# Patient Record
Sex: FEMALE | Race: WHITE | NOT HISPANIC OR LATINO | Employment: OTHER | URBAN - NONMETROPOLITAN AREA
[De-identification: names, ages, dates, MRNs, and addresses within clinical notes are randomized per-mention and may not be internally consistent; named-entity substitution may affect disease eponyms.]

---

## 2017-05-03 RX ORDER — ESTRADIOL AND NORETHINDRONE ACETATE .5; .1 MG/1; MG/1
TABLET ORAL
Qty: 84 TABLET | Refills: 1 | Status: SHIPPED | OUTPATIENT
Start: 2017-05-03 | End: 2017-10-27 | Stop reason: SDUPTHER

## 2017-10-16 DIAGNOSIS — Z12.31 SCREENING MAMMOGRAM, ENCOUNTER FOR: Primary | ICD-10-CM

## 2017-10-23 ENCOUNTER — TRANSCRIBE ORDERS (OUTPATIENT)
Dept: ADMINISTRATIVE | Facility: HOSPITAL | Age: 52
End: 2017-10-23

## 2017-10-23 DIAGNOSIS — R10.9 ABDOMINAL PAIN, UNSPECIFIED ABDOMINAL LOCATION: Primary | ICD-10-CM

## 2017-10-26 ENCOUNTER — APPOINTMENT (OUTPATIENT)
Dept: CT IMAGING | Facility: HOSPITAL | Age: 52
End: 2017-10-26

## 2017-10-26 ENCOUNTER — TRANSCRIBE ORDERS (OUTPATIENT)
Dept: ADMINISTRATIVE | Facility: HOSPITAL | Age: 52
End: 2017-10-26

## 2017-10-26 ENCOUNTER — HOSPITAL ENCOUNTER (EMERGENCY)
Facility: HOSPITAL | Age: 52
Discharge: HOME | End: 2017-10-26
Attending: EMERGENCY MEDICINE | Admitting: EMERGENCY MEDICINE

## 2017-10-26 VITALS
DIASTOLIC BLOOD PRESSURE: 72 MMHG | WEIGHT: 170 LBS | BODY MASS INDEX: 25.76 KG/M2 | SYSTOLIC BLOOD PRESSURE: 127 MMHG | TEMPERATURE: 97.1 F | HEART RATE: 72 BPM | OXYGEN SATURATION: 100 % | RESPIRATION RATE: 15 BRPM | HEIGHT: 68 IN

## 2017-10-26 DIAGNOSIS — R10.2 PELVIC PAIN: Primary | ICD-10-CM

## 2017-10-26 LAB
ALBUMIN SERPL-MCNC: 4.7 G/DL (ref 3.5–5)
ALBUMIN/GLOB SERPL: 1.5 G/DL (ref 1.1–2.5)
ALP SERPL-CCNC: 69 U/L (ref 24–120)
ALT SERPL W P-5'-P-CCNC: 26 U/L (ref 0–54)
AMYLASE SERPL-CCNC: 52 U/L (ref 30–110)
ANION GAP SERPL CALCULATED.3IONS-SCNC: 11 MMOL/L (ref 4–13)
AST SERPL-CCNC: 28 U/L (ref 7–45)
BASOPHILS # BLD AUTO: 0.02 10*3/MM3 (ref 0–0.2)
BASOPHILS NFR BLD AUTO: 0.2 % (ref 0–2)
BILIRUB SERPL-MCNC: 0.5 MG/DL (ref 0.1–1)
BILIRUB UR QL STRIP: NEGATIVE
BUN BLD-MCNC: 13 MG/DL (ref 5–21)
BUN/CREAT SERPL: 18.8 (ref 7–25)
CALCIUM SPEC-SCNC: 9.7 MG/DL (ref 8.4–10.4)
CHLORIDE SERPL-SCNC: 104 MMOL/L (ref 98–110)
CLARITY UR: CLEAR
CO2 SERPL-SCNC: 27 MMOL/L (ref 24–31)
COLOR UR: YELLOW
CREAT BLD-MCNC: 0.69 MG/DL (ref 0.5–1.4)
D-LACTATE SERPL-SCNC: 0.9 MMOL/L (ref 0.5–2)
DEPRECATED RDW RBC AUTO: 40.4 FL (ref 40–54)
EOSINOPHIL # BLD AUTO: 0.01 10*3/MM3 (ref 0–0.7)
EOSINOPHIL NFR BLD AUTO: 0.1 % (ref 0–4)
ERYTHROCYTE [DISTWIDTH] IN BLOOD BY AUTOMATED COUNT: 11.9 % (ref 12–15)
GFR SERPL CREATININE-BSD FRML MDRD: 90 ML/MIN/1.73
GLOBULIN UR ELPH-MCNC: 3.2 GM/DL
GLUCOSE BLD-MCNC: 98 MG/DL (ref 70–100)
GLUCOSE UR STRIP-MCNC: NEGATIVE MG/DL
HCT VFR BLD AUTO: 41.4 % (ref 37–47)
HGB BLD-MCNC: 14.1 G/DL (ref 12–16)
HGB UR QL STRIP.AUTO: NEGATIVE
IMM GRANULOCYTES # BLD: 0.03 10*3/MM3 (ref 0–0.03)
IMM GRANULOCYTES NFR BLD: 0.3 % (ref 0–5)
KETONES UR QL STRIP: NEGATIVE
LEUKOCYTE ESTERASE UR QL STRIP.AUTO: NEGATIVE
LIPASE SERPL-CCNC: 65 U/L (ref 23–203)
LYMPHOCYTES # BLD AUTO: 1.24 10*3/MM3 (ref 0.72–4.86)
LYMPHOCYTES NFR BLD AUTO: 12.4 % (ref 15–45)
MCH RBC QN AUTO: 31.9 PG (ref 28–32)
MCHC RBC AUTO-ENTMCNC: 34.1 G/DL (ref 33–36)
MCV RBC AUTO: 93.7 FL (ref 82–98)
MONOCYTES # BLD AUTO: 0.46 10*3/MM3 (ref 0.19–1.3)
MONOCYTES NFR BLD AUTO: 4.6 % (ref 4–12)
NEUTROPHILS # BLD AUTO: 8.28 10*3/MM3 (ref 1.87–8.4)
NEUTROPHILS NFR BLD AUTO: 82.4 % (ref 39–78)
NITRITE UR QL STRIP: NEGATIVE
PH UR STRIP.AUTO: 7.5 [PH] (ref 5–8)
PLATELET # BLD AUTO: 256 10*3/MM3 (ref 130–400)
PMV BLD AUTO: 10.8 FL (ref 6–12)
POTASSIUM BLD-SCNC: 4.4 MMOL/L (ref 3.5–5.3)
PROT SERPL-MCNC: 7.9 G/DL (ref 6.3–8.7)
PROT UR QL STRIP: NEGATIVE
RBC # BLD AUTO: 4.42 10*6/MM3 (ref 4.2–5.4)
SODIUM BLD-SCNC: 142 MMOL/L (ref 135–145)
SP GR UR STRIP: >=1.03 (ref 1–1.03)
UROBILINOGEN UR QL STRIP: NORMAL
WBC NRBC COR # BLD: 10.04 10*3/MM3 (ref 4.8–10.8)

## 2017-10-26 PROCEDURE — 99283 EMERGENCY DEPT VISIT LOW MDM: CPT

## 2017-10-26 PROCEDURE — 81003 URINALYSIS AUTO W/O SCOPE: CPT | Performed by: EMERGENCY MEDICINE

## 2017-10-26 PROCEDURE — 83605 ASSAY OF LACTIC ACID: CPT | Performed by: EMERGENCY MEDICINE

## 2017-10-26 PROCEDURE — 85025 COMPLETE CBC W/AUTO DIFF WBC: CPT | Performed by: EMERGENCY MEDICINE

## 2017-10-26 PROCEDURE — 0 IOPAMIDOL PER 1 ML: Performed by: EMERGENCY MEDICINE

## 2017-10-26 PROCEDURE — 82150 ASSAY OF AMYLASE: CPT | Performed by: EMERGENCY MEDICINE

## 2017-10-26 PROCEDURE — 83690 ASSAY OF LIPASE: CPT | Performed by: EMERGENCY MEDICINE

## 2017-10-26 PROCEDURE — 96361 HYDRATE IV INFUSION ADD-ON: CPT

## 2017-10-26 PROCEDURE — 96360 HYDRATION IV INFUSION INIT: CPT

## 2017-10-26 PROCEDURE — 74177 CT ABD & PELVIS W/CONTRAST: CPT

## 2017-10-26 PROCEDURE — 80053 COMPREHEN METABOLIC PANEL: CPT | Performed by: EMERGENCY MEDICINE

## 2017-10-26 RX ORDER — SODIUM CHLORIDE 9 MG/ML
125 INJECTION, SOLUTION INTRAVENOUS CONTINUOUS
Status: DISCONTINUED | OUTPATIENT
Start: 2017-10-26 | End: 2017-10-26 | Stop reason: HOSPADM

## 2017-10-26 RX ORDER — HYDROCODONE BITARTRATE AND ACETAMINOPHEN 7.5; 325 MG/1; MG/1
1 TABLET ORAL EVERY 4 HOURS PRN
Qty: 20 TABLET | Refills: 0 | Status: SHIPPED | OUTPATIENT
Start: 2017-10-26 | End: 2017-12-14

## 2017-10-26 RX ORDER — SODIUM CHLORIDE 0.9 % (FLUSH) 0.9 %
10 SYRINGE (ML) INJECTION AS NEEDED
Status: DISCONTINUED | OUTPATIENT
Start: 2017-10-26 | End: 2017-10-26 | Stop reason: HOSPADM

## 2017-10-26 RX ADMIN — IOPAMIDOL 100 ML: 755 INJECTION, SOLUTION INTRAVENOUS at 12:15

## 2017-10-26 RX ADMIN — SODIUM CHLORIDE 125 ML/HR: 9 INJECTION, SOLUTION INTRAVENOUS at 11:42

## 2017-10-26 NOTE — ED PROVIDER NOTES
Subjective   HPI Comments: Patient complains of abdominal pain this been going on for 3-3-1/2 weeks.  It is mostly lower in her abdomen.  She says the caliber of her stool has changed and is much smaller now when she tries to have a bowel movement.  It is also little less formed.  She has seen her family physician and had an ultrasound on the upper abdomen which showed a polyp in her gallbladder but everything else was okay.  She had a previous appendectomy.  She went through menopause many years ago and does not have menstrual cycles now.  She does not have any dysuria or frequency of urination.  The pain is increased where she does not she can tolerate it anymore.  Her family physician apparently was trying to arrange a CT scan but that would be some time at least a week away and she felt like the pain was bad enough she could not wait now.  She has had some anorexia but no actual vomiting.  Should not had any blood in her stool.    Patient is a 52 y.o. female presenting with abdominal pain.   History provided by:  Patient and spouse   used: No    Abdominal Pain   Pain location:  LLQ and RLQ  Pain quality: aching    Pain radiates to:  Does not radiate  Pain severity:  Moderate  Onset quality:  Gradual  Timing:  Constant  Progression:  Worsening  Chronicity:  New  Context: not alcohol use, not awakening from sleep, not diet changes, not eating, not laxative use, not medication withdrawal, not previous surgeries, not recent illness, not recent sexual activity, not recent travel, not retching, not sick contacts, not suspicious food intake and not trauma    Relieved by:  Nothing  Worsened by:  Nothing  Ineffective treatments:  None tried  Associated symptoms: anorexia    Associated symptoms: no belching, no chest pain, no chills, no constipation, no cough, no diarrhea, no dysuria, no fatigue, no fever, no flatus, no hematemesis, no hematochezia, no hematuria, no melena, no nausea, no shortness of  breath, no sore throat, no vaginal bleeding, no vaginal discharge and no vomiting    Risk factors: no alcohol abuse, no aspirin use, not elderly, has not had multiple surgeries, no NSAID use, not obese, not pregnant and no recent hospitalization        Review of Systems   Constitutional: Negative.  Negative for chills, fatigue and fever.   HENT: Negative.  Negative for sore throat.    Respiratory: Negative.  Negative for cough and shortness of breath.    Cardiovascular: Negative.  Negative for chest pain.   Gastrointestinal: Positive for abdominal pain and anorexia. Negative for constipation, diarrhea, flatus, hematemesis, hematochezia, melena, nausea and vomiting.   Genitourinary: Negative.  Negative for dysuria, hematuria, vaginal bleeding and vaginal discharge.   Musculoskeletal: Negative.    Neurological: Negative.    Hematological: Negative.    Psychiatric/Behavioral: Negative.    All other systems reviewed and are negative.      No past medical history on file.    No Known Allergies    No past surgical history on file.    No family history on file.    Social History     Social History   • Marital status:      Spouse name: N/A   • Number of children: N/A   • Years of education: N/A     Social History Main Topics   • Smoking status: Not on file   • Smokeless tobacco: Not on file   • Alcohol use Not on file   • Drug use: Not on file   • Sexual activity: Not on file     Other Topics Concern   • Not on file     Social History Narrative       Prior to Admission medications    Medication Sig Start Date End Date Taking? Authorizing Provider   Estradiol-Norethindrone Acet 0.5-0.1 MG per tablet TAKE 1 TABLET DAILY 5/3/17   [facility]   PREMARIN 0.625 MG/GM vaginal cream USE 1/2 GRAM 2 TIMES A WEEK 7/5/16   Historical Provider, MD       Medications   sodium chloride 0.9 % flush 10 mL (not administered)   sodium chloride 0.9 % infusion (0 mL/hr Intravenous Stopped 10/26/17 9150)   iopamidol  (ISOVUE-370) 76 % injection 100 mL (100 mL Intravenous Given 10/26/17 1215)       Vitals:    10/26/17 1459   BP: 127/72   Pulse: 72   Resp: 15   Temp: 97.1 °F (36.2 °C)   SpO2: 100%         Objective   Physical Exam   Constitutional: She is oriented to person, place, and time. She appears well-developed and well-nourished.   HENT:   Head: Normocephalic and atraumatic.   Eyes: EOM are normal. Pupils are equal, round, and reactive to light.   Neck: Normal range of motion. Neck supple.   Cardiovascular: Normal rate and regular rhythm.    Pulmonary/Chest: Effort normal and breath sounds normal.   Abdominal: Soft. Bowel sounds are normal. There is tenderness.   Patient is tender across the lower abdomen but no rebound or percussion tenderness is noted.  The pain may be a little more so in the midline.   Musculoskeletal: Normal range of motion.   Neurological: She is alert and oriented to person, place, and time.   Skin: Skin is warm and dry.   Psychiatric: She has a normal mood and affect. Her behavior is normal.   Nursing note and vitals reviewed.      Procedures         Lab Results (last 24 hours)     Procedure Component Value Units Date/Time    CBC & Differential [[id number]] Collected:  10/26/17 1144    Specimen:  Blood Updated:  10/26/17 1158    Narrative:       The following orders were created for panel order CBC & Differential.  Procedure                               Abnormality         Status                     ---------                               -----------         ------                     CBC Auto Differential[[id number]]         Abnormal            Final result                 Please view results for these tests on the individual orders.    Comprehensive Metabolic Panel [[id number]] Collected:  10/26/17 1144    Specimen:  Blood Updated:  10/26/17 1208     Glucose 98 mg/dL      BUN 13 mg/dL      Creatinine 0.69 mg/dL      Sodium 142 mmol/L      Potassium 4.4 mmol/L      Chloride 104 mmol/L      CO2 27.0  mmol/L      Calcium 9.7 mg/dL      Total Protein 7.9 g/dL      Albumin 4.70 g/dL      ALT (SGPT) 26 U/L      AST (SGOT) 28 U/L      Alkaline Phosphatase 69 U/L      Total Bilirubin 0.5 mg/dL      eGFR Non African Amer 90 mL/min/1.73      Globulin 3.2 gm/dL      A/G Ratio 1.5 g/dL      BUN/Creatinine Ratio 18.8     Anion Gap 11.0 mmol/L     Lipase [[id number]]  (Normal) Collected:  10/26/17 1144    Specimen:  Blood Updated:  10/26/17 1208     Lipase 65 U/L     Amylase [[id number]]  (Normal) Collected:  10/26/17 1144    Specimen:  Blood Updated:  10/26/17 1208     Amylase 52 U/L     Lactic Acid, Plasma [[id number]]  (Normal) Collected:  10/26/17 1144    Specimen:  Blood Updated:  10/26/17 1208     Lactate 0.9 mmol/L     CBC Auto Differential [[id number]]  (Abnormal) Collected:  10/26/17 1144    Specimen:  Blood Updated:  10/26/17 1158     WBC 10.04 10*3/mm3      RBC 4.42 10*6/mm3      Hemoglobin 14.1 g/dL      Hematocrit 41.4 %      MCV 93.7 fL      MCH 31.9 pg      MCHC 34.1 g/dL      RDW 11.9 (L) %      RDW-SD 40.4 fl      MPV 10.8 fL      Platelets 256 10*3/mm3      Neutrophil % 82.4 (H) %      Lymphocyte % 12.4 (L) %      Monocyte % 4.6 %      Eosinophil % 0.1 %      Basophil % 0.2 %      Immature Grans % 0.3 %      Neutrophils, Absolute 8.28 10*3/mm3      Lymphocytes, Absolute 1.24 10*3/mm3      Monocytes, Absolute 0.46 10*3/mm3      Eosinophils, Absolute 0.01 10*3/mm3      Basophils, Absolute 0.02 10*3/mm3      Immature Grans, Absolute 0.03 10*3/mm3     Urinalysis With / Culture If Indicated - Urine, Clean Catch [[id number]]  (Normal) Collected:  10/26/17 1240    Specimen:  Urine from Urine, Clean Catch Updated:  10/26/17 1318     Color, UA Yellow     Appearance, UA Clear     pH, UA 7.5     Specific Gravity, UA >=1.030     Glucose, UA Negative     Ketones, UA Negative     Bilirubin, UA Negative     Blood, UA Negative     Protein, UA Negative     Leuk Esterase, UA Negative     Nitrite, UA Negative     Urobilinogen, UA 0.2  E.U./dL    Narrative:       Urine microscopic not indicated.          CT Abdomen Pelvis With Contrast   Final Result   1. Cholelithiasis.           2. Horseshoe kidney   3. Diverticulosis..        4 uterine fibroid           This report was finalized on 10/26/2017 13:11 by Dr. Ferdinand Bolanos MD.          ED Course  ED Course   Comment By Time   I told the patient that her CT scan did reveal uterine fibroids is the only reasonable explanation for her pain since it is midline in her pelvis.  Though I cannot be sure that this is the cause of the pain.  Diverticulosis should not hurt and cholelithiasis hurting right upper quadrant.  Right now been treated for that and she says she always an appointment with Dr. August her OB/GYN next week and I told her to keep that. Dante Lee Jr., MD 10/26 1544          MDM  Number of Diagnoses or Management Options  Pelvic pain: new and requires workup     Amount and/or Complexity of Data Reviewed  Clinical lab tests: ordered and reviewed  Tests in the radiology section of CPT®: ordered and reviewed    Risk of Complications, Morbidity, and/or Mortality  Presenting problems: moderate  Diagnostic procedures: moderate  Management options: moderate    Patient Progress  Patient progress: stable      Final diagnoses:   Pelvic pain          Dante Lee Jr., MD  10/26/17 154

## 2017-10-27 ENCOUNTER — OFFICE VISIT (OUTPATIENT)
Dept: OBSTETRICS AND GYNECOLOGY | Facility: CLINIC | Age: 52
End: 2017-10-27

## 2017-10-27 VITALS
WEIGHT: 172 LBS | SYSTOLIC BLOOD PRESSURE: 130 MMHG | HEIGHT: 68 IN | BODY MASS INDEX: 26.07 KG/M2 | DIASTOLIC BLOOD PRESSURE: 72 MMHG

## 2017-10-27 DIAGNOSIS — K57.92 DIVERTICULITIS OF INTESTINE WITHOUT PERFORATION OR ABSCESS WITHOUT BLEEDING, UNSPECIFIED PART OF INTESTINAL TRACT: ICD-10-CM

## 2017-10-27 DIAGNOSIS — Z00.00 ANNUAL PHYSICAL EXAM: Primary | ICD-10-CM

## 2017-10-27 DIAGNOSIS — Z78.0 MENOPAUSE: ICD-10-CM

## 2017-10-27 DIAGNOSIS — Z78.9 NONSMOKER: ICD-10-CM

## 2017-10-27 PROCEDURE — 87624 HPV HI-RISK TYP POOLED RSLT: CPT | Performed by: OBSTETRICS & GYNECOLOGY

## 2017-10-27 PROCEDURE — 99396 PREV VISIT EST AGE 40-64: CPT | Performed by: OBSTETRICS & GYNECOLOGY

## 2017-10-27 PROCEDURE — 99213 OFFICE O/P EST LOW 20 MIN: CPT | Performed by: OBSTETRICS & GYNECOLOGY

## 2017-10-27 PROCEDURE — G0123 SCREEN CERV/VAG THIN LAYER: HCPCS | Performed by: OBSTETRICS & GYNECOLOGY

## 2017-10-27 RX ORDER — DICLOFENAC SODIUM 75 MG/1
TABLET, DELAYED RELEASE ORAL
Refills: 0 | COMMUNITY
Start: 2017-08-07 | End: 2017-10-27

## 2017-10-27 RX ORDER — ESTRADIOL AND NORETHINDRONE ACETATE .5; .1 MG/1; MG/1
1 TABLET ORAL DAILY
Qty: 90 TABLET | Refills: 3 | Status: SHIPPED | OUTPATIENT
Start: 2017-10-27 | End: 2018-11-09 | Stop reason: SDUPTHER

## 2017-10-27 RX ORDER — METRONIDAZOLE 500 MG/1
500 TABLET ORAL 3 TIMES DAILY
Qty: 30 TABLET | Refills: 0 | Status: SHIPPED | OUTPATIENT
Start: 2017-10-27 | End: 2017-11-06

## 2017-10-27 RX ORDER — METRONIDAZOLE 500 MG/1
500 TABLET ORAL 3 TIMES DAILY
Qty: 30 TABLET | Refills: 0 | Status: SHIPPED | OUTPATIENT
Start: 2017-10-27 | End: 2017-10-27 | Stop reason: SDUPTHER

## 2017-10-27 RX ORDER — CIPROFLOXACIN 250 MG/1
250 TABLET, FILM COATED ORAL 2 TIMES DAILY
Qty: 20 TABLET | Refills: 0 | Status: SHIPPED | OUTPATIENT
Start: 2017-10-27 | End: 2017-11-06

## 2017-10-27 RX ORDER — CIPROFLOXACIN 250 MG/1
250 TABLET, FILM COATED ORAL 2 TIMES DAILY
Qty: 20 TABLET | Refills: 0 | Status: SHIPPED | OUTPATIENT
Start: 2017-10-27 | End: 2017-10-27 | Stop reason: SDUPTHER

## 2017-10-27 NOTE — PROGRESS NOTES
Subjective   Chief Complaint   Patient presents with   • Gynecologic Exam     pt here today for annual exam. pt says that she has been having some lower pelvic pain. pt says that she is very sensitive to touch abdominally. pt voices no other concerns.      Patricia Palma is a 52 y.o. year old  menopausal female presenting to be seen for her annual exam.  The patient denies any vaginal bleeding in the past 12 months. Hot flashes and night sweats are not a significant problem.  Patient reports abdominal pain for exactly last four weeks, that she recalls started abruptly on a Saturday night.  She has been taking tylenol, and that takes the edge off, but she reports pain to be consistent every day.  Pain is often worse in the morning, but can also be bad at night.  Some night pain keeps her from sleeping.  No constipation or diarrhea.  + loss of appetite, but no nausea or vomiting.  She does report feeling bloated after meals.    SEXUAL Hx:  She is sexually active.  In the past year there has not been new sexual partners.      HEALTH Hx:  She exercises regularly: no. Currently she cannot tolerate any jarring or exercise because it exacerbates abd pain.    The patient reports regular self breast exams: yes  She has noticed changes in height: no.    No Additional Complaints Reported    The following portions of the patient's history were reviewed and updated as appropriate:problem list, current medications, allergies, past family history, past medical history, past social history and past surgical history.    Smoking status: Not on file                        Smokeless status: Not on file                       Review of Systems   Constitutional: Positive for activity change and appetite change.   Eyes: Positive for visual disturbance (wears glasses).   Respiratory: Negative for shortness of breath.    Cardiovascular: Negative for chest pain.   Gastrointestinal: Positive for abdominal distention (sometimes) and  "abdominal pain (across entire lower abdomen). Negative for constipation, diarrhea, nausea and vomiting.   Genitourinary: Positive for difficulty urinating (but reports that it has always been that way, has a very \"shy\" bladder) and dyspareunia. Negative for dysuria, frequency, urgency, vaginal bleeding and vaginal discharge.   Musculoskeletal: Negative for arthralgias and back pain.   Skin: Negative for rash.   Neurological: Negative for dizziness and headaches.         Objective   /72  Ht 68\" (172.7 cm)  Wt 172 lb (78 kg)  BMI 26.15 kg/m2  Physical Exam   Constitutional: She is oriented to person, place, and time. She appears well-developed and well-nourished. No distress.   HENT:   Head: Normocephalic and atraumatic.   Eyes: EOM are normal.   Neck: Normal range of motion. Neck supple. No thyromegaly present.   Cardiovascular: Normal rate and regular rhythm.    No murmur heard.  Pulmonary/Chest: Effort normal and breath sounds normal. Right breast exhibits no inverted nipple and no mass. Left breast exhibits no inverted nipple and no mass.   Abdominal: Soft. She exhibits no distension. There is tenderness (generalized across entire lower abdomen). There is no guarding (mild).   Genitourinary: Vagina normal and uterus normal. No breast tenderness or discharge. Pelvic exam was performed with patient supine. There is no tenderness or lesion on the right labia. There is no tenderness or lesion on the left labia. Cervix exhibits no motion tenderness, no discharge and no friability. Right adnexum displays no tenderness and no fullness. Left adnexum displays no tenderness and no fullness. No bleeding in the vagina. No vaginal discharge found.   Genitourinary Comments: Pt again with cervical stenosis that will not allow introduction of PAP brush into the endocervical canal (unchanged from last year).  Cx mildly tender to palpation, but less impressive than the generalized abdominal tenderness   Musculoskeletal: " Normal range of motion. She exhibits no edema.   Neurological: She is alert and oriented to person, place, and time.   Skin: Skin is warm and dry.   Psychiatric: She has a normal mood and affect. Her behavior is normal. Judgment normal.   Nursing note and vitals reviewed.       Assessment & Plan    Patricia was seen today for gynecologic exam.    Diagnoses and all orders for this visit:    Annual physical exam: Exam remarkable for moderate abdominal tenderness across the entire lower abdomen.  Pelvic exam unremarkable, except for cervical stenosis noted on previous exams.  The patient is spectral to have diverticulitis as the source of her lower abdominal pain for the past month, and is being treated appropriately.  A Pap smear was collected, the patient declined addition of STD testing.  Screening labs are being done by the patient's primary care physician.  Her mammogram is rescheduled for next week.  RTO in 4 weeks to follow-up on abd pain.  -     Liquid-based Pap Smear, Screening - ThinPrep Vial, Cervix    Diverticulitis of intestine without perforation or abscess without bleeding, unspecified part of intestinal tract  -     ciprofloxacin (CIPRO) 250 MG tablet; Take 1 tablet by mouth 2 (Two) Times a Day for 10 days.  -     metroNIDAZOLE (FLAGYL) 500 MG tablet; Take 1 tablet by mouth 3 (Three) Times a Day for 10 days.    Nonsmoker    BMI 26.0-26.9,adult    Menopause  -     Estradiol-Norethindrone Acet 0.5-0.1 MG per tablet; Take 1 tablet by mouth Daily.      This note was electronically signed.    Kaley August MD  10/27/2017  1:38 PM

## 2017-11-02 ENCOUNTER — APPOINTMENT (OUTPATIENT)
Dept: MAMMOGRAPHY | Facility: HOSPITAL | Age: 52
End: 2017-11-02
Attending: OBSTETRICS & GYNECOLOGY

## 2017-11-02 ENCOUNTER — APPOINTMENT (OUTPATIENT)
Dept: CT IMAGING | Facility: HOSPITAL | Age: 52
End: 2017-11-02

## 2017-11-03 LAB
GEN CATEG CVX/VAG CYTO-IMP: NORMAL
LAB AP CASE REPORT: NORMAL
LAB AP GYN ADDITIONAL INFORMATION: NORMAL
LAB AP GYN OTHER FINDINGS: NORMAL
Lab: NORMAL
PATH INTERP SPEC-IMP: NORMAL
STAT OF ADQ CVX/VAG CYTO-IMP: NORMAL

## 2017-11-28 ENCOUNTER — OFFICE VISIT (OUTPATIENT)
Dept: OBSTETRICS AND GYNECOLOGY | Facility: CLINIC | Age: 52
End: 2017-11-28

## 2017-11-28 ENCOUNTER — PROCEDURE VISIT (OUTPATIENT)
Dept: OBSTETRICS AND GYNECOLOGY | Facility: CLINIC | Age: 52
End: 2017-11-28

## 2017-11-28 VITALS
DIASTOLIC BLOOD PRESSURE: 82 MMHG | SYSTOLIC BLOOD PRESSURE: 114 MMHG | HEIGHT: 68 IN | BODY MASS INDEX: 25.61 KG/M2 | WEIGHT: 169 LBS

## 2017-11-28 DIAGNOSIS — R10.2 PELVIC PAIN: Primary | ICD-10-CM

## 2017-11-28 DIAGNOSIS — D25.9 UTERINE LEIOMYOMA, UNSPECIFIED LOCATION: ICD-10-CM

## 2017-11-28 DIAGNOSIS — R10.30 LOWER ABDOMINAL PAIN: ICD-10-CM

## 2017-11-28 DIAGNOSIS — K57.92 DIVERTICULITIS OF INTESTINE WITHOUT PERFORATION OR ABSCESS WITHOUT BLEEDING, UNSPECIFIED PART OF INTESTINAL TRACT: Primary | ICD-10-CM

## 2017-11-28 DIAGNOSIS — Z78.9 NONSMOKER: ICD-10-CM

## 2017-11-28 DIAGNOSIS — Z78.0 MENOPAUSE: ICD-10-CM

## 2017-11-28 PROCEDURE — 99213 OFFICE O/P EST LOW 20 MIN: CPT | Performed by: OBSTETRICS & GYNECOLOGY

## 2017-11-28 PROCEDURE — 76830 TRANSVAGINAL US NON-OB: CPT | Performed by: OBSTETRICS & GYNECOLOGY

## 2017-11-28 RX ORDER — DICYCLOMINE HYDROCHLORIDE 10 MG/1
10 CAPSULE ORAL AS NEEDED
COMMUNITY
Start: 2017-10-16 | End: 2019-07-03 | Stop reason: HOSPADM

## 2017-11-28 RX ORDER — DICLOFENAC SODIUM 75 MG/1
TABLET, DELAYED RELEASE ORAL
COMMUNITY
Start: 2017-11-22 | End: 2017-12-14

## 2017-11-28 NOTE — PROGRESS NOTES
Dorys Palma  : 1965  MRN: [id number]  [facility]: [id number]    History and Physical    Subjective   Patricia Palma is a 52 y.o. year old  who presents for consultation about abd pain.  The patient was seen about a month ago and treated with abx for suspected diverticulitis.  She reports that for the first couple of weeks she was really getting better and convinced that the diagnosis of diverticulitis had been correct.  Pain initially started about 9 weeks ago, and began acutely - with an associated change in the caliber of her stools (smaller).    Pain is low, all the way across her lower abdomen, and is sometimes in her inguinal fold - almost shooting toward her inner thigh.  She recalls that it took about a week for abx to really help and her bowel movements to become regular again.  After she finished the abx, pain seemed to remain better for a couple more weeks, but then returned 5-6 days ago.  She recalls that the pain returned shortly after taking several doses of diclofenac (for shoulder pain), but is unsure whether that is a coincidence or a causative event.    Pain comes in waves, and will actually cause patient to double over and stop all of her regular activities.  Exacerbations of pain seem to be overall worse in the morning, but can be at any time.  The pain is severe enough to wake her up from a deep sleep at times.  She does not feel there is any temporal association to eating.    History reviewed. No pertinent past medical history.  Past Surgical History:   Procedure Laterality Date   • APPENDECTOMY     •  SECTION      x2   • COLONOSCOPY       Smoking status: Never Smoker                                                              Smokeless status: Never Used                          Current Outpatient Prescriptions:   •  diclofenac (VOLTAREN) 75 MG EC tablet, , Disp: , Rfl:   •  dicyclomine (BENTYL) 10 MG capsule, , Disp: , Rfl:   •  Estradiol-Norethindrone Acet  "0.5-0.1 MG per tablet, Take 1 tablet by mouth Daily., Disp: 90 tablet, Rfl: 3  •  HYDROcodone-acetaminophen (NORCO) 7.5-325 MG per tablet, Take 1 tablet by mouth Every 4 (Four) Hours As Needed for Moderate Pain ., Disp: 20 tablet, Rfl: 0    No Known Allergies    Family History   Problem Relation Age of Onset   • No Known Problems Mother    • No Known Problems Sister    • No Known Problems Brother      Review of Systems   Respiratory: Negative for shortness of breath.    Cardiovascular: Negative for chest pain.   Gastrointestinal: Positive for abdominal pain. Negative for blood in stool, constipation and diarrhea.        Patient did note narrower caliber of stool prior to onset of pain, but thinks that her stool normalized some after antibiotics   Genitourinary: Negative for difficulty urinating and dysuria.         Objective   /82 (BP Location: Left arm, Patient Position: Sitting)  Ht 68\" (172.7 cm)  Wt 169 lb (76.7 kg)  BMI 25.7 kg/m2    Physical Exam   Physical Exam   Constitutional: She is oriented to person, place, and time. She appears well-developed and well-nourished. No distress.   HENT:   Head: Normocephalic and atraumatic.   Eyes: EOM are normal.   Neck: Normal range of motion. No thyromegaly present.   Cardiovascular: Normal rate and regular rhythm.    No murmur heard.  Pulmonary/Chest: Effort normal and breath sounds normal. She has no wheezes.   Abdominal: Soft. She exhibits no distension. There is tenderness (generalized tenderness in lower abdomen).   Genitourinary:   Genitourinary Comments: U/S today: uterus 7.84 x 4.71 x 3.8, endo lining 4.39.  Two small fibroids, largest 21 mm.  Ovaries unremarkable.   Musculoskeletal: Normal range of motion.   Neurological: She is alert and oriented to person, place, and time.   Skin: Skin is warm and dry.   Psychiatric: She has a normal mood and affect. Her behavior is normal. Judgment normal.   Nursing note and vitals reviewed.      Labs  Lab Results "   Component Value Date     10/26/2017    HGB 14.1 10/26/2017    HCT 41.4 10/26/2017    WBC 10.04 10/26/2017     10/26/2017    K 4.4 10/26/2017     10/26/2017    CO2 27.0 10/26/2017    BUN 13 10/26/2017    CREATININE 0.69 10/26/2017    GLUCOSE 98 10/26/2017    ALBUMIN 4.70 10/26/2017    CALCIUM 9.7 10/26/2017    AST 28 10/26/2017    ALT 26 10/26/2017    BILITOT 0.5 10/26/2017        Assessment & Plan    Patricia was seen today for follow-up.    Diagnoses and all orders for this visit:    Diverticulitis of intestine without perforation or abscess without bleeding, unspecified part of intestinal tract:  Patient feels like pain was significantly improved for a couple of weeks after abx, but returned last week.  She feels exacerbation of the pain with any bowel peristalsis.  -     Ambulatory Referral to Gastroenterology    Nonsmoker    BMI 26.0-26.9,adult    Menopause    Uterine leiomyoma, unspecified location: largest of two fibroids is 21 mm.  Patient advised that these are not contributing to pain and are not growing at her age.  Remainder of pelvic u/s today is normal    Lower abdominal pain  -     Ambulatory Referral to Gastroenterology        Kaley August MD  11/28/2017  1:30 PM

## 2017-11-29 ENCOUNTER — TRANSCRIBE ORDERS (OUTPATIENT)
Dept: PHYSICAL THERAPY | Facility: HOSPITAL | Age: 52
End: 2017-11-29

## 2017-11-29 DIAGNOSIS — M25.512 LEFT SHOULDER PAIN, UNSPECIFIED CHRONICITY: Primary | ICD-10-CM

## 2017-12-11 ENCOUNTER — HOSPITAL ENCOUNTER (OUTPATIENT)
Dept: PHYSICAL THERAPY | Facility: HOSPITAL | Age: 52
Setting detail: THERAPIES SERIES
Discharge: HOME | End: 2017-12-11

## 2017-12-11 DIAGNOSIS — M25.512 LEFT SHOULDER PAIN, UNSPECIFIED CHRONICITY: Primary | ICD-10-CM

## 2017-12-11 PROCEDURE — 97110 THERAPEUTIC EXERCISES: CPT | Performed by: PHYSICAL THERAPIST

## 2017-12-11 PROCEDURE — 97161 PT EVAL LOW COMPLEX 20 MIN: CPT | Performed by: PHYSICAL THERAPIST

## 2017-12-12 NOTE — THERAPY EVALUATION
Outpatient Physical Therapy Ortho Initial Evaluation  [facility]     Patient Name: Patricia Palma  : 1965  MRN: [id number]  Today's Date: 2017      Visit Date: 2017     Subjective Improvement:  N/A     Attendance:   Approved:  Requires authorization         MD follow up:  TBD         RC date:  18         There is no problem list on file for this patient.       History reviewed. No pertinent past medical history.     Past Surgical History:   Procedure Laterality Date   • APPENDECTOMY     •  SECTION      x2   • COLONOSCOPY       No Known Allergies      Current Outpatient Prescriptions:   •  diclofenac (VOLTAREN) 75 MG EC tablet, , Disp: , Rfl:   •  dicyclomine (BENTYL) 10 MG capsule, , Disp: , Rfl:   •  Estradiol-Norethindrone Acet 0.5-0.1 MG per tablet, Take 1 tablet by mouth Daily., Disp: 90 tablet, Rfl: 3  •  HYDROcodone-acetaminophen (NORCO) 7.5-325 MG per tablet, Take 1 tablet by mouth Every 4 (Four) Hours As Needed for Moderate Pain ., Disp: 20 tablet, Rfl: 0      Visit Dx:     ICD-10-CM ICD-9-CM   1. Left shoulder pain, unspecified chronicity M25.512 719.41             Patient History       17 1400          History    Chief Complaint Pain  -KG      Type of Pain Shoulder pain  -KG      Date Current Problem(s) Began --   ~ 3 months ago  -KG      Brief Description of Current Complaint Pt presents with c/o of L shoulder pain that has worsened over the past month. Began ~3 months ago, doesn't remember a specific injury. Pain mostly located at anteriolateral shoulder. No numbness or tingling reported in shoulder but will wake up during the night with numbness in last 2 fingers but doesn't happen any other time.  -KG      Onset Date- PT 17  -KG      Patient/Caregiver Goals Relieve pain;Improve strength;Improve mobility;Return to prior level of function  -KG      Hand Dominance right-handed   Reaches/lifts with L arm vs. R arm  -KG       Occupation/sports/leisure activities Pt is self-employed; farm work/horses  -KG      Results of Clinical Tests No recent clinical tests  -KG      Pain     Pain Location Shoulder  -KG      Pain at Present 2  -KG      Pain at Best 2  -KG      Pain at Worst 8  -KG      Pain Frequency Constant/continuous  -KG      Pain Description Aching;Sharp  -KG      What Performance Factors Make the Current Problem(s) WORSE? Reaching out to the side, overhead activities; working/grooming horses, reaching behind back  -KG      What Performance Factors Make the Current Problem(s) BETTER? Ice  -KG      Tolerance Time- Lying Increased pain at night  -KG      Is your sleep disturbed? 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 Independent with increased pain  -KG        User Key  (r) = Recorded By, (t) = Taken By, (c) = Cosigned By    Initials Name Provider Type    KG Crista Rivas, PT Physical Therapist                PT Ortho       12/11/17 1400    Subjective Comments    Subjective Comments Refer to therapy pt history for details.  -KG    Precautions and Contraindications    Precautions/Limitations no known precautions/limitations  -KG    Subjective Pain    Able to rate subjective pain? yes  -KG    Pre-Treatment Pain Level 2  -KG    Posture/Observations    Posture/Observations Comments No signs of acute distress. Minimal LUE guarding noted. Rounded shoulders posture. L shoulder slightly forward compared to R.  -KG    Special Tests/Palpation    Special Tests/Palpation Shoulder  -KG    Shoulder Impingement/Rotator Cuff Special Tests    Nix-Eyad Test (RC Lesion vs. Bursitis) Unable to test   Lack of ROM/increased pain  -KG    Full Can Test (RC Lesion) Right:;Positive  -KG    Empty Can Test (RC Lesion) Right:;Positive  -KG    ROM (Range of Motion)    General ROM upper extremity range of motion deficits identified  -KG    Left  Shoulder    Flexion AROM Deficit 95 deg; pain lat shoulder  -KG    Flexion PROM Deficit 120 deg  -KG    ABduction AROM Deficit 65 deg; pain anterolateral shoulder & down to elbow  -KG    ABduction PROM Deficit 90 deg  -KG    External Rotation AROM Deficit 25 deg; pain posterlateral shoulder  -KG    External Rotation PROM Deficit 3 deg  -KG    Internal Rotation AROM Deficit Functional reach to L4  -KG    Internal Rotation PROM Deficit 50 deg  -KG    ROM Impairment Detail Pain at end ranges of all P/AROM.  -KG    General UE Assessment    ROM shoulder, left: UE ROM deficit  -KG    MMT (Manual Muscle Testing)    General MMT Assessment upper extremity strength deficits identified  -KG    General MMT Assessment Detail R shoulder MMT grossly 5/5 in all planes; R elbow flex/ext MMT 5/5  -KG    Left Shoulder    Flexion Gross Movement (4-/5) good minus  -KG    ABduction Gross Movement (3-/5) fair minus   Grossly; not formally tested due to lack of ROM  -KG    Int Rotation Gross Movement (3+/5) fair plus  -KG    Ext Rotation Gross Movement (4/5) good  -KG    Manual Muscle Testing Detail Increased pain resisted IR>ER, abd>flex  -KG    Upper Extremity    Upper Ext Manual Muscle Testing left shoulder strength deficit  -KG    Upper Ext Manual Muscle Testing Detail L elbow flex/ext 4-/5  -KG      User Key  (r) = Recorded By, (t) = Taken By, (c) = Cosigned By    Initials Name Provider Type    KG Crista Rivas PT Physical Therapist                            Therapy Education       12/11/17 1400          Therapy Education    Education Details POC education. Anatomy related to condition. Ice education. HEP: table slides flex/scap, scap squeezes  -KG      Given HEP;Symptoms/condition management;Pain management;Posture/body mechanics  -KG      Program New  -KG      How Provided Verbal;Demonstration;Written  -KG      Provided to Patient  -KG      Level of Understanding Teach back education performed;Verbalized;Demonstrated  -KG         User Key  (r) = Recorded By, (t) = Taken By, (c) = Cosigned By    Initials Name Provider Type    KG Crista Rivas, PT Physical Therapist                PT OP Goals       12/11/17 1400       PT Short Term Goals    STG Date to Achieve 12/25/17  -KG     STG 1 Independent/complaint with HEP  -KG     STG 1 Progress New  -KG     STG 2 Tolerate 45 min treatment session without increased pain  -KG     STG 2 Progress New  -KG     STG 3 Demonstrate L shoulder flex PROM to 140 deg or greater  -KG     STG 3 Progress New  -KG     STG 4 Demonstrate L shoulder abd PROM to 120 deg or greater  -KG     STG 4 Progress New  -KG     STG 5 Demonstrate L shoulder ER PROM to 20 deg  -KG     STG 5 Progress New  -KG     Long Term Goals    LTG Date to Achieve 01/08/18  -KG     LTG 1 Subjectively report 60% improvement or greater  -KG     LTG 1 Progress New  -KG     LTG 2 QuickDASH score to 30% or less  -KG     LTG 2 Progress New  -KG     LTG 3 Demonstrate L shoulder flex AROM to 140 deg or greater  -KG     LTG 3 Progress New  -KG     LTG 4 Demonstrate L shoulder abd AROM to 130 deg or greater  -KG     LTG 4 Progress New  -KG     LTG 5 Demonstrate L shoulder flex/abd MMT to 4/5  -KG     LTG 5 Progress New  -KG     LTG 6 Demonstrate L shoulder ER/IR to 4/5  -KG     LTG 6 Progress New  -KG     Time Calculation    PT Goal Re-Cert Due Date 01/01/18  -KG       User Key  (r) = Recorded By, (t) = Taken By, (c) = Cosigned By    Initials Name Provider Type    KG Crista Rivas, PT Physical Therapist                PT Assessment/Plan       12/11/17 1400       PT Assessment    Functional Limitations Limitation in home management;Limitations in community activities;Performance in leisure activities;Performance in self-care ADL;Performance in work activities  -KG     Impairments Impaired flexibility;Muscle strength;Pain;Poor body mechanics;Posture;Range of motion  -KG     Assessment Comments Pt presents with L shoulder pain. Signs & symptoms  consistent with RTC involvement. Pt demonstrates decreased P/AROM of L shoulder with increased pain at end ranges. Pt more limited in abd/ER at this time with decreased shoulder strength noted as well. Pt will benefit from skilled PT services to address these limitations for improvements in overall functional strength/mobility to faciliate return to PLOF.  -KG     Rehab Potential Good  -KG     Patient/caregiver participated in establishment of treatment plan and goals Yes  -KG     Patient would benefit from skilled therapy intervention Yes  -KG     PT Plan    PT Frequency 2x/week  -KG     Predicted Duration of Therapy Intervention (days/wks) 4 weeks  -KG     Planned CPT's? PT EVAL LOW COMPLEXITY: 56018;PT THER ACT EA 15 MIN: 75140;PT RE-EVAL: 47229;PT THER PROC EA 15 MIN: 03394;PT MANUAL THERAPY EA 15 MIN: 36277;PT NEUROMUSC RE-EDUCATION EA 15 MIN: 90886;PT SELF CARE/HOME MGMT/TRAIN EA 15: 10979;PT ELECTRICAL STIM UNATTEND: ;PT THER SUPP EA 15 MIN  -KG     Physical Therapy Interventions (Optional Details) home exercise program;joint mobilization;manual therapy techniques;modalities;neuromuscular re-education;patient/family education;postural re-education;ROM (Range of Motion);strengthening;stretching  -KG     PT Plan Comments HEP, shoulder P/AA --> AROM, gentle strengthening, scap stability/posture, manual therapy/GH joint mobilizations, IFC/ice prn for pain  -KG       User Key  (r) = Recorded By, (t) = Taken By, (c) = Cosigned By    Initials Name Provider Type    KG Crista Rivas, PT Physical Therapist                  Exercises       12/11/17 1400          Subjective Comments    Subjective Comments Refer to therapy pt history for details.  -KG      Subjective Pain    Able to rate subjective pain? yes  -KG      Pre-Treatment Pain Level 2  -KG      Post-Treatment Pain Level 3  -KG      Aquatics    Aquatics performed? No  -KG      Exercise 1    Exercise Name 1 Scap squeezes  -KG      Additional Comments  Review/demo for HEP  -KG      Exercise 2    Exercise Name 2 Table slides flex  -KG      Additional Comments Review/demo for HEP  -KG      Exercise 3    Exercise Name 3 Table slides scap  -KG      Additional Comments Review/demo for HEP  -KG        User Key  (r) = Recorded By, (t) = Taken By, (c) = Cosigned By    Initials Name Provider Type    ASHLYN Rivas, PT Physical Therapist           Manual Rx (last 36 hours)      Manual Treatments       12/11/17 1400          Manual Rx 1    Manual Rx 1 Location L Shoulder  -KG      Manual Rx 1 Type PROM flex, abd, ER/IR at 45 deg abd  -KG      Manual Rx 1 Grade Gentle  -KG      Manual Rx 1 Duration 5 min  -KG        User Key  (r) = Recorded By, (t) = Taken By, (c) = Cosigned By    Initials Name Provider Type    ASHLYN Rivas, PT Physical Therapist                            Outcome Measures       12/11/17 1400          Quick DASH    Open a tight or new jar. 3  -KG      Do heavy household chores (e.g., wash walls, wash floors) 3  -KG      Carry a shopping bag or briefcase 1  -KG      Wash your back 4  -KG      Use a knife to cut food 1  -KG      Recreational activities in which you take some force or impact through your arm, should or hand (e.g. golf, hammering, tennis, etc.) 4  -KG      During the past week, to what extent has your arm, shoulder, or hand problem interfered with your normal social activites with family, friends, neighbors or groups? 4  -KG      During the past week, were you limited in your work or other regular daily activities as a result of your arm, shoulder or hand problem? 3  -KG      Arm, Shoulder, or hand pain 4  -KG      Tingling (pins and needles) in your arm, shoulder, or hand 1  -KG      During the past week, how much difficulty have you had sleeping because of the pain in your arm, shoulder or hand? 4  -KG      Number of Questions Answered 11  -KG      Quick DASH Score 47.73  -KG      Functional Assessment    Outcome Measure Options  Quick DASH  -ASHLYN        User Key  (r) = Recorded By, (t) = Taken By, (c) = Cosigned By    Initials Name Provider Type    KG Crista Rivas, PT Physical Therapist            Time Calculation:   Start Time: 1437  Stop Time: 1520  Time Calculation (min): 43 min  Total Timed Code Minutes- PT: 0 minute(s)     Therapy Charges for Today     Code Description Service Date Service Provider Modifiers Qty    91726727903 HC PT EVAL LOW COMPLEXITY 3 12/11/2017 Crista Rivas, PT GP 1          PT G-Codes  Outcome Measure Options: Andrey Rivas, PT  12/11/2017

## 2017-12-14 ENCOUNTER — OFFICE VISIT (OUTPATIENT)
Dept: GASTROENTEROLOGY | Facility: CLINIC | Age: 52
End: 2017-12-14

## 2017-12-14 VITALS
DIASTOLIC BLOOD PRESSURE: 68 MMHG | OXYGEN SATURATION: 99 % | TEMPERATURE: 96.7 F | WEIGHT: 175 LBS | HEIGHT: 68 IN | HEART RATE: 70 BPM | BODY MASS INDEX: 26.52 KG/M2 | SYSTOLIC BLOOD PRESSURE: 112 MMHG

## 2017-12-14 DIAGNOSIS — K57.30 DIVERTICULOSIS OF LARGE INTESTINE WITHOUT HEMORRHAGE: ICD-10-CM

## 2017-12-14 DIAGNOSIS — R19.5 CHANGE IN STOOL CALIBER: ICD-10-CM

## 2017-12-14 DIAGNOSIS — R10.30 LOWER ABDOMINAL PAIN: Primary | ICD-10-CM

## 2017-12-14 PROBLEM — K57.90 DIVERTICULOSIS: Status: ACTIVE | Noted: 2017-12-14

## 2017-12-14 PROCEDURE — 99204 OFFICE O/P NEW MOD 45 MIN: CPT | Performed by: NURSE PRACTITIONER

## 2017-12-14 RX ORDER — SODIUM, POTASSIUM,MAG SULFATES 17.5-3.13G
2 SOLUTION, RECONSTITUTED, ORAL ORAL ONCE
Qty: 2 BOTTLE | Refills: 0 | Status: SHIPPED | OUTPATIENT
Start: 2017-12-14 | End: 2017-12-14

## 2017-12-14 NOTE — PROGRESS NOTES
"Chief Complaint:   Chief Complaint   Patient presents with   • Abdominal Pain     Patient is here today as a new patient with abdominal pain for the last 3 mo that has been dignosed with diverticulosis.         Patient ID: Patricia Palma is a 52 y.o. female     History of Present Illness:This is a very pleasant 52-year-old female who was referred to our office for complaints of lower abdominal pain.  The patient states that in September lower generalized abdominal pain. Began to worsen to \"doubling over\". She states that it was worse with movement. This went on into October and went to ER 10/26/17 CT scan revealed cholelithiasis, diverticulosis, and uterine fibroid and horseshoe kidney. She was then referred to her OBGYN  who felt that this was not associated with her abdominal complaints but treated her with Flagly and Cipro. Treated prophylactically for possible diverticulitis.  The patient states that she began to feel much better for about 4 weeks and then about 1 week after that she began to have pain again. She states the only other complaint she states that she began to see a small caliber in stool. She was also seen by PCP who treated her with Augmentin which made her feel much better.    At this time the patient denies any nausea, vomiting, epigastric pain, pyrosis, dysphagia or hematemesis.  She denies any fever or chills.  She denies any melena or hematochezia.  She denies any constipation or diarrhea.  She denies any unintentional weight loss or loss of appetite.    There is no family history of colon polyps or colon cancer. The patient states that she had a colonoscopy about 10 years ago per  but she cannot remember if any polyps were removed.      History reviewed. No pertinent past medical history.    Past Surgical History:   Procedure Laterality Date   • APPENDECTOMY     •  SECTION      x2   • COLONOSCOPY      10 years         Current Outpatient Prescriptions:   •  " "dicyclomine (BENTYL) 10 MG capsule, , Disp: , Rfl:   •  Estradiol-Norethindrone Acet 0.5-0.1 MG per tablet, Take 1 tablet by mouth Daily., Disp: 90 tablet, Rfl: 3  •  sodium-potassium-magnesium sulfates (SUPREP BOWEL PREP KIT) 17.5-3.13-1.6 GM/180ML solution oral solution, Take 2 bottles by mouth 1 (One) Time for 1 dose. Split dose prep as directed by office instructions provided.  2 bottles = one kit., Disp: 2 bottle, Rfl: 0    No Known Allergies    Social History     Social History   • Marital status:      Spouse name: N/A   • Number of children: N/A   • Years of education: N/A     Occupational History   • Not on file.     Social History Main Topics   • Smoking status: Never Smoker   • Smokeless tobacco: Never Used   • Alcohol use No   • Drug use: No   • Sexual activity: Not on file     Other Topics Concern   • Not on file     Social History Narrative       Family History   Problem Relation Age of Onset   • No Known Problems Mother    • No Known Problems Sister    • No Known Problems Brother    • Colon cancer Neg Hx    • Colon polyps Neg Hx        Vitals:    12/14/17 1322   BP: 112/68   Pulse: 70   Temp: 96.7 °F (35.9 °C)   SpO2: 99%   Weight: 79.4 kg (175 lb)   Height: 172.7 cm (68\")       Review of Systems:    General:    Present -feeling well   Skin:    Not Present-Rash   HEENT:     Not Present-Acute visual changes or Acute hearing changes   Neck :    Not Present- swollen glands   Genitourinary:      Not Present- burning, frequency, urgency hematuria, dysuria,   Cardiovascular:   Not Present-chest pain, palpitations, or pressure   Respiratory:   Not Present- shortness of breath or cough   Gastrointestinal:  Musculoskeletal:  Neurological:  Psychiatric:   Present as mentioned in the HP    Not Present. Recent gait disturbances.    Not Present-Seizures and weakness in extremities.    Not Present- Anxiety or Depression.       Physical Exam:    General Appearance:    Alert, cooperative, in no acute distress "   Psych:    Mood appropriate    Eyes:          conjunctivae and sclerae normal, no   icterus, no pallor   ENMT:    Ears appear intact with no abnormalities noted oral mucosa moist   Neck:   No adenopathy, supple, trachea midline, no thyromegaly, no   carotid bruit, no JVD    Cardiovascular:    Regular rhythm and normal rate, normal S1 and S2, no            murmur, no gallop, no rub, no click   Gastrointestinal:     Inspection normal.  Normal bowel sounds, no masses, no organomegaly, soft round non-tender, non-distended, no guarding, no rebound or tenderness. No hepatosplenomegaly.   Skin:   No bleeding, bruising or rash   Neurologic:   nonfocal       Lab Results   Component Value Date    WBC 10.04 10/26/2017    HGB 14.1 10/26/2017    HCT 41.4 10/26/2017     10/26/2017        Lab Results   Component Value Date     10/26/2017    K 4.4 10/26/2017     10/26/2017    CO2 27.0 10/26/2017    BUN 13 10/26/2017    CREATININE 0.69 10/26/2017    BILITOT 0.5 10/26/2017    ALKPHOS 69 10/26/2017    ALT 26 10/26/2017    AST 28 10/26/2017    GLUCOSE 98 10/26/2017       No results found for: INR    Assessment and Plan:    Patricia was seen today for abdominal pain.    Diagnoses and all orders for this visit:    Lower abdominal pain  -     Case Request; Standing  -     Case Request Colonoscopy to be scheduled in about 6 weeks    Change in stool caliber  -     Case Request; Standing  -     Case Request    Diverticulosis of large intestine without hemorrhage  -     Case Request; Standing  -     Case Request    Other orders  -     Implement Anesthesia Orders Day of Procedure; Standing  -     Obtain Informed Consent; Standing  -     Verify bowel prep was successful; Standing  -     sodium-potassium-magnesium sulfates (SUPREP BOWEL PREP KIT) 17.5-3.13-1.6 GM/180ML solution oral solution; Take 2 bottles by mouth 1 (One) Time for 1 dose. Split dose prep as directed by office instructions provided.  2 bottles = one  kit.          Next follow-up appointment      The risks, benefits, and alternatives of colonoscopy were reviewed with the patient today.  Risks including perforation of the colon possibly requiring surgery or colostomy.  Additional risks include risk of bleeding from biopsies or removal of colon tissue.  There is also the risk of a drug reaction or problems with anesthesia.  This will be discussed with the further by the anesthesia team on the day of the procedure.  Lastly there is a possibility of missing a colon polyp or cancer.  The benefits include the diagnosis and management of disease of the colon and rectum.  Alternatives to colonoscopy include barium enema, laboratory testing, radiographic evaluation, or no intervention.  The patient verbalizes understanding and agrees.      EMR Dragon/Transcription disclaimer:  Much of this encounter note is an electronic transcription/translation of spoken language to printed text. The electronic translation of spoken language may permit erroneous, or at times, nonsensical words or phrases to be inadvertently transcribed; although I have reviewed the note for such errors, some may still exist.

## 2017-12-21 ENCOUNTER — HOSPITAL ENCOUNTER (OUTPATIENT)
Dept: PHYSICAL THERAPY | Facility: HOSPITAL | Age: 52
Setting detail: THERAPIES SERIES
Discharge: HOME | End: 2017-12-21

## 2017-12-21 DIAGNOSIS — M25.512 LEFT SHOULDER PAIN, UNSPECIFIED CHRONICITY: Primary | ICD-10-CM

## 2017-12-21 PROCEDURE — 97110 THERAPEUTIC EXERCISES: CPT | Performed by: PHYSICAL THERAPIST

## 2017-12-21 PROCEDURE — G0283 ELEC STIM OTHER THAN WOUND: HCPCS | Performed by: PHYSICAL THERAPIST

## 2017-12-21 NOTE — THERAPY TREATMENT NOTE
Outpatient Physical Therapy Ortho Treatment Note  [facility]     Patient Name: Patricia Palma  : 1965  MRN: [id number]  Today's Date: 2017      Visit Date: 2017     Subjective Improvement: 0%      Attendance: 2  Approved:   6 visits + eval until 18        MD follow up:    TBD        date:    18       Visit Dx:    ICD-10-CM ICD-9-CM   1. Left shoulder pain, unspecified chronicity M25.512 719.41       Patient Active Problem List   Diagnosis   • Lower abdominal pain   • Change in stool caliber   • Diverticulosis   • Diverticulosis of large intestine without hemorrhage        No past medical history on file.     Past Surgical History:   Procedure Laterality Date   • APPENDECTOMY     •  SECTION      x2   • COLONOSCOPY      10 years             PT Ortho       17 1600    Precautions and Contraindications    Precautions/Limitations no known precautions/limitations  -KG    Subjective Pain    Able to rate subjective pain? yes  -KG    Posture/Observations    Posture/Observations Comments No acute distress. Slight LUE guarding. Rounded shoulders, L more forward.  -KG      User Key  (r) = Recorded By, (t) = Taken By, (c) = Cosigned By    Initials Name Provider Type    KG Crista Rivas, PT Physical Therapist                            PT Assessment/Plan       17 1600       PT Assessment    Functional Limitations Limitation in home management;Limitations in community activities;Performance in leisure activities;Performance in self-care ADL;Performance in work activities  -KG     Impairments Impaired flexibility;Muscle strength;Pain;Poor body mechanics;Posture;Range of motion  -KG     Assessment Comments Pt more painful this date & did not tolerate passive abd or ER very well. Omitted table slides scap from HEP as it is more painful. Tolerated all therex fairly well with only minor discomfort at times. Continues to demonstrate signs/symptoms consistent with  RTC involvement. Will monitor progress and refer back to MD for further evaluation if needed. Good response to modalities.  -KG     Rehab Potential Good  -KG     Patient/caregiver participated in establishment of treatment plan and goals Yes  -KG     Patient would benefit from skilled therapy intervention Yes  -KG     PT Plan    PT Frequency 2x/week  -KG     Predicted Duration of Therapy Intervention (days/wks) 4 weeks  -KG     PT Plan Comments Continue gentle PROM & scap stability. Progress AAROM as tolerated. Will monitor progress and refer back to MD if necessary.  -KG       User Key  (r) = Recorded By, (t) = Taken By, (c) = Cosigned By    Initials Name Provider Type    ASHLYN Rivas, PT Physical Therapist                Modalities       12/21/17 1600          Ice    Ice Applied Yes   with e-stim  -KG      Location L Shoulder  -KG      Rx Minutes 15 mins  -KG      Ice S/P Rx Yes  -KG      ELECTRICAL STIMULATION    Attended/Unattended Unattended  -KG      Stimulation Type IFC  -KG      Location/Electrode Placement/Other L Shoulder  -KG      Rx Minutes 15 mins  -KG        User Key  (r) = Recorded By, (t) = Taken By, (c) = Cosigned By    Initials Name Provider Type    ASHLYN Rivas, PT Physical Therapist                Exercises       12/21/17 1600          Subjective Comments    Subjective Comments Pt states her shoulder has been more painful over the past week. Could possibly be some of the exercises. States she still forgets that she can't reach behind back or across body quickly. States when she does that she has increased pain.  -KG      Subjective Pain    Able to rate subjective pain? yes  -KG      Pre-Treatment Pain Level 5  -KG      Post-Treatment Pain Level 4  -KG      Aquatics    Aquatics performed? No  -KG      Exercise 1    Exercise Name 1 Pulleys flex  -KG      Sets 1 2  -KG      Reps 1 10  -KG      Exercise 2    Exercise Name 2 Scap retraction  -KG      Sets 2 2  -KG      Reps 2 10  -KG    "   Time (Seconds) 2 5\" hold  -KG      Exercise 3    Exercise Name 3 Shoulder shrugs  -KG      Sets 3 2  -KG      Reps 3 10  -KG      Exercise 4    Exercise Name 4 Table slides flex  -KG      Sets 4 2  -KG      Reps 4 10  -KG      Exercise 5    Exercise Name 5 Supine AA wood chops to 90 deg  -KG      Sets 5 2  -KG      Reps 5 10  -KG      Exercise 6    Exercise Name 6 UT Stretch  -KG      Reps 6 2  -KG      Time (Seconds) 6 30  -KG      Exercise 7    Exercise Name 7 Levator stretch  -KG      Reps 7 2  -KG      Time (Seconds) 7 30  -KG        User Key  (r) = Recorded By, (t) = Taken By, (c) = Cosigned By    Initials Name Provider Type    ASHLYN Rivas, PT Physical Therapist                        Manual Rx (last 36 hours)      Manual Treatments       12/21/17 1600          Manual Rx 1    Manual Rx 1 Location L Shoulder  -KG      Manual Rx 1 Type PROM flex, abd, ER/IR at 45 deg abd  -KG      Manual Rx 1 Grade Gentle  -KG      Manual Rx 1 Duration 5 min  -KG        User Key  (r) = Recorded By, (t) = Taken By, (c) = Cosigned By    Initials Name Provider Type    ASHLYN Rivas, PT Physical Therapist                PT OP Goals       12/21/17 1600       PT Short Term Goals    STG Date to Achieve 12/25/17  -KG     STG 1 Independent/complaint with HEP  -KG     STG 1 Progress Progressing  -KG     STG 2 Tolerate 45 min treatment session without increased pain  -KG     STG 2 Progress Progressing  -KG     STG 3 Demonstrate L shoulder flex PROM to 140 deg or greater  -KG     STG 3 Progress Progressing  -KG     STG 4 Demonstrate L shoulder abd PROM to 120 deg or greater  -KG     STG 4 Progress Progressing  -KG     STG 5 Demonstrate L shoulder ER PROM to 20 deg  -KG     STG 5 Progress Progressing  -KG     Long Term Goals    LTG Date to Achieve 01/08/18  -KG     LTG 1 Subjectively report 60% improvement or greater  -KG     LTG 1 Progress Progressing  -KG     LTG 2 QuickDASH score to 30% or less  -KG     LTG 2 Progress " Progressing  -KG     LTG 3 Demonstrate L shoulder flex AROM to 140 deg or greater  -KG     LTG 3 Progress Progressing  -KG     LTG 4 Demonstrate L shoulder abd AROM to 130 deg or greater  -KG     LTG 4 Progress Progressing  -KG     LTG 5 Demonstrate L shoulder flex/abd MMT to 4/5  -KG     LTG 5 Progress Progressing  -KG     LTG 6 Demonstrate L shoulder ER/IR to 4/5  -KG     LTG 6 Progress Progressing  -KG     Time Calculation    PT Goal Re-Cert Due Date 01/01/18  -KG       User Key  (r) = Recorded By, (t) = Taken By, (c) = Cosigned By    Initials Name Provider Type    KG Crista Rivas, PT Physical Therapist          Therapy Education  Education Details: D/C'd table slides scaption from HEP. Added UT/levator stretch  Given: HEP, Symptoms/condition management, Pain management, Posture/body mechanics              Time Calculation:   Start Time: 1605  Stop Time: 1701  Time Calculation (min): 56 min  Total Timed Code Minutes- PT: 41 minute(s)    Therapy Charges for Today     Code Description Service Date Service Provider Modifiers Qty    94658676732 HC PT THER PROC EA 15 MIN 12/21/2017 Crista Rivas, PT GP 3    24638850057 HC PT ELECTRICAL STIM UNATTENDED 12/21/2017 Crista Rivas, PT  1    61730393915 HC PT THER SUPP EA 15 MIN 12/21/2017 Crista Rivas, PT GP 1                    Crista Rivas, PT  12/21/2017

## 2017-12-28 ENCOUNTER — HOSPITAL ENCOUNTER (OUTPATIENT)
Dept: PHYSICAL THERAPY | Facility: HOSPITAL | Age: 52
Setting detail: THERAPIES SERIES
Discharge: HOME | End: 2017-12-28

## 2017-12-28 DIAGNOSIS — M25.512 LEFT SHOULDER PAIN, UNSPECIFIED CHRONICITY: Primary | ICD-10-CM

## 2017-12-28 PROCEDURE — G0283 ELEC STIM OTHER THAN WOUND: HCPCS

## 2017-12-28 PROCEDURE — 97110 THERAPEUTIC EXERCISES: CPT

## 2017-12-28 PROCEDURE — 97140 MANUAL THERAPY 1/> REGIONS: CPT

## 2017-12-28 NOTE — THERAPY TREATMENT NOTE
"    Outpatient Physical Therapy Ortho Treatment Note  [facility]  Sabi Berg PTA  17  2:34 PM       Patient Name: Patricia Palma  : 1965  MRN: [id number]  Today's Date: 2017      Visit Date: 2017    Subjective Improvement: 0%       Attendance: 3/3   Approved: segunal + 6           MD follow up: TBD           RC date: 18         Visit Dx:    ICD-10-CM ICD-9-CM   1. Left shoulder pain, unspecified chronicity M25.512 719.41       Patient Active Problem List   Diagnosis   • Lower abdominal pain   • Change in stool caliber   • Diverticulosis   • Diverticulosis of large intestine without hemorrhage        No past medical history on file.     Past Surgical History:   Procedure Laterality Date   • APPENDECTOMY     •  SECTION      x2   • COLONOSCOPY      10 years             PT Ortho       17 1300    Precautions and Contraindications    Precautions/Limitations no known precautions/limitations  -ALIZE    Subjective Pain    Able to rate subjective pain? yes  -ALIZE    Pre-Treatment Pain Level --   ache at rest  -ALIZE    Post-Treatment Pain Level --   \"numb\"  -ALIZE    Posture/Observations    Posture/Observations Comments No acute distress. Slight LUE guarding. Rounded shoulders, L more forward.  -      User Key  (r) = Recorded By, (t) = Taken By, (c) = Cosigned By    Initials Name Provider Type    ALIZE Sabi Berg PTA Physical Therapy Assistant                            PT Assessment/Plan       17 1300       PT Assessment    Functional Limitations Limitation in home management;Limitations in community activities;Performance in leisure activities;Performance in self-care ADL;Performance in work activities  -     Impairments Impaired flexibility;Muscle strength;Pain;Poor body mechanics;Posture;Range of motion  -     Assessment Comments pt has seen no improvement since starting physical therapy. pt not able to tolerated certain movements especially " "abduction and ER. Decrease in PROM measurements today as well.  -ALIZE     Rehab Potential Good  -ALIZE     Patient/caregiver participated in establishment of treatment plan and goals Yes  -ALIZE     Patient would benefit from skilled therapy intervention Yes  -ALIZE     PT Plan    PT Frequency 2x/week  -ALIZE     Predicted Duration of Therapy Intervention (days/wks) 4 weeks  -ALIZE     PT Plan Comments Cont gentle PROM and   -ALIZE       User Key  (r) = Recorded By, (t) = Taken By, (c) = Cosigned By    Initials Name Provider Type    ALIZE Berg PTA Physical Therapy Assistant                Modalities       12/28/17 1300          Ice    Ice Applied Yes  -ALIZE      Location L Shoulder  -ALIZE      Rx Minutes 15 mins  -ALIZE      Ice S/P Rx Yes  -ALIZE      ELECTRICAL STIMULATION    Attended/Unattended Unattended  -ALIZE      Stimulation Type IFC  -ALIZE      Location/Electrode Placement/Other L Shoulder  -ALIZE      Rx Minutes 15 mins  -ALIZE        User Key  (r) = Recorded By, (t) = Taken By, (c) = Cosigned By    Initials Name Provider Type    ALIZE Berg PTA Physical Therapy Assistant                Exercises       12/28/17 1300          Subjective Comments    Subjective Comments pt reports that therapy has not helped any thus far. [place] and that she is not able to complete most of her daily tasks becuase it requires two hands. pt really wants to have MRI to figure out was is going on.  -ALIZE      Subjective Pain    Able to rate subjective pain? yes  -ALIZE      Pre-Treatment Pain Level --   ache at rest  -ALIZE      Post-Treatment Pain Level --   \"numb\"  -ALIZE      Aquatics    Aquatics performed? No  -ALIZE      Exercise 1    Exercise Name 1 Pulleys flex  -ALIZE      Time (Minutes) 1 2 min each  -ALIZE      Exercise 2    Exercise Name 2 Scap retraction  -ALIZE      Sets 2 2  -ALIZE      Reps 2 10  -ALIZE      Time (Seconds) 2 5 sec hold  -ALIZE      Exercise 3    Exercise Name 3 shoulder shrugs  -ALIZE      Sets 3 2  -ALIZE      Reps 3 10  " -ALIZE      Exercise 4    Exercise Name 4 Table slides FF  -ALIZE      Sets 4 2  -ALIZE      Reps 4 10  -ALIZE      Exercise 5    Exercise Name 5 UT S  -ALIZE      Reps 5 2  -ALIZE      Time (Seconds) 5 30 sec hold  -ALIZE      Exercise 6    Exercise Name 6 Levator S  -ALIZE      Reps 6 2  -ALIZE      Time (Seconds) 6 30 sec hold  -ALIZE      Exercise 7    Exercise Name 7 Supine AA wood chop flexion  -ALIZE      Sets 7 2  -ALIZE      Reps 7 10  -ALIZE      Exercise 8    Exercise Name 8 Supine AA wand ER  -ALIZE      Sets 8 1  -ALIZE      Reps 8 15  -ALIEZ        User Key  (r) = Recorded By, (t) = Taken By, (c) = Cosigned By    Initials Name Provider Type    ALIZE Berg PTA Physical Therapy Assistant                        Manual Rx (last 36 hours)      Manual Treatments       12/28/17 1300          Manual Rx 1    Manual Rx 1 Location L shoulder  -ALIZE      Manual Rx 1 Type PROM flex, abd, ER/IR at 45 deg abd  -ALIZE      Manual Rx 1 Grade Gentle  -ALIZE      Manual Rx 1 Duration 10 min  -ALIZE        User Key  (r) = Recorded By, (t) = Taken By, (c) = Cosigned By    Initials Name Provider Type    ALIZE Berg PTA Physical Therapy Assistant                PT OP Goals       12/28/17 1300       PT Short Term Goals    STG Date to Achieve 12/25/17  -     STG 1 Independent/complaint with HEP  -     STG 1 Progress Progressing  -     STG 2 Tolerate 45 min treatment session without increased pain  -     STG 2 Progress Progressing  -     STG 3 Demonstrate L shoulder flex PROM to 140 deg or greater  -     STG 3 Progress Progressing  -     STG 4 Demonstrate L shoulder abd PROM to 120 deg or greater  -     STG 4 Progress Progressing  -     STG 5 Demonstrate L shoulder ER PROM to 20 deg  -     STG 5 Progress Progressing  -     Long Term Goals    LTG Date to Achieve 01/08/18  -     LTG 1 Subjectively report 60% improvement or greater  -     LTG 1 Progress Progressing  -     LTG 2 QuickDASH score to 30% or less  -     LTG 2 Progress  Progressing  -ALIZE     LTG 3 Demonstrate L shoulder flex AROM to 140 deg or greater  -     LTG 3 Progress Progressing  -ALIZE     LTG 4 Demonstrate L shoulder abd AROM to 130 deg or greater  -ALIZE     LTG 4 Progress Progressing  -ALIZE     LTG 5 Demonstrate L shoulder flex/abd MMT to 4/5  -ALIZE     LTG 5 Progress Progressing  -ALIZE     LTG 6 Demonstrate L shoulder ER/IR to 4/5  -ALIZE     LTG 6 Progress Progressing  -ALIZE     Time Calculation    PT Goal Re-Cert Due Date 01/01/18  -       User Key  (r) = Recorded By, (t) = Taken By, (c) = Cosigned By    Initials Name Provider Type    ALIZE Berg PTA Physical Therapy Assistant          Therapy Education  Given: HEP, Symptoms/condition management, Pain management, Posture/body mechanics  Program: Reinforced  How Provided: Verbal, Demonstration, Written  Provided to: Patient  Level of Understanding: Teach back education performed, Verbalized, Demonstrated              Time Calculation:   Start Time: 1300  Stop Time: 1400  Time Calculation (min): 60 min  Total Timed Code Minutes- PT: 45 minute(s)    Therapy Charges for Today     Code Description Service Date Service Provider Modifiers Qty    65333315230 HC PT THER PROC EA 15 MIN 12/28/2017 Sabi Berg PTA GP 2    86492339565 HC PT MANUAL THERAPY EA 15 MIN 12/28/2017 Sabi Berg PTA GP 1    45569636799 HC PT ELECTRICAL STIM UNATTENDED 12/28/2017 Sabi Berg PTA  1    91652171373 HC PT THER SUPP EA 15 MIN 12/28/2017 Sabi Berg PTA GP 1                    Sabi Berg PTA  12/28/2017

## 2018-01-02 ENCOUNTER — HOSPITAL ENCOUNTER (OUTPATIENT)
Dept: PHYSICAL THERAPY | Facility: HOSPITAL | Age: 53
Setting detail: THERAPIES SERIES
Discharge: HOME | End: 2018-01-02

## 2018-01-02 DIAGNOSIS — M25.512 LEFT SHOULDER PAIN, UNSPECIFIED CHRONICITY: Primary | ICD-10-CM

## 2018-01-02 PROCEDURE — 97110 THERAPEUTIC EXERCISES: CPT

## 2018-01-02 PROCEDURE — G0283 ELEC STIM OTHER THAN WOUND: HCPCS

## 2018-01-02 PROCEDURE — 97140 MANUAL THERAPY 1/> REGIONS: CPT

## 2018-01-02 NOTE — THERAPY TREATMENT NOTE
"    Outpatient Physical Therapy Ortho Treatment Note  [facility]  Sabi Berg PTA  18  4:02 PM       Patient Name: Patricia Palma  : 1965  MRN: [id number]  Today's Date: 2018      Visit Date: 2018    Subjective Improvement: 0%       Attendance:   Approved: cornelio + 6           MD follow up:TBD          RC date: 18       Visit Dx:    ICD-10-CM ICD-9-CM   1. Left shoulder pain, unspecified chronicity M25.512 719.41       Patient Active Problem List   Diagnosis   • Lower abdominal pain   • Change in stool caliber   • Diverticulosis   • Diverticulosis of large intestine without hemorrhage        No past medical history on file.     Past Surgical History:   Procedure Laterality Date   • APPENDECTOMY     •  SECTION      x2   • COLONOSCOPY      10 years             PT Ortho       18 1500    Precautions and Contraindications    Precautions/Limitations no known precautions/limitations  -ALIZE    Subjective Pain    Able to rate subjective pain? yes  -ALIZE    Post-Treatment Pain Level --   \"NUMB\"  -      User Key  (r) = Recorded By, (t) = Taken By, (c) = Cosigned By    Initials Name Provider Type    ALIZE Sabi Berg PTA Physical Therapy Assistant                            PT Assessment/Plan       18 1500       PT Assessment    Functional Limitations Limitation in home management;Limitations in community activities;Performance in leisure activities;Performance in self-care ADL;Performance in work activities  -     Impairments Impaired flexibility;Muscle strength;Pain;Poor body mechanics;Posture;Range of motion  -     Assessment Comments pt continues to see no improvement with in L shoulder. pts ROM has not improved since starting therapy. At times during PROM pain is very sharp and she cannot endure it.   -ALIZE     Rehab Potential Good  -ALIZE     Patient/caregiver participated in establishment of treatment plan and goals Yes  -ALIZE     Patient would " "benefit from skilled therapy intervention Yes  -ALIZE     PT Plan    PT Frequency 2x/week  -ALIZE     Predicted Duration of Therapy Intervention (days/wks) 4 weeks  -ALIZE     PT Plan Comments Encouraged to make MD follow up appt.  -ALIZE       User Key  (r) = Recorded By, (t) = Taken By, (c) = Cosigned By    Initials Name Provider Type    ALIZE Berg PTA Physical Therapy Assistant                Modalities       01/02/18 1500          Ice    Ice Applied Yes  -ALIZE      Location L Shoulder  -ALIZE      Rx Minutes 15 mins  -ALIZE      Ice S/P Rx Yes  -ALIZE      ELECTRICAL STIMULATION    Attended/Unattended Unattended  -ALIZE      Stimulation Type IFC  -ALIZE      Location/Electrode Placement/Other L Shoulder  -ALIZE      Rx Minutes 15 mins  -ALIZE        User Key  (r) = Recorded By, (t) = Taken By, (c) = Cosigned By    Initials Name Provider Type    ALIZE Berg PTA Physical Therapy Assistant                Exercises       01/02/18 1500          Subjective Comments    Subjective Comments pt states no progress at this time. pt reports that anything that is waist level and below she can do but anything above that gives her a lot of problems.   -ALIZE      Subjective Pain    Able to rate subjective pain? yes  -ALIZE      Pre-Treatment Pain Level --   dull ache  -ALIZE      Post-Treatment Pain Level --   \"NUMB\"  -ALIZE      Aquatics    Aquatics performed? No  -ALIZE      Exercise 1    Exercise Name 1 Pulleys flex  -ALIZE      Time (Minutes) 1 2 min each  -ALIZE      Exercise 2    Exercise Name 2 Scap retraction  -ALIZE      Sets 2 2  -ALIZE      Reps 2 10  -ALIZE      Time (Seconds) 2 5 sec hold  -ALIZE      Exercise 3    Exercise Name 3 shoulder shrugs  -ALIZE      Sets 3 2  -ALIZE      Reps 3 10  -ALIZE      Exercise 4    Exercise Name 4 Table slides FF  -ALIZE      Sets 4 2  -ALIZE      Reps 4 10  -ALIZE      Exercise 5    Exercise Name 5 UT S  -ALIZE      Reps 5 2  -ALIZE      Time (Seconds) 5 30 sec hold  -ALIZE      Exercise 6    Exercise Name 6 Levator S  -ALIZE      Reps 6 2  -ALIZE      " Time (Seconds) 6 30 sec hold  -ALIZE      Exercise 7    Exercise Name 7 Supine AA wood chop flexion  -ALIZE      Sets 7 2  -ALIZE      Reps 7 10  -ALIZE      Exercise 8    Exercise Name 8 Supine AA wand ER  -ALIZE      Sets 8 1  -ALIZE      Reps 8 15  -ALIZE        User Key  (r) = Recorded By, (t) = Taken By, (c) = Cosigned By    Initials Name Provider Type    ALIZE Berg PTA Physical Therapy Assistant                        Manual Rx (last 36 hours)      Manual Treatments       01/02/18 1500          Manual Rx 1    Manual Rx 1 Location L shoulder  -      Manual Rx 1 Type PROM flex, abd, ER/IR at 45 deg abd  -      Manual Rx 1 Grade Gentle  -ALIZE      Manual Rx 1 Duration 10 min  -ALIZE        User Key  (r) = Recorded By, (t) = Taken By, (c) = Cosigned By    Initials Name Provider Type    ALIZE Berg PTA Physical Therapy Assistant                PT OP Goals       01/02/18 1500       PT Short Term Goals    STG Date to Achieve 12/25/17  -     STG 1 Independent/complaint with HEP  -     STG 1 Progress Progressing  -     STG 2 Tolerate 45 min treatment session without increased pain  -     STG 2 Progress Progressing  -ALIZE     STG 3 Demonstrate L shoulder flex PROM to 140 deg or greater  -     STG 3 Progress Progressing  -ALIZE     STG 4 Demonstrate L shoulder abd PROM to 120 deg or greater  -     STG 4 Progress Progressing  -     STG 5 Demonstrate L shoulder ER PROM to 20 deg  -     STG 5 Progress Progressing  -     Long Term Goals    LTG Date to Achieve 01/08/18  -     LTG 1 Subjectively report 60% improvement or greater  -     LTG 1 Progress Progressing  -     LTG 2 QuickDASH score to 30% or less  -     LTG 2 Progress Progressing  -ALIZE     LTG 3 Demonstrate L shoulder flex AROM to 140 deg or greater  -     LTG 3 Progress Progressing  -     LTG 4 Demonstrate L shoulder abd AROM to 130 deg or greater  -     LTG 4 Progress Progressing  -     LTG 5 Demonstrate L shoulder flex/abd MMT to 4/5   -ALIZE     LTG 5 Progress Progressing  -ALIZE     LTG 6 Demonstrate L shoulder ER/IR to 4/5  -ALIZE     LTG 6 Progress Progressing  -ALIZE     Time Calculation    PT Goal Re-Cert Due Date 01/01/18  -ALIZE       User Key  (r) = Recorded By, (t) = Taken By, (c) = Cosigned By    Initials Name Provider Type    ALIZE Berg PTA Physical Therapy Assistant          Therapy Education  Given: HEP, Symptoms/condition management, Pain management, Posture/body mechanics  Program: Reinforced  How Provided: Verbal, Demonstration, Written  Provided to: Patient  Level of Understanding: Teach back education performed, Verbalized, Demonstrated              Time Calculation:   Start Time: 1517  Stop Time: 1610  Time Calculation (min): 53 min  Total Timed Code Minutes- PT: 38 minute(s)    Therapy Charges for Today     Code Description Service Date Service Provider Modifiers Qty    77001840441 HC PT THER PROC EA 15 MIN 1/2/2018 Sabi Berg, PTA GP 2    63046465051 HC PT MANUAL THERAPY EA 15 MIN 1/2/2018 Sabi Berg, PTA GP 1    05817836086 HC PT ELECTRICAL STIM UNATTENDED 1/2/2018 Sabi Berg, PTA  1    20556292408 HC PT THER SUPP EA 15 MIN 1/2/2018 Sabi Berg, FERNANDO GP 1                    Sabi Berg PTA  1/2/2018

## 2018-01-03 ENCOUNTER — TRANSCRIBE ORDERS (OUTPATIENT)
Dept: ADMINISTRATIVE | Facility: HOSPITAL | Age: 53
End: 2018-01-03

## 2018-01-03 DIAGNOSIS — M25.519 SHOULDER PAIN, UNSPECIFIED CHRONICITY, UNSPECIFIED LATERALITY: Primary | ICD-10-CM

## 2018-01-05 ENCOUNTER — APPOINTMENT (OUTPATIENT)
Dept: PHYSICAL THERAPY | Facility: HOSPITAL | Age: 53
End: 2018-01-05

## 2018-01-10 ENCOUNTER — APPOINTMENT (OUTPATIENT)
Dept: MRI IMAGING | Facility: HOSPITAL | Age: 53
End: 2018-01-10

## 2018-01-29 ENCOUNTER — APPOINTMENT (OUTPATIENT)
Dept: PREADMISSION TESTING | Facility: HOSPITAL | Age: 53
End: 2018-01-29

## 2018-01-29 VITALS
OXYGEN SATURATION: 98 % | HEART RATE: 77 BPM | BODY MASS INDEX: 27.58 KG/M2 | WEIGHT: 175.71 LBS | SYSTOLIC BLOOD PRESSURE: 132 MMHG | DIASTOLIC BLOOD PRESSURE: 79 MMHG | HEIGHT: 67 IN | RESPIRATION RATE: 18 BRPM

## 2018-01-29 PROBLEM — M75.02 ADHESIVE CAPSULITIS OF LEFT SHOULDER: Status: ACTIVE | Noted: 2018-01-29

## 2018-01-29 LAB
DEPRECATED RDW RBC AUTO: 39.1 FL (ref 40–54)
ERYTHROCYTE [DISTWIDTH] IN BLOOD BY AUTOMATED COUNT: 11.6 % (ref 12–15)
HCT VFR BLD AUTO: 41.6 % (ref 37–47)
HGB BLD-MCNC: 14 G/DL (ref 12–16)
MCH RBC QN AUTO: 30.8 PG (ref 28–32)
MCHC RBC AUTO-ENTMCNC: 33.7 G/DL (ref 33–36)
MCV RBC AUTO: 91.6 FL (ref 82–98)
PLATELET # BLD AUTO: 301 10*3/MM3 (ref 130–400)
PMV BLD AUTO: 10.4 FL (ref 6–12)
RBC # BLD AUTO: 4.54 10*6/MM3 (ref 4.2–5.4)
WBC NRBC COR # BLD: 7.18 10*3/MM3 (ref 4.8–10.8)

## 2018-01-29 PROCEDURE — 85027 COMPLETE CBC AUTOMATED: CPT | Performed by: ORTHOPAEDIC SURGERY

## 2018-01-30 ENCOUNTER — ANESTHESIA EVENT (OUTPATIENT)
Dept: PREOP | Facility: HOSPITAL | Age: 53
End: 2018-01-30

## 2018-01-30 ENCOUNTER — APPOINTMENT (OUTPATIENT)
Dept: PREOP | Facility: HOSPITAL | Age: 53
End: 2018-01-30

## 2018-01-30 ENCOUNTER — ANESTHESIA (OUTPATIENT)
Dept: PREOP | Facility: HOSPITAL | Age: 53
End: 2018-01-30

## 2018-01-30 ENCOUNTER — HOSPITAL ENCOUNTER (OUTPATIENT)
Dept: PREOP | Facility: HOSPITAL | Age: 53
Discharge: HOME | End: 2018-01-30
Admitting: ORTHOPAEDIC SURGERY

## 2018-01-30 ENCOUNTER — DOCUMENTATION (OUTPATIENT)
Dept: PHYSICAL THERAPY | Facility: HOSPITAL | Age: 53
End: 2018-01-30

## 2018-01-30 VITALS
TEMPERATURE: 97.6 F | RESPIRATION RATE: 16 BRPM | DIASTOLIC BLOOD PRESSURE: 64 MMHG | OXYGEN SATURATION: 95 % | HEART RATE: 72 BPM | SYSTOLIC BLOOD PRESSURE: 112 MMHG

## 2018-01-30 DIAGNOSIS — M25.512 LEFT SHOULDER PAIN, UNSPECIFIED CHRONICITY: Primary | ICD-10-CM

## 2018-01-30 PROCEDURE — 25010000002 METHYLPREDNISOLONE PER 80 MG: Performed by: ORTHOPAEDIC SURGERY

## 2018-01-30 PROCEDURE — 25010000002 FENTANYL CITRATE (PF) 100 MCG/2ML SOLUTION: Performed by: ANESTHESIOLOGY

## 2018-01-30 PROCEDURE — 25010000002 PROPOFOL 10 MG/ML EMULSION: Performed by: NURSE ANESTHETIST, CERTIFIED REGISTERED

## 2018-01-30 PROCEDURE — 25010000002 ROPIVACAINE PER 1 MG: Performed by: ANESTHESIOLOGY

## 2018-01-30 PROCEDURE — 25010000002 MIDAZOLAM PER 1 MG: Performed by: ANESTHESIOLOGY

## 2018-01-30 RX ORDER — METHYLPREDNISOLONE ACETATE 80 MG/ML
80 INJECTION, SUSPENSION INTRA-ARTICULAR; INTRALESIONAL; INTRAMUSCULAR; SOFT TISSUE ONCE
Status: COMPLETED | OUTPATIENT
Start: 2018-01-30 | End: 2018-01-30

## 2018-01-30 RX ORDER — MORPHINE SULFATE 2 MG/ML
2 INJECTION, SOLUTION INTRAMUSCULAR; INTRAVENOUS
Status: CANCELLED | OUTPATIENT
Start: 2018-01-30 | End: 2018-01-30

## 2018-01-30 RX ORDER — PROPOFOL 10 MG/ML
VIAL (ML) INTRAVENOUS AS NEEDED
Status: DISCONTINUED | OUTPATIENT
Start: 2018-01-30 | End: 2018-01-30 | Stop reason: SURG

## 2018-01-30 RX ORDER — MEPERIDINE HYDROCHLORIDE 25 MG/ML
12.5 INJECTION INTRAMUSCULAR; INTRAVENOUS; SUBCUTANEOUS
Status: CANCELLED | OUTPATIENT
Start: 2018-01-30 | End: 2018-01-31

## 2018-01-30 RX ORDER — HYDROCODONE BITARTRATE AND ACETAMINOPHEN 5; 325 MG/1; MG/1
TABLET ORAL
Qty: 20 TABLET | Refills: 0 | Status: ON HOLD | OUTPATIENT
Start: 2018-01-30 | End: 2018-03-08

## 2018-01-30 RX ORDER — SODIUM CHLORIDE, SODIUM LACTATE, POTASSIUM CHLORIDE, CALCIUM CHLORIDE 600; 310; 30; 20 MG/100ML; MG/100ML; MG/100ML; MG/100ML
1000 INJECTION, SOLUTION INTRAVENOUS CONTINUOUS
Status: DISCONTINUED | OUTPATIENT
Start: 2018-01-30 | End: 2018-01-31 | Stop reason: HOSPADM

## 2018-01-30 RX ORDER — HYDRALAZINE HYDROCHLORIDE 20 MG/ML
5 INJECTION INTRAMUSCULAR; INTRAVENOUS
Status: CANCELLED | OUTPATIENT
Start: 2018-01-30

## 2018-01-30 RX ORDER — HYDROCODONE BITARTRATE AND ACETAMINOPHEN 5; 325 MG/1; MG/1
1 TABLET ORAL ONCE AS NEEDED
Status: DISCONTINUED | OUTPATIENT
Start: 2018-01-30 | End: 2018-01-31 | Stop reason: HOSPADM

## 2018-01-30 RX ORDER — ROPIVACAINE HYDROCHLORIDE 2 MG/ML
INJECTION, SOLUTION EPIDURAL; INFILTRATION; PERINEURAL AS NEEDED
Status: DISCONTINUED | OUTPATIENT
Start: 2018-01-30 | End: 2018-01-30 | Stop reason: SURG

## 2018-01-30 RX ORDER — DIPHENHYDRAMINE HYDROCHLORIDE 50 MG/ML
12.5 INJECTION INTRAMUSCULAR; INTRAVENOUS
Status: CANCELLED | OUTPATIENT
Start: 2018-01-30

## 2018-01-30 RX ORDER — DEXTROSE MONOHYDRATE 25 G/50ML
12.5 INJECTION, SOLUTION INTRAVENOUS AS NEEDED
Status: DISCONTINUED | OUTPATIENT
Start: 2018-01-30 | End: 2018-01-31 | Stop reason: HOSPADM

## 2018-01-30 RX ORDER — MIDAZOLAM HYDROCHLORIDE 1 MG/ML
2 INJECTION INTRAMUSCULAR; INTRAVENOUS
Status: DISCONTINUED | OUTPATIENT
Start: 2018-01-30 | End: 2018-01-31 | Stop reason: HOSPADM

## 2018-01-30 RX ORDER — SODIUM CHLORIDE, SODIUM LACTATE, POTASSIUM CHLORIDE, CALCIUM CHLORIDE 600; 310; 30; 20 MG/100ML; MG/100ML; MG/100ML; MG/100ML
9 INJECTION, SOLUTION INTRAVENOUS CONTINUOUS
Status: DISCONTINUED | OUTPATIENT
Start: 2018-01-30 | End: 2018-01-31 | Stop reason: HOSPADM

## 2018-01-30 RX ORDER — ONDANSETRON 2 MG/ML
4 INJECTION INTRAMUSCULAR; INTRAVENOUS ONCE AS NEEDED
Status: CANCELLED | OUTPATIENT
Start: 2018-01-30

## 2018-01-30 RX ORDER — MIDAZOLAM HYDROCHLORIDE 1 MG/ML
1 INJECTION INTRAMUSCULAR; INTRAVENOUS
Status: DISCONTINUED | OUTPATIENT
Start: 2018-01-30 | End: 2018-01-31 | Stop reason: HOSPADM

## 2018-01-30 RX ORDER — ONDANSETRON 2 MG/ML
4 INJECTION INTRAMUSCULAR; INTRAVENOUS ONCE AS NEEDED
Status: DISCONTINUED | OUTPATIENT
Start: 2018-01-30 | End: 2018-01-31 | Stop reason: HOSPADM

## 2018-01-30 RX ORDER — IPRATROPIUM BROMIDE AND ALBUTEROL SULFATE 2.5; .5 MG/3ML; MG/3ML
3 SOLUTION RESPIRATORY (INHALATION) ONCE AS NEEDED
Status: CANCELLED | OUTPATIENT
Start: 2018-01-30

## 2018-01-30 RX ORDER — SODIUM CHLORIDE 0.9 % (FLUSH) 0.9 %
3 SYRINGE (ML) INJECTION AS NEEDED
Status: DISCONTINUED | OUTPATIENT
Start: 2018-01-30 | End: 2018-01-31 | Stop reason: HOSPADM

## 2018-01-30 RX ORDER — SODIUM CHLORIDE 0.9 % (FLUSH) 0.9 %
1-10 SYRINGE (ML) INJECTION AS NEEDED
Status: DISCONTINUED | OUTPATIENT
Start: 2018-01-30 | End: 2018-01-31 | Stop reason: HOSPADM

## 2018-01-30 RX ORDER — LIDOCAINE HYDROCHLORIDE 10 MG/ML
9 INJECTION, SOLUTION INFILTRATION; PERINEURAL ONCE
Status: COMPLETED | OUTPATIENT
Start: 2018-01-30 | End: 2018-01-30

## 2018-01-30 RX ORDER — NALOXONE HCL 0.4 MG/ML
0.4 VIAL (ML) INJECTION AS NEEDED
Status: CANCELLED | OUTPATIENT
Start: 2018-01-30

## 2018-01-30 RX ORDER — FENTANYL CITRATE 50 UG/ML
25 INJECTION, SOLUTION INTRAMUSCULAR; INTRAVENOUS
Status: DISCONTINUED | OUTPATIENT
Start: 2018-01-30 | End: 2018-01-31 | Stop reason: HOSPADM

## 2018-01-30 RX ORDER — LABETALOL HYDROCHLORIDE 5 MG/ML
5 INJECTION, SOLUTION INTRAVENOUS
Status: CANCELLED | OUTPATIENT
Start: 2018-01-30

## 2018-01-30 RX ADMIN — METHYLPREDNISOLONE ACETATE 80 MG: 80 INJECTION, SUSPENSION INTRA-ARTICULAR; INTRALESIONAL; INTRAMUSCULAR; SOFT TISSUE at 09:45

## 2018-01-30 RX ADMIN — MIDAZOLAM HYDROCHLORIDE 2 MG: 1 INJECTION, SOLUTION INTRAMUSCULAR; INTRAVENOUS at 09:31

## 2018-01-30 RX ADMIN — LIDOCAINE HYDROCHLORIDE 9 ML: 10 INJECTION, SOLUTION INFILTRATION; PERINEURAL at 09:45

## 2018-01-30 RX ADMIN — LIDOCAINE HYDROCHLORIDE 0.5 ML: 10 INJECTION, SOLUTION EPIDURAL; INFILTRATION; INTRACAUDAL; PERINEURAL at 08:35

## 2018-01-30 RX ADMIN — SODIUM CHLORIDE, POTASSIUM CHLORIDE, SODIUM LACTATE AND CALCIUM CHLORIDE 1000 ML: 600; 310; 30; 20 INJECTION, SOLUTION INTRAVENOUS at 08:35

## 2018-01-30 RX ADMIN — FENTANYL CITRATE 100 MCG: 50 INJECTION, SOLUTION INTRAMUSCULAR; INTRAVENOUS at 09:34

## 2018-01-30 RX ADMIN — ROPIVACAINE HYDROCHLORIDE 20 ML: 2 INJECTION, SOLUTION EPIDURAL; INFILTRATION at 09:36

## 2018-01-30 RX ADMIN — PROPOFOL 50 MG: 10 INJECTION, EMULSION INTRAVENOUS at 09:45

## 2018-01-30 NOTE — ANESTHESIA POSTPROCEDURE EVALUATION
Patient: Patricia Palma    Procedure Summary     Date Anesthesia Start Anesthesia Stop Room / Location    01/30/18 0944 0953 [facility] PREOP PHASE II       Procedure Diagnosis Scheduled Providers Provider    MANIPULATION OF JOINT No diagnosis on file.  Rehan Hogan CRNA          Anesthesia Type: MAC  Last vitals  BP   96/57 (01/30/18 0951)   Temp   98.4 °F (36.9 °C) (01/30/18 0828)   Pulse   71 (01/30/18 0951)   Resp   12 (01/30/18 0951)     SpO2   92 % (01/30/18 0951)     Post Anesthesia Care and Evaluation    Patient location during evaluation: PHASE II  Patient participation: complete - patient participated  Level of consciousness: awake and alert  Pain score: 0  Pain management: adequate  Airway patency: patent  Anesthetic complications: No anesthetic complications  PONV Status: none  Cardiovascular status: acceptable and stable  Respiratory status: acceptable  Hydration status: acceptable

## 2018-01-30 NOTE — PLAN OF CARE
Problem: Patient Care Overview (Adult)  Goal: Plan of Care Review  Outcome: Ongoing (interventions implemented as appropriate)   01/30/18 0916   Coping/Psychosocial Response Interventions   Plan Of Care Reviewed With patient   Patient Care Overview   Progress no change       Problem: Perioperative Period (Adult)  Goal: Signs and Symptoms of Listed Potential Problems Will be Absent or Manageable (Perioperative Period)  Outcome: Ongoing (interventions implemented as appropriate)   01/30/18 0916   Perioperative Period   Problems Assessed (Perioperative Period) pain;perioperative injury;infection;situational response   Problems Present (Perioperative Period) situational response;pain

## 2018-01-30 NOTE — ANESTHESIA PROCEDURE NOTES
Peripheral Block    Patient location during procedure: holding area  Start time: 1/30/2018 9:35 AM  Stop time: 1/30/2018 9:37 AM  Reason for block: post-op pain management  Performed by  Anesthesiologist: LEONEL GUTIERREZ  Preanesthetic Checklist  Completed: patient identified, site marked, surgical consent, pre-op evaluation, timeout performed, IV checked, risks and benefits discussed and monitors and equipment checked  Prep:  Sterile barriers:gloves  Prep: ChloraPrep  Patient monitoring: blood pressure monitoring, continuous pulse oximetry and EKG  Procedure  Sedation:yes    Guidance:ultrasound guided and nerve stimulator  Images:still images obtained  Loss of twitch: 0.5 mA  Laterality:left  Block Type:interscalene  Injection Technique:single-shot  Needle Type:echogenic  Needle Gauge:22 G    Medications  Local Injected:ropivacaine 0.2% Local Amount Injected:20mL  Post Assessment  Injection Assessment: negative aspiration for heme, no paresthesia on injection and incremental injection  Patient Tolerance:comfortable throughout block  Complications:no

## 2018-01-30 NOTE — DISCHARGE INSTRUCTIONS
UPPER EXTREMITY POST-OP INSTRUCTIONS - DR. SUAREZ    IMPORTANT PHONE NUMBERS:  • For emergencies, please call [phone]  • You may reach Dr. Suarez and clinical staff at [phone] M-[phone] 8:00 am-5:00 pm  • After 5pm or on the weekends, please call [phone]  • Call immediately if you have any of the following symptoms:     Elevated temperature above 101.5 degrees for more than 48 hours after surgery     Persistent drainage from wound     Severe pain around surgical site    Sling use: The sling is provided for your comfort and to ensure proper healing of your repair following surgery. Please place the abduction pillow with the curved side against your side and the sling on the side of the pillow. Your surgery requires that you wear the sling if noted below.  _X___ For comfort. Remove sling 24 hours and begin range of motion exercises  ____ At all times except bathing, dressing, and therapy. Also wear the sling during sleep.  ____ No sling required    Bathing:  _X__No bandages, no restrictions!!  ___You may remove you dressing and shower on the 3rd day after surgery (Ex. Tues surgery, shower on Friday)  ** if you are told to it is ok to remove your dressing and shower, DO NOT SOAK your incisions in a tub.  ___Keep splint clean, dry, and intact. DO NOT place foreign objects into your splint.      Dressings: Keep dressing/splint intact unless instructed otherwise below. SOME DRAINAGE IS NORMAL!    • DO NOT touch or apply ointment to the incision.    • DO NOT remove the steri-strips over the incisions (if you have steri-strips). They will         generally fall off on their own or can be removed 1 weeksafter surgery.    • If you have yellow gauze and it comes off, do not worry about it. Leave them off.   • Signs of infection that warrant a phone call to our clinical line:     o Excessive drainage or redness     o Red streaking coming away from the incision  o Increased pain  o Increased temperature  above 101 degrees      Physical Therapy:        *  Your physical therapy status will be discussed with you postoperatively and at your first post-op appointment. Some injuries will not require physical therapy.      *  If you have a shoulder manipulation, please schedule therapy for the next day      Medications: You will be discharged with the appropriate medications following your surgery. Fill these at the pharmacy and take them as directed on the label. Not all of the medications below may be prescribed. Occasionally, other medications may be prescribed with specific instructions.    Percocet/Lortab (oxycodone/hydrocodone with tylenol) - Pain Medication, will cause drowsiness, possibly itchiness (this is NOT an allergy - use benadryl or an over the counter allergy medication such as Claritin or Zyrtec)     o Take 1-2 tablets every 4-6 hours. DO NOT EXCEED 4,000mg of Tylenol in 24 hours.  **DO NOT MIX WITH ALCOHOL, DRIVE WHILE TAKING, OR TAKE with extra TYLENOL**    Colace (Docusate) - stool softener, used for constipation. Take this only if you feel constipated.      Zofran (Ondansetron) or Phenergan - Anti-nausea medication, will cause drowsiness      **If you are running low on pain medications, please notify us if you need a refill 24-48 hours prior to when you run out, so we can make arrangements to refill the prescription for you if we determine is necessary    Moderate Conscious Sedation, Adult, Care After  Refer to this sheet in the next few weeks. These instructions provide you with information on caring for yourself after your procedure. Your health care provider may also give you more specific instructions. Your treatment has been planned according to current medical practices, but problems sometimes occur. Call your health care provider if you have any problems or questions after your procedure.  WHAT TO EXPECT AFTER THE PROCEDURE    After your procedure:  · You may feel sleepy, clumsy, and have poor  balance for several hours.  · Vomiting may occur if you eat too soon after the procedure.  HOME CARE INSTRUCTIONS  · Do not participate in any activities where you could become injured for at least 24 hours. Do not:  ¨ Drive.  ¨ Swim.  ¨ Ride a bicycle.  ¨ Operate heavy machinery.  ¨ Cook.  ¨ Use power tools.  ¨ Climb ladders.  ¨ Work from a high place.  · Do not make important decisions or sign legal documents until you are improved.  · If you vomit, drink water, juice, or soup when you can drink without vomiting. Make sure you have little or no nausea before eating solid foods.  · Only take over-the-counter or prescription medicines for pain, discomfort, or fever as directed by your health care provider.  · Make sure you and your family fully understand everything about the medicines given to you, including what side effects may occur.  · You should not drink alcohol, take sleeping pills, or take medicines that cause drowsiness for at least 24 hours.  · If you smoke, do not smoke without supervision.  · If you are feeling better, you may resume normal activities 24 hours after you were sedated.  · Keep all appointments with your health care provider.  SEEK MEDICAL CARE IF:  · Your skin is pale or bluish in color.  · You continue to feel nauseous or vomit.  · Your pain is getting worse and is not helped by medicine.  · You have bleeding or swelling.  · You are still sleepy or feeling clumsy after 24 hours.  SEEK IMMEDIATE MEDICAL CARE IF:  · You develop a rash.  · You have difficulty breathing.  · You develop any type of allergic problem.  · You have a fever.  MAKE SURE YOU:  · Understand these instructions.  · Will watch your condition.  · Will get help right away if you are not doing well or get worse.     This information is not intended to replace advice given to you by your health care provider. Make sure you discuss any questions you have with your health care provider.     Document Released: 10/08/2014  Document Revised: 01/08/2016 Document Reviewed: 10/08/2014  [employer] Interactive Patient Education ©2016 [employer]         CALL YOUR PHYSICIAN IF YOU EXPERIENCE  INCREASED PAIN NOT HELPED BY YOUR PAIN MEDICATION.        Fall Prevention in the Home      Falls can cause injuries. They can happen to people of all ages. There are many things you can do to make your home safe and to help prevent falls.    WHAT CAN I DO ON THE OUTSIDE OF MY HOME?  · Regularly fix the edges of walkways and driveways and fix any cracks.  · Remove anything that might make you trip as you walk through a door, such as a raised step or threshold.  · Trim any bushes or trees on the path to your home.  · Use bright outdoor lighting.  · Clear any walking paths of anything that might make someone trip, such as rocks or tools.  · Regularly check to see if handrails are loose or broken. Make sure that both sides of any steps have handrails.  · Any raised decks and porches should have guardrails on the edges.  · Have any leaves, snow, or ice cleared regularly.  · Use sand or salt on walking paths during winter.  · Clean up any spills in your garage right away. This includes oil or grease spills.  WHAT CAN I DO IN THE BATHROOM?    · Use night lights.  · Install grab bars by the toilet and in the tub and shower. Do not use towel bars as grab bars.  · Use non-skid mats or decals in the tub or shower.  · If you need to sit down in the shower, use a plastic, non-slip stool.  · Keep the floor dry. Clean up any water that spills on the floor as soon as it happens.  · Remove soap buildup in the tub or shower regularly.  · Attach bath mats securely with double-sided non-slip rug tape.  · Do not have throw rugs and other things on the floor that can make you trip.  WHAT CAN I DO IN THE BEDROOM?  · Use night lights.  · Make sure that you have a light by your bed that is easy to reach.  · Do not use any sheets or blankets that are too big for your bed. They  should not hang down onto the floor.  · Have a firm chair that has side arms. You can use this for support while you get dressed.  · Do not have throw rugs and other things on the floor that can make you trip.  WHAT CAN I DO IN THE KITCHEN?  · Clean up any spills right away.  · Avoid walking on wet floors.  · Keep items that you use a lot in easy-to-reach places.  · If you need to reach something above you, use a strong step stool that has a grab bar.  · Keep electrical cords out of the way.  · Do not use floor polish or wax that makes floors slippery. If you must use wax, use non-skid floor wax.  · Do not have throw rugs and other things on the floor that can make you trip.  WHAT CAN I DO WITH MY STAIRS?  · Do not leave any items on the stairs.  · Make sure that there are handrails on both sides of the stairs and use them. Fix handrails that are broken or loose. Make sure that handrails are as long as the stairways.  · Check any carpeting to make sure that it is firmly attached to the stairs. Fix any carpet that is loose or worn.  · Avoid having throw rugs at the top or bottom of the stairs. If you do have throw rugs, attach them to the floor with carpet tape.  · Make sure that you have a light switch at the top of the stairs and the bottom of the stairs. If you do not have them, ask someone to add them for you.  WHAT ELSE CAN I DO TO HELP PREVENT FALLS?  · Wear shoes that:  ¨ Do not have high heels.  ¨ Have rubber bottoms.  ¨ Are comfortable and fit you well.  ¨ Are closed at the toe. Do not wear sandals.  · If you use a stepladder:  ¨ Make sure that it is fully opened. Do not climb a closed stepladder.  ¨ Make sure that both sides of the stepladder are locked into place.  ¨ Ask someone to hold it for you, if possible.  · Clearly rose and make sure that you can see:  ¨ Any grab bars or handrails.  ¨ First and last steps.  ¨ Where the edge of each step is.  · Use tools that help you move around (mobility aids) if  they are needed. These include:  ¨ Canes.  ¨ Walkers.  ¨ Scooters.  ¨ Crutches.  · Turn on the lights when you go into a dark area. Replace any light bulbs as soon as they burn out.  · Set up your furniture so you have a clear path. Avoid moving your furniture around.  · If any of your floors are uneven, fix them.  · If there are any pets around you, be aware of where they are.  · Review your medicines with your doctor. Some medicines can make you feel dizzy. This can increase your chance of falling.  Ask your doctor what other things that you can do to help prevent falls.     This information is not intended to replace advice given to you by your health care provider. Make sure you discuss any questions you have with your health care provider.     Document Released: 10/14/2010 Document Revised: 05/03/2016 Document Reviewed: 01/22/2016  [employer] Interactive Patient Education ©2016 [employer]     What to expect after a Nerve Block  Nerve blocks administered to block pain affect many types of nerves, including those nerves that control movement, pain, and normal sensation.  Following a nerve block, you may notice some bruising at the site where the block was given.  You may experience sensations such as:  numbness of the affected area or limb, tingling, heaviness (that is the limb feels heavy to you), weakness or inability to move the affected arm or leg, or a feeling as if your arm or leg has “fallen asleep”.    A nerve block can last from 9-18 hours depending on the medications used.  Usually the weakness wears off first followed by the tingling and heaviness.  As the block wears off, you may begin to notice pain; however, this sequence of events may occur in any order.  Typically, you will be able to move your limb before you will feel it.  Once a nerve block begins to wear off, the effects are usually completely gone within 60 minutes.    If you experience continued side effects that you believe are block  related for longer than 48 hours, please call your healthcare provider.  Please see block-specific instructions below.    Instructions for any block involving the shoulder or arm  • If you have had any kind of shoulder/arm block, you will go home with your arm in a sling.  Wear the sling until the block has completely worn off.  You may be required to wear it for a longer period of time per your surgeon’s recommendations.  • I you have had a shoulder/arm block; it is a good idea to sleep on a recliner with pillows under your arm.    Note:  If you have severe or prolonged shortness of breath, please seek medical assistance as soon as possible.    Protection of a “blocked” arm (limb)  • After a nerve block, you cannot feel pain, pressure, or extremes of temperature in the affected limb.  And because of this, your blocked limb is at more risk for injury.  For example, it is possible to burn your limb on an extremely hot surface without feeling it.  • When resting, it is important to reposition your limb periodically to avoid prolonged pressure on it.  This may require the use of pillows and padding.  • While sleeping, you should avoid rolling onto the affected limb or putting too much pressure on it.  • If you have a cast or tight dressing, check the color of your fingers of the affected limb.  Call your surgeon if they look discolored (that is, dusky, dark colored)  • Use caution in cold weather.  Cover your limb appropriately to protect it from the cold.  Pain Management  Your surgeon will give you a prescription for pain medication.  Begin taking this before the nerve block wears off.  Bear in mind that sometimes the block can wear off in the middle of the night.  PATIENT/FAMILY/RESPONSIBLE PARTY VERBALIZES UNDERSTANDING OF ABOVE EDUCATION.  COPY OF PAIN SCALE GIVEN AND REVIEWED WITH VERBALIZED UNDERSTANDING.

## 2018-01-30 NOTE — ANESTHESIA PREPROCEDURE EVALUATION
Anesthesia Evaluation     Patient summary reviewed   no history of anesthetic complications:  NPO Solid Status: > 8 hours       Airway   Mallampati: I  TM distance: >3 FB  Neck ROM: full  Dental          Pulmonary - negative pulmonary ROS   Cardiovascular - negative cardio ROS  Exercise tolerance: good (4-7 METS)        Neuro/Psych- negative ROS  GI/Hepatic/Renal/Endo - negative ROS     Musculoskeletal     Abdominal    Substance History      OB/GYN          Other                                                Anesthesia Plan    ASA 1     MAC     intravenous induction   Anesthetic plan and risks discussed with patient.

## 2018-01-30 NOTE — OP NOTE
Patient Name: Casey  MRN: [id number]  : 1965    DATE of SURGERY: 18    SURGEON: Virgil Fagan MD    ASSISTANT: NONE    PREOP DIAGNOSIS  Left shoulder adhesive capsulitis.       POSTOP DIAGNOSIS  Left shoulder adhesive capsulitis.       PROCEDURE  1.  Left shoulder manipulation under anesthesia.    2.  Left shoulder glenohumeral joint corticosteroid injection.       IMPLANTS  None.       ANESTHESIA  MAC, interscalene block.       OPERATIVE INDICATIONS  The patient is a 52 y.o. female who presented to my clinic with complaints of progressively losing function and motion of the shoulder. Clinical exam was consistent with adhesive capsulitis with active and passive motion decreased and symmetrical.  Having failed conservative treatment, I recommended the above-named surgery.  The risks included that of anesthesia, pain, intraoperative fracture, loss of function, continued loss of motion.     ESTIMATED BLOOD LOSS  None.       PROCEDURE IN DETAIL    The patient was seen in the preop holding room; once again, the informed consent form was reviewed with the patient and signed.  The site of surgery was marked with the patients agreement.   The anesthesia team then performed a successful interscalene blockade and the patient was transported to the post-anesthesia care unit.     After induction of anesthesia, pre-manipulation range of motion was recorded  as follows:  1) Forward flexion: 120  2) External rotation in adduction: 10  3) External rotation in abduction: 20  4) Internal rotation in abduction:  10     The shoulder was then manipulated systematically, first by forward flexing the shoulder while externally rotating the extremity.  This was followed by extension, abduction, rotation, and finally cross arm adduction.  The capsule was noted to rupture both audibly as well as with palpation.     Post-manipulation range of motion was as follows:  1) Forward flexion: 180  2) External rotation in  adduction: 60  3) External rotation in abduction:  90  4) Internal rotation in abduction:  80     After successfully manipulating the shoulder, the coracoid process was palpated and a sterile prep was performed just lateral to it.  Then, 4 mL 1% lidocaine, 4 mL 0.25% Marcaine, and 80 mg of Depo-Medrol were injected successfully in to the glenohumeral joint with a 22 gauge needle.  A band-aid was applied. The patient was awakened by anesthesia, placed in a sling, and transported to the preop area in stable condition.     POSTOP PLAN  Begin range of motion immediately.  A physical therapy prescription has been given for passive and active-assist range of motion to begin on postop day No. 1.  The patient will follow up in clinic in 1 week for a clinical check.        Electronically signed by Virgil Fagan MD on 1/30/2018 at 9:47 AM

## 2018-01-30 NOTE — H&P
Pt Name: Patricia Palma  MRN: [id number]  YOB: 1965  Date of evaluation: 1/30/2018    H&P including current review of systems was updated in the paper chart and/or the document previously scanned into the record.  There have been no significant changes or new problems since the original evaluation.  The patient's problems continue and indications for contemplated procedure have not changed.    Electronically signed by Virgil Fagan MD on 1/30/2018 at 9:42 AM

## 2018-01-30 NOTE — PLAN OF CARE
Problem: Patient Care Overview (Adult)  Goal: Plan of Care Review  Outcome: Outcome(s) achieved Date Met: 01/30/18  Sling placed at discharge per orders bc pt had block on that shoulder/arm- instructions given about how long to wear sling and what to expect with block.   01/30/18 1058   Coping/Psychosocial Response Interventions   Plan Of Care Reviewed With patient;spouse   Patient Care Overview   Progress improving   Outcome Evaluation   Outcome Summary/Follow up Plan pt meets d/c criteria       Problem: Perioperative Period (Adult)  Goal: Signs and Symptoms of Listed Potential Problems Will be Absent or Manageable (Perioperative Period)  Outcome: Outcome(s) achieved Date Met: 01/30/18

## 2018-01-31 ENCOUNTER — HOSPITAL ENCOUNTER (OUTPATIENT)
Dept: PHYSICAL THERAPY | Facility: HOSPITAL | Age: 53
Setting detail: THERAPIES SERIES
Discharge: HOME | End: 2018-01-31

## 2018-01-31 ENCOUNTER — TRANSCRIBE ORDERS (OUTPATIENT)
Dept: PHYSICAL THERAPY | Facility: HOSPITAL | Age: 53
End: 2018-01-31

## 2018-01-31 DIAGNOSIS — M75.02 ADHESIVE CAPSULITIS OF LEFT SHOULDER: Primary | ICD-10-CM

## 2018-01-31 PROCEDURE — G0283 ELEC STIM OTHER THAN WOUND: HCPCS | Performed by: PHYSICAL THERAPIST

## 2018-01-31 PROCEDURE — 97161 PT EVAL LOW COMPLEX 20 MIN: CPT | Performed by: PHYSICAL THERAPIST

## 2018-01-31 PROCEDURE — 97140 MANUAL THERAPY 1/> REGIONS: CPT | Performed by: PHYSICAL THERAPIST

## 2018-01-31 PROCEDURE — 97110 THERAPEUTIC EXERCISES: CPT | Performed by: PHYSICAL THERAPIST

## 2018-02-01 NOTE — THERAPY EVALUATION
Outpatient Physical Therapy Ortho Initial Evaluation  [facility]     Patient Name: Patricia Palma  : 1965  MRN: [id number]  Today's Date: 2018      Visit Date: 2018     Subjective Improvement:  N/A     Attendance:   Approved:   24 left for year        MD follow up: TBD          RC date:  18         Patient Active Problem List   Diagnosis   • Lower abdominal pain   • Change in stool caliber   • Diverticulosis   • Diverticulosis of large intestine without hemorrhage   • Adhesive capsulitis of left shoulder        Past Medical History:   Diagnosis Date   • Diverticulosis    • Shoulder pain, left         Past Surgical History:   Procedure Laterality Date   • APPENDECTOMY     •  SECTION      x2   • COLONOSCOPY      10 years   • SHOULDER MANIPULATION Left 2018     No Known Allergies      Current Outpatient Prescriptions:   •  Acetaminophen (TYLENOL EXTRA STRENGTH PO), Take 2 tablets by mouth As Needed (for shoulder pain)., Disp: , Rfl:   •  dicyclomine (BENTYL) 10 MG capsule, 10 mg As Needed., Disp: , Rfl:   •  Estradiol-Norethindrone Acet 0.5-0.1 MG per tablet, Take 1 tablet by mouth Daily., Disp: 90 tablet, Rfl: 3  •  HYDROcodone-acetaminophen (NORCO) 5-325 MG per tablet, 1-2 tabs po q4-6 hrs prn pain, Disp: 20 tablet, Rfl: 0  •  HYDROcodone-acetaminophen (NORCO) 5-325 MG per tablet, 1-2 tabs po q4-6 hrs prn pain, Disp: 20 tablet, Rfl: 0      Visit Dx:     ICD-10-CM ICD-9-CM   1. Adhesive capsulitis of left shoulder M75.02 726.0             Patient History       18 1400          History    Chief Complaint Pain  -KG      Type of Pain Shoulder pain  -KG      Date Current Problem(s) Began 18   ~4 months ago; s/p manipulation 17  -KG      Brief Description of Current Complaint Pt presents s/p L shoulder manipulation on 18. Pt states her L shoulder already feels much better. Was able to sleep last night for the first time in a long time. States  she's already been doing some passive exercises yesterday & today. Had PT in December for a few visits & was referred to ortho MD.  -KG      Onset Date- PT 1/31/18  -KG      Patient/Caregiver Goals Relieve pain;Improve strength;Improve mobility;Return to prior level of function  -KG      Hand Dominance right-handed  -KG      Occupation/sports/leisure activities Pt is self-employed; farm work/horses  -KG      Results of Clinical Tests No recent imaging completed.  -KG      Pain     Pain Location Shoulder  -KG      Pain at Present 1  -KG      Pain at Best 1  -KG      Pain at Worst 4  -KG      Pain Frequency Intermittent  -KG      Pain Description Aching  -KG      What Performance Factors Make the Current Problem(s) WORSE? Reaching acitvities  -KG      What Performance Factors Make the Current Problem(s) BETTER? 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  -KG    Precautions and Contraindications    Precautions/Limitations no known precautions/limitations  -KG    Contraindications s/p L shoulder manipulation 1/30/18  -KG    Subjective Pain    Able to rate subjective pain? yes  -KG    Pre-Treatment Pain Level 1  -KG    Posture/Observations    Posture/Observations Comments No acute distress. Mild LUE guarding noted. Rounded shoulders posture, fair postural awareness. L shoulder slightly forward compared to right.  -KG    Special Tests/Palpation    Special Tests/Palpation Shoulder  -KG    Left Shoulder    Flexion AROM Deficit 105  -KG    Flexion PROM Deficit 151  -KG    ABduction AROM Deficit 80  -KG    ABduction PROM Deficit 97  -KG    External Rotation AROM Deficit 10  -KG     External Rotation PROM Deficit 13 at 45 abd  -KG    Internal Rotation AROM Deficit Fucntional reach to S1  -KG    Internal Rotation PROM Deficit 61 at 45 abd  -KG    ROM Impairment Detail Pt guarded with PROM. Fair tolerance to PROM this date.  -KG    MMT (Manual Muscle Testing)    General MMT Assessment Detail L shoulder MMT deferred due to acute manipulation status/lack of AROM. R shoulder MMT grossly 5/5 in all planes; R elbow flex/ext MMT 5/5  -KG      User Key  (r) = Recorded By, (t) = Taken By, (c) = Cosigned By    Initials Name Provider Type    KG Crista Rivas, PT Physical Therapist                      Therapy Education  Education Details: POC education. Ice education. HEP: pendulums, pulleys flex/scap, AA shoulder flex/abd/ER, tables slides, post shoulder stretch  Given: HEP, Symptoms/condition management, Pain management, Posture/body mechanics  Program: New  How Provided: Verbal, Demonstration, Written  Provided to: Patient  Level of Understanding: Teach back education performed, Verbalized, Demonstrated           PT OP Goals       01/31/18 1400       PT Short Term Goals    STG Date to Achieve 02/14/18  -KG     STG 1 Independent/compliant with HEP  -KG     STG 1 Progress New  -KG     STG 2 Tolerate 45 min treatment session without increased pain  -KG     STG 2 Progress New  -KG     STG 3 Demosntrate L shoulder flex PROM to 160 deg or greater  -KG     STG 3 Progress New  -KG     STG 4 Demonstrate L shoulder abd PROM to 150 deg or greater  -KG     STG 4 Progress New  -KG     STG 5 Demonstrate L shoulder ER PROM to 55 deg or greater  -KG     STG 5 Progress New  -KG     Long Term Goals    LTG Date to Achieve 02/28/18  -KG     LTG 1 Subjectively report 60% improvement or greater  -KG     LTG 1 Progress New  -KG     LTG 2 QuickDASH score to 30% or less  -KG     LTG 2 Progress New  -KG     LTG 3 Demonstrate L shoulder flex AROM to 150 deg or greater  -KG     LTG 3 Progress New  -KG     LTG 4 Demonstrate L  shoulder abd AROM to 145 deg or greater  -KG     LTG 4 Progress New  -KG     LTG 5 Demonstrate L shoulder flex/abd MMT to 4+/5  -KG     LTG 5 Progress New  -KG     LTG 6 Demonstrate L shoulder IR/ER MMT to 4+/5  -KG     LTG 6 Progress New  -KG     LTG 7 Demonstrate L shoulder ER AROM to 50 deg or greater  -KG     LTG 7 Progress New  -KG     LTG 8 Demonstrate L IR functional reach to L4  -KG     LTG 8 Progress New  -KG     Time Calculation    PT Goal Re-Cert Due Date 02/21/18  -KG       User Key  (r) = Recorded By, (t) = Taken By, (c) = Cosigned By    Initials Name Provider Type    KG Crista Rivas, PT Physical Therapist                PT Assessment/Plan       01/31/18 1400       PT Assessment    Functional Limitations Limitation in home management;Limitations in community activities;Performance in leisure activities;Performance in self-care ADL;Performance in work activities  -KG     Impairments Impaired flexibility;Joint mobility;Muscle strength;Pain;Posture;Range of motion  -KG     Assessment Comments Pt presents s/p L shoulder manipulation on 1/30/18. Pt demonstrates on overall decrease in shoulder ROM & strength, limiting performance of ADL's & functional mobility at this time. Pt guarded with PROM this date. Fair tolerance to PROM noted as pt has pain throughout total ranges. Pt more limited in shoulder ER/abd at this time. Pt will benefit from skilled PT serivces to address these deficits for improvements in overall functional strength/mobility & to faciliate return to PLOF.  -KG     Rehab Potential Good  -KG     Patient/caregiver participated in establishment of treatment plan and goals Yes  -KG     Patient would benefit from skilled therapy intervention Yes  -KG     PT Plan    PT Frequency 3x/week  -KG     Predicted Duration of Therapy Intervention (days/wks) 4 weeks  -KG     Planned CPT's? PT EVAL LOW COMPLEXITY: 06098;PT RE-EVAL: 54422;PT THER PROC EA 15 MIN: 80650;PT THER ACT EA 15 MIN: 46212;PT MANUAL  THERAPY EA 15 MIN: 36471;PT NEUROMUSC RE-EDUCATION EA 15 MIN: 08547;PT SELF CARE/HOME MGMT/TRAIN EA 15: 97612;PT ELECTRICAL STIM UNATTEND: ;PT THER SUPP EA 15 MIN  -KG     Physical Therapy Interventions (Optional Details) home exercise program;joint mobilization;manual therapy techniques;modalities;neuromuscular re-education;patient/family education;postural re-education;ROM (Range of Motion);strengthening;stretching  -KG     PT Plan Comments Follow MD protocol in chart for manipulation.  -KG       User Key  (r) = Recorded By, (t) = Taken By, (c) = Cosigned By    Initials Name Provider Type    ASHLYN Rivas, PT Physical Therapist                Modalities       01/31/18 1400          Ice    Ice Applied Yes  -KG      Location L Shoulder  -KG      Rx Minutes 15 mins  -KG      Ice S/P Rx Yes  -KG      ELECTRICAL STIMULATION    Attended/Unattended Unattended  -KG      Stimulation Type IFC  -KG      Location/Electrode Placement/Other L Shoulder  -KG      Rx Minutes 15 mins  -KG        User Key  (r) = Recorded By, (t) = Taken By, (c) = Cosigned By    Initials Name Provider Type    ASHLYN Rivas, PT Physical Therapist              Exercises       01/31/18 1400          Subjective Comments    Subjective Comments Refer to therapy pt history for details.  -KG      Subjective Pain    Able to rate subjective pain? yes  -KG      Pre-Treatment Pain Level 1  -KG      Post-Treatment Pain Level 0  -KG      Aquatics    Aquatics performed? No  -KG      Exercise 1    Exercise Name 1 Supine AA flex with wand  -KG      Sets 1 1  -KG      Reps 1 5  -KG      Exercise 2    Exercise Name 2 Supine AA shoulder ER  -KG      Sets 2 1  -KG      Reps 2 5  -KG      Exercise 3    Exercise Name 3 Standing AA shoulder abd  -KG      Sets 3 1  -KG      Reps 3 5  -KG      Exercise 4    Exercise Name 4 Pulleys flex/scap  -KG      Sets 4 1  -KG      Reps 4 5  -KG      Exercise 5    Exercise Name 5 Post shoulder stretch  -KG      Reps 5 1   -KG      Time (Seconds) 5 30  -KG        User Key  (r) = Recorded By, (t) = Taken By, (c) = Cosigned By    Initials Name Provider Type    ASHLYN Rivas PT Physical Therapist           Manual Rx (last 36 hours)      Manual Treatments       01/31/18 1400          Manual Rx 1    Manual Rx 1 Location L shoulder  -KG      Manual Rx 1 Type PROM flex, abd, ER/IR at 45 deg abd  -KG      Manual Rx 1 Grade Gentle  -KG      Manual Rx 1 Duration 10 min  -KG        User Key  (r) = Recorded By, (t) = Taken By, (c) = Cosigned By    Initials Name Provider Type    ASHLYN Crista Rivas PT Physical Therapist                      Outcome Measure Options: Quick DASH  Quick DASH  Open a tight or new jar.: Severe Difficulty  Do heavy household chores (e.g., wash walls, wash floors): Moderate Difficulty  Carry a shopping bag or briefcase: No Difficulty  Wash your back: Unable  Use a knife to cut food: Moderate Difficulty  Recreational activities in which you take some force or impact through your arm, should or hand (e.g. golf, hammering, tennis, etc.): Unable  During the past week, to what extent has your arm, shoulder, or hand problem interfered with your normal social activites with family, friends, neighbors or groups?: Quite a bit  During the past week, were you limited in your work or other regular daily activities as a result of your arm, shoulder or hand problem?: Very limited  Arm, Shoulder, or hand pain: Severe  Tingling (pins and needles) in your arm, shoulder, or hand: Moderate  During the past week, how much difficulty have you had sleeping because of the pain in your arm, shoulder or hand?: Severe Difficulty  Number of Questions Answered: 11  Quick DASH Score: 65.91         Time Calculation:   Start Time: 1436  Stop Time: 1540  Time Calculation (min): 64 min  Total Timed Code Minutes- PT: 34 minute(s)     Therapy Charges for Today     Code Description Service Date Service Provider Modifiers Qty    76123295107  PT  EVAL LOW COMPLEXITY 1 1/31/2018 Crista Rivas, PT GP 1    71567544495 HC PT ELECTRICAL STIM UNATTENDED 1/31/2018 Crista Rivas, PT  1    11084176921 HC PT THER SUPP EA 15 MIN 1/31/2018 Crista Rivas, PT GP 1    73681087333 HC PT THER PROC EA 15 MIN 1/31/2018 Crista Rivas, PT GP 1    95582358150 HC PT MANUAL THERAPY EA 15 MIN 1/31/2018 Crista Rivas, PT GP 1          PT G-Codes  Outcome Measure Options: Quick DASH         Crista Rivas, PT  2/1/2018

## 2018-02-02 ENCOUNTER — HOSPITAL ENCOUNTER (OUTPATIENT)
Dept: PHYSICAL THERAPY | Facility: HOSPITAL | Age: 53
Setting detail: THERAPIES SERIES
Discharge: HOME | End: 2018-02-02

## 2018-02-02 DIAGNOSIS — M75.02 ADHESIVE CAPSULITIS OF LEFT SHOULDER: Primary | ICD-10-CM

## 2018-02-02 PROCEDURE — 97140 MANUAL THERAPY 1/> REGIONS: CPT

## 2018-02-02 PROCEDURE — G0283 ELEC STIM OTHER THAN WOUND: HCPCS

## 2018-02-02 PROCEDURE — 97110 THERAPEUTIC EXERCISES: CPT

## 2018-02-02 NOTE — THERAPY TREATMENT NOTE
"    Outpatient Physical Therapy Ortho Treatment Note  [facility]  Sabi Berg, PTA  18  10:32 AM       Patient Name: Patricia Palma  : 1965  MRN: [id number]  Today's Date: 2018      Visit Date: 2018    Subjective Improvement: N/A      Attendance:   Approved: 24 left for year           MD follow up: TBD           RC date: 18         Visit Dx:    ICD-10-CM ICD-9-CM   1. Adhesive capsulitis of left shoulder M75.02 726.0       Patient Active Problem List   Diagnosis   • Lower abdominal pain   • Change in stool caliber   • Diverticulosis   • Diverticulosis of large intestine without hemorrhage   • Adhesive capsulitis of left shoulder        Past Medical History:   Diagnosis Date   • Diverticulosis    • Shoulder pain, left         Past Surgical History:   Procedure Laterality Date   • APPENDECTOMY     •  SECTION      x2   • COLONOSCOPY      10 years   • SHOULDER MANIPULATION Left 2018             PT Ortho       18 0900    Subjective Comments    Subjective Comments pt states that she was very sore after PT last time.  -ALIZE    Precautions and Contraindications    Precautions/Limitations no known precautions/limitations  -ALIZE    Contraindications s/p L shoulder manipulation 18  -ALIZE    Subjective Pain    Able to rate subjective pain? yes  -ALIZE    Pre-Treatment Pain Level 2   \"nag\"  -ALIZE    Post-Treatment Pain Level 2  -ALIZE    Posture/Observations    Posture/Observations Comments No acute distress. Mild LUE guarding noted. Rounded shoulders posture, fair postural awareness. L shoulder slightly forward compared to right.  -ALIZE    Left Shoulder    Flexion PROM Deficit 159  -ALIZE    ABduction PROM Deficit 162  -ALIZE      18 1400    Subjective Comments    Subjective Comments Refer to therapy pt history for details.  -KG    Precautions and Contraindications    Precautions/Limitations no known precautions/limitations  -KG    Contraindications s/p L shoulder " manipulation 1/30/18  -KG    Subjective Pain    Able to rate subjective pain? yes  -KG    Pre-Treatment Pain Level 1  -KG    Posture/Observations    Posture/Observations Comments No acute distress. Mild LUE guarding noted. Rounded shoulders posture, fair postural awareness. L shoulder slightly forward compared to right.  -KG    Special Tests/Palpation    Special Tests/Palpation Shoulder  -KG    Left Shoulder    Flexion AROM Deficit 105  -KG    Flexion PROM Deficit 151  -KG    ABduction AROM Deficit 80  -KG    ABduction PROM Deficit 97  -KG    External Rotation AROM Deficit 10  -KG    External Rotation PROM Deficit 13 at 45 abd  -KG    Internal Rotation AROM Deficit Fucntional reach to S1  -KG    Internal Rotation PROM Deficit 61 at 45 abd  -KG    ROM Impairment Detail Pt guarded with PROM. Fair tolerance to PROM this date.  -KG    MMT (Manual Muscle Testing)    General MMT Assessment Detail L shoulder MMT deferred due to acute manipulation status/lack of AROM. R shoulder MMT grossly 5/5 in all planes; R elbow flex/ext MMT 5/5  -KG      User Key  (r) = Recorded By, (t) = Taken By, (c) = Cosigned By    Initials Name Provider Type    KG Crista Rivas, PT Physical Therapist    ALIZE Berg PTA Physical Therapy Assistant                            PT Assessment/Plan       02/02/18 0900       PT Assessment    Functional Limitations Limitation in home management;Limitations in community activities;Performance in leisure activities;Performance in self-care ADL;Performance in work activities  -     Impairments Impaired flexibility;Joint mobility;Muscle strength;Pain;Posture;Range of motion  -     Assessment Comments pt able to complete therex fairly well. pt only able to do limited reps and requires increase amount of time to complete due to increase nausea/pain. pt complaint with HEP and is able to verbalize and demo.  -     Rehab Potential Good  -     Patient/caregiver participated in establishment of  "treatment plan and goals Yes  -ALIZE     Patient would benefit from skilled therapy intervention Yes  -ALIZE     PT Plan    PT Frequency 3x/week  -ALIZE     Predicted Duration of Therapy Intervention (days/wks) 4 weeks  -ALIZE     PT Plan Comments Cont per MD protocol  -ALIZE       User Key  (r) = Recorded By, (t) = Taken By, (c) = Cosigned By    Initials Name Provider Type    ALIZE Berg PTA Physical Therapy Assistant                Modalities       02/02/18 0900          Ice    Ice Applied Yes  -ALIZE      Location L Shoulder  -ALIZE      Rx Minutes 15 mins  -ALIZE      Ice S/P Rx Yes  -ALIZE      ELECTRICAL STIMULATION    Attended/Unattended Unattended  -ALIZE      Stimulation Type IFC  -ALIZE      Location/Electrode Placement/Other L Shoulder  -ALIZE      Rx Minutes 15 mins  -ALIZE        User Key  (r) = Recorded By, (t) = Taken By, (c) = Cosigned By    Initials Name Provider Type    ALIZE Berg PTA Physical Therapy Assistant                Exercises       02/02/18 0900          Subjective Comments    Subjective Comments pt states that she was very sore after PT last time.  -ALIZE      Subjective Pain    Able to rate subjective pain? yes  -ALIZE      Pre-Treatment Pain Level 2   \"nag\"  -ALIZE      Post-Treatment Pain Level 2  -ALIZE      Aquatics    Aquatics performed? No  -ALIZE      Exercise 1    Exercise Name 1 Pulleys FF/abd  -ALIZE      Time (Minutes) 1 2 min each  -ALIZE      Exercise 2    Exercise Name 2 Pendulums 4 way  -ALIZE      Time (Minutes) 2 1 min each  -ALIZE      Exercise 3    Exercise Name 3 Post shoulder S  -ALIZE      Reps 3 3  -ALIZE      Time (Seconds) 3 30 sec hold  -ALIZE      Exercise 4    Exercise Name 4 ST AA wand abd  -ALIZE      Sets 4 1  -ALIZE      Reps 4 8  -ALIZE      Exercise 5    Exercise Name 5 Supine AA wand FF  -ALIZE      Sets 5 1  -ALIZE      Reps 5 8  -ALIZE      Exercise 6    Exercise Name 6 PROM all directions  -ALIZE      Time (Minutes) 6 15 mi n  -ALIZE        User Key  (r) = Recorded By, (t) = Taken By, (c) = Cosigned By    Initials Name " Provider Type    ALIZE Berg PTA Physical Therapy Assistant                        Manual Rx (last 36 hours)      Manual Treatments       02/02/18 0900          Manual Rx 1    Manual Rx 1 Location L shoulder  -      Manual Rx 1 Type PROM flex, abd, ER/IR at 45 deg abd  -      Manual Rx 1 Grade Gentle  -      Manual Rx 1 Duration 15  -ALIZE        User Key  (r) = Recorded By, (t) = Taken By, (c) = Cosigned By    Initials Name Provider Type    ALIZE Berg PTA Physical Therapy Assistant                PT OP Goals       02/02/18 0900       PT Short Term Goals    STG Date to Achieve 02/14/18  -     STG 1 Independent/compliant with HEP  -     STG 1 Progress Ongoing  -     STG 2 Tolerate 45 min treatment session without increased pain  -     STG 2 Progress Ongoing  -     STG 3 Demosntrate L shoulder flex PROM to 160 deg or greater  -     STG 3 Progress Progressing  -     STG 4 Demonstrate L shoulder abd PROM to 150 deg or greater  -     STG 4 Progress Met  -     STG 5 Demonstrate L shoulder ER PROM to 55 deg or greater  -     STG 5 Progress Ongoing  -     Long Term Goals    LTG Date to Achieve 02/28/18  -     LTG 1 Subjectively report 60% improvement or greater  -     LTG 1 Progress New  -     LTG 2 QuickDASH score to 30% or less  -     LTG 2 Progress New  [facility]     LTG 3 Demonstrate L shoulder flex AROM to 150 deg or greater  -     LTG 3 Progress New  [facility]     LTG 4 Demonstrate L shoulder abd AROM to 145 deg or greater  -     LTG 4 Progress New  [facility]     LTG 5 Demonstrate L shoulder flex/abd MMT to 4+/5  -     LTG 5 Progress New  [facility]     LTG 6 Demonstrate L shoulder IR/ER MMT to 4+/5  -     LTG 6 Progress New  [facility]     LTG 7 Demonstrate L shoulder ER AROM to 50 deg or greater  -     LTG 7 Progress New  [facility]     LTG 8 Demonstrate L IR functional reach to L4  -     LTG 8 Progress [facility]     Time Calculation    PT Goal Re-Cert Due Date 02/21/18  -       User  Key  (r) = Recorded By, (t) = Taken By, (c) = Cosigned By    Initials Name Provider Type    ALIZE Berg PTA Physical Therapy Assistant          Therapy Education  Given: HEP, Symptoms/condition management, Pain management, Posture/body mechanics  Program: Reinforced  How Provided: Verbal, Demonstration, Written  Provided to: Patient  Level of Understanding: Teach back education performed, Verbalized, Demonstrated              Time Calculation:   Start Time: 0930  Stop Time: 1038  Time Calculation (min): 68 min  Total Timed Code Minutes- PT: 53 minute(s)    Therapy Charges for Today     Code Description Service Date Service Provider Modifiers Qty    31306900137 HC PT THER PROC EA 15 MIN 2/2/2018 Sabi Berg, FERNANDO GP 3    39883770168 HC PT MANUAL THERAPY EA 15 MIN 2/2/2018 Sabi Berg, PTA GP 1    37175503704 HC PT ELECTRICAL STIM UNATTENDED 2/2/2018 Sabi Berg, PTA  1    46303397901 HC PT THER SUPP EA 15 MIN 2/2/2018 Sabi Berg, FERNANDO GP 1                    Sabi Berg PTA  2/2/2018

## 2018-02-05 ENCOUNTER — HOSPITAL ENCOUNTER (OUTPATIENT)
Dept: PHYSICAL THERAPY | Facility: HOSPITAL | Age: 53
Setting detail: THERAPIES SERIES
Discharge: HOME | End: 2018-02-05

## 2018-02-05 DIAGNOSIS — M75.02 ADHESIVE CAPSULITIS OF LEFT SHOULDER: Primary | ICD-10-CM

## 2018-02-05 DIAGNOSIS — M25.512 LEFT SHOULDER PAIN, UNSPECIFIED CHRONICITY: ICD-10-CM

## 2018-02-05 PROCEDURE — G0283 ELEC STIM OTHER THAN WOUND: HCPCS

## 2018-02-05 PROCEDURE — 97110 THERAPEUTIC EXERCISES: CPT

## 2018-02-05 NOTE — THERAPY TREATMENT NOTE
Outpatient Physical Therapy Ortho Treatment Note  [facility]  Vianca Yin PTA       Patient Name: Patricia Palma  : 1965  MRN: [id number]  Today's Date: 2018      Visit Date: 2018     Visits: 3/3  Insurance Visits Approved: 24 visits  Recert Due: 2018  MD Appt: TBD  Pain: pretreatment 2/10; post treatment 2/10  Improvement: pt is subjectively reporting 0% improvement since initial evaluation    Visit Dx:    ICD-10-CM ICD-9-CM   1. Adhesive capsulitis of left shoulder M75.02 726.0   2. Left shoulder pain, unspecified chronicity M25.512 719.41       Patient Active Problem List   Diagnosis   • Lower abdominal pain   • Change in stool caliber   • Diverticulosis   • Diverticulosis of large intestine without hemorrhage   • Adhesive capsulitis of left shoulder        Past Medical History:   Diagnosis Date   • Diverticulosis    • Shoulder pain, left         Past Surgical History:   Procedure Laterality Date   • APPENDECTOMY     •  SECTION      x2   • COLONOSCOPY      10 years   • SHOULDER MANIPULATION Left 2018             PT Ortho       18 1000    Precautions and Contraindications    Precautions/Limitations no known precautions/limitations  -    Contraindications s/p L shoulder manipulation 18  -    Subjective Pain    Post-Treatment Pain Level 2  -    Posture/Observations    Posture/Observations Comments guarding with PROM  -      User Key  (r) = Recorded By, (t) = Taken By, (c) = Cosigned By    Initials Name Provider Type     Vianca Yin PTA Physical Therapy Assistant                            PT Assessment/Plan       18 1000       PT Assessment    Assessment Comments patient is more guarded with PROM and demonstrates visually more motion with AROM supine and sidelying. patient very hesitant to allow clinician to move shoulder and stops secondary to pain. has an empty endfeel.   -     PT Plan    PT Frequency 3x/week  -     PT  Plan Comments continue per Protocol and POC, may add wall walks flexion next visit  -       User Key  (r) = Recorded By, (t) = Taken By, (c) = Cosigned By    Initials Name Provider Type     Vianca Yin PTA Physical Therapy Assistant                Modalities       02/05/18 1000          Ice    Ice Applied Yes  -MH      Location L Shoulder  -MH      Rx Minutes --   20 minutes  -      Ice S/P Rx Yes  -      ELECTRICAL STIMULATION    Attended/Unattended Unattended  -      Location/Electrode Placement/Other L Shoulder  -MH      Rx Minutes 20 mins  -        User Key  (r) = Recorded By, (t) = Taken By, (c) = Cosigned By    Initials Name Provider Type     Vianca DIANE Yin PTA Physical Therapy Assistant                Exercises       02/05/18 1000          Subjective Comments    Subjective Comments states that she has been doing her exercises this morning. 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 yes  -      Pre-Treatment Pain Level 2  -      Post-Treatment Pain Level 2  -      Aquatics    Aquatics performed? No  -      Exercise 1    Exercise Name 1 Supine AA Wand Flexion  -MH      Reps 1 20  -MH      Exercise 2    Exercise Name 2 St Wand Abd  -MH      Reps 2 20  -MH      Exercise 3    Exercise Name 3 Pendulums flex/Abd  -MH      Reps 3 2  -MH      Time (Minutes) 3 1 minute  -MH      Exercise 4    Exercise Name 4 Doorway S Mid  -MH      Reps 4 2  -MH      Time (Seconds) 4 30 sec hold  -MH      Exercise 5    Exercise Name 5 IR S with Strap  -MH      Reps 5 2  -MH      Time (Minutes) 5 1 minute hold  -MH      Exercise 6    Exercise Name 6 Posterior Shoulder S  -MH      Reps 6 2  -MH      Time (Minutes) 6 30 sec hold  -MH      Exercise 7    Exercise Name 7 Supine AROM Flexion  -MH      Reps 7 20  -MH      Exercise 8    Exercise Name 8 Sidelying AROM ABD  -MH      Reps 8 20  -MH      Exercise 9    Exercise Name 9 Sidelying AROM ER  -MH      Reps  9 20  -MH      Exercise 10    Exercise Name 10 see manual Tab for PROM  -MH        User Key  (r) = Recorded By, (t) = Taken By, (c) = Cosigned By    Initials Name Provider Type    SACHA CONTRERAS FERNANDO Yin Physical Therapy Assistant                        Manual Rx (last 36 hours)      Manual Treatments       02/05/18 0900          Manual Rx 1    Manual Rx 1 Location L Shoulder  -MH      Manual Rx 1 Type PROM all planes, GHJ mobes   -MH      Manual Rx 1 Duration 15 minutes  -        User Key  (r) = Recorded By, (t) = Taken By, (c) = Cosigned By    Initials Name Provider Type    SACHA CONTRERAS FERNANDO Yin Physical Therapy Assistant                PT OP Goals       02/05/18 1000       PT Short Term Goals    STG Date to Achieve 02/14/18  -     STG 1 Independent/compliant with HEP  -MH     STG 1 Progress Ongoing  -MH     STG 2 Tolerate 45 min treatment session without increased pain  -     STG 2 Progress Ongoing  -MH     STG 3 Demosntrate L shoulder flex PROM to 160 deg or greater  -     STG 3 Progress Progressing  -MH     STG 4 Demonstrate L shoulder abd PROM to 150 deg or greater  -     STG 4 Progress Ongoing  -     STG 5 Demonstrate L shoulder ER PROM to 55 deg or greater  -     STG 5 Progress Ongoing  -     Long Term Goals    LTG Date to Achieve 02/28/18  -     LTG 1 Subjectively report 60% improvement or greater  -     LTG 1 Progress New  -MH     LTG 2 QuickDASH score to 30% or less  -     LTG 2 Progress New  -     LTG 3 Demonstrate L shoulder flex AROM to 150 deg or greater  -     LTG 3 Progress New  -MH     LTG 4 Demonstrate L shoulder abd AROM to 145 deg or greater  -     LTG 4 Progress New  -MH     LTG 5 Demonstrate L shoulder flex/abd MMT to 4+/5  -MH     LTG 5 Progress New  -MH     LTG 6 Demonstrate L shoulder IR/ER MMT to 4+/5  -MH     LTG 6 Progress New  -MH     LTG 7 Demonstrate L shoulder ER AROM to 50 deg or greater  -     LTG 7 Progress New  -     LTG 8 Demonstrate L IR functional  reach to L4  -MH     LTG 8 Progress New  -     Time Calculation    PT Goal Re-Cert Due Date 02/21/18  -       User Key  (r) = Recorded By, (t) = Taken By, (c) = Cosigned By    Initials Name Provider Type     Vianca Yin PTA Physical Therapy Assistant          Therapy Education  Given: HEP, Symptoms/condition management, Pain management, Posture/body mechanics  Program: Reinforced  How Provided: Verbal, Demonstration  Provided to: Patient  Level of Understanding: Verbalized, Demonstrated              Time Calculation:   Start Time: 1015  Stop Time: 1120  Time Calculation (min): 65 min  Total Timed Code Minutes- PT: 45 minute(s)    Therapy Charges for Today     Code Description Service Date Service Provider Modifiers Qty    45923568078 HC PT THER PROC EA 15 MIN 2/5/2018 Vianca Yin, FERNANDO GP 3    64484391217 HC PT THER SUPP EA 15 MIN 2/5/2018 Vianca Yin, PTA GP 1    65668145344 HC PT ELECTRICAL STIM UNATTENDED 2/5/2018 Vianca Yin, PTA  1                    Vianca Yin PTA  2/5/2018

## 2018-02-07 ENCOUNTER — HOSPITAL ENCOUNTER (OUTPATIENT)
Dept: PHYSICAL THERAPY | Facility: HOSPITAL | Age: 53
Setting detail: THERAPIES SERIES
Discharge: HOME | End: 2018-02-07

## 2018-02-07 DIAGNOSIS — M75.02 ADHESIVE CAPSULITIS OF LEFT SHOULDER: Primary | ICD-10-CM

## 2018-02-07 DIAGNOSIS — M25.512 LEFT SHOULDER PAIN, UNSPECIFIED CHRONICITY: ICD-10-CM

## 2018-02-07 PROCEDURE — G0283 ELEC STIM OTHER THAN WOUND: HCPCS | Performed by: PHYSICAL THERAPIST

## 2018-02-07 PROCEDURE — 97140 MANUAL THERAPY 1/> REGIONS: CPT | Performed by: PHYSICAL THERAPIST

## 2018-02-07 PROCEDURE — 97110 THERAPEUTIC EXERCISES: CPT | Performed by: PHYSICAL THERAPIST

## 2018-02-07 NOTE — THERAPY TREATMENT NOTE
"    Outpatient Physical Therapy Ortho Treatment Note  [facility]     Patient Name: Patricia Palma  : 1965  MRN: [id number]  Today's Date: 2018      Visit Date: 2018     Subjective Improvement: \"much better\"       Attendance:   Approved:    24 visits       MD follow up:    18        date:     18      Visit Dx:    ICD-10-CM ICD-9-CM   1. Adhesive capsulitis of left shoulder M75.02 726.0   2. Left shoulder pain, unspecified chronicity M25.512 719.41       Patient Active Problem List   Diagnosis   • Lower abdominal pain   • Change in stool caliber   • Diverticulosis   • Diverticulosis of large intestine without hemorrhage   • Adhesive capsulitis of left shoulder        Past Medical History:   Diagnosis Date   • Diverticulosis    • Shoulder pain, left         Past Surgical History:   Procedure Laterality Date   • APPENDECTOMY     •  SECTION      x2   • COLONOSCOPY      10 years   • SHOULDER MANIPULATION Left 2018             PT Ortho       18 0900    Subjective Comments    Subjective Comments Pt states she's already done all of her exercises this morning. Was a little sore after last treatment but feels better today. Took a pain pill before coming. Most limited in ER.  -KG    Precautions and Contraindications    Precautions/Limitations no known precautions/limitations  -KG    Contraindications s/p L shoulder manipulation 18  -KG    Subjective Pain    Post-Treatment Pain Level 1  -KG    Posture/Observations    Posture/Observations Comments No acute distress. Less guarding noted with PROM; able to tolerate end range stretch with less pain.  -KG    Left Shoulder    Flexion PROM Deficit 163  -KG    ABduction PROM Deficit 164  -KG      18 1000    Precautions and Contraindications    Precautions/Limitations no known precautions/limitations  -MH    Contraindications s/p L shoulder manipulation 18  -MH    Subjective Pain    Post-Treatment Pain " Level 2  -    Posture/Observations    Posture/Observations Comments guarding with PROM  -      User Key  (r) = Recorded By, (t) = Taken By, (c) = Cosigned By    Initials Name Provider Type     Vianca Yin, PTA Physical Therapy Assistant    ASHLYN Rivas, PT Physical Therapist                            PT Assessment/Plan       02/07/18 0900       PT Assessment    Functional Limitations Limitation in home management;Limitations in community activities;Performance in leisure activities;Performance in self-care ADL;Performance in work activities  -KG     Impairments Impaired flexibility;Joint mobility;Muscle strength;Pain;Posture;Range of motion  -KG     Assessment Comments Pt with improved tolerance to PROM this date. Pt less guarded and was able to tolerate end range stretching this date with less pain. No reports of feeling nauseous today.  -KG     Rehab Potential Good  -KG     Patient/caregiver participated in establishment of treatment plan and goals Yes  -KG     Patient would benefit from skilled therapy intervention Yes  -KG     PT Plan    PT Frequency 3x/week  -KG     Predicted Duration of Therapy Intervention (days/wks) 4 weeks  -KG     PT Plan Comments MD note next visit; continue per protocol.  -KG       User Key  (r) = Recorded By, (t) = Taken By, (c) = Cosigned By    Initials Name Provider Type    ASHLYN Rivas, PT Physical Therapist                Modalities       02/07/18 0900          Ice    Ice Applied Yes  -KG      Location L Shoulder  -KG      Rx Minutes 15 mins  -KG      Ice S/P Rx Yes  -KG      ELECTRICAL STIMULATION    Attended/Unattended Unattended  -KG      Stimulation Type IFC  -KG      Location/Electrode Placement/Other L Shoulder  -KG      Rx Minutes 15 mins  -KG        User Key  (r) = Recorded By, (t) = Taken By, (c) = Cosigned By    Initials Name Provider Type    ASHLYN Rivas PT Physical Therapist                Exercises       02/07/18 0900          Subjective  Comments    Subjective Comments Pt states she's already done all of her exercises this morning. Was a little sore after last treatment but feels better today. Took a pain pill before coming. Most limited in ER.  -KG      Subjective Pain    Able to rate subjective pain? yes  -KG      Pre-Treatment Pain Level 2  -KG      Post-Treatment Pain Level 1  -KG      Aquatics    Aquatics performed? No  -KG      Exercise 1    Exercise Name 1 Pro II for ROM/endurance  -KG      Time (Minutes) 1 10 min  -KG      Additional Comments L 1.0  -KG      Exercise 2    Exercise Name 2 St Wand Abd  -KG      Sets 2 1  -KG      Reps 2 20  -KG      Exercise 3    Exercise Name 3 Pulleys abd  -KG      Sets 3 1  -KG      Reps 3 20  -KG      Exercise 4    Exercise Name 4 Wall slides Flex  -KG      Sets 4 2  -KG      Reps 4 10  -KG      Exercise 5    Exercise Name 5 Doorway stretch Mid  -KG      Reps 5 2  -KG      Time (Seconds) 5 30  -KG      Exercise 6    Exercise Name 6 ER doorway stretch  -KG      Reps 6 2  -KG      Time (Seconds) 6 30  -KG      Exercise 7    Exercise Name 7 IR S with Strap  -KG      Reps 7 2  -KG      Time (Seconds) 7 30  -KG      Exercise 8    Exercise Name 8 Supine ER stretch; hands behind head  -KG      Reps 8 3  -KG      Time (Seconds) 8 30  -KG      Exercise 9    Exercise Name 9 Sidelying AROM ABD  -KG      Sets 9 2  -KG      Time (Minutes) 9 10  -KG      Exercise 10    Exercise Name 10 Sidelying AROM ER  -KG      Sets 10 2  -KG      Time (Minutes) 10 10  -KG      Exercise 11    Exercise Name 11 Supine AA flexion  -KG      Sets 11 2  -KG      Reps 11 10  -KG      Additional Comments 3# bar  -KG        User Key  (r) = Recorded By, (t) = Taken By, (c) = Cosigned By    Initials Name Provider Type    KG Crista Rivas, KEVIN Physical Therapist                        Manual Rx (last 36 hours)      Manual Treatments       02/07/18 0900          Manual Rx 1    Manual Rx 1 Location L Shoulder  -KG      Manual Rx 1 Type PROM all  planes, GHJ mobes   -KG      Manual Rx 1 Duration 12 min  -KG        User Key  (r) = Recorded By, (t) = Taken By, (c) = Cosigned By    Initials Name Provider Type    ASHLYN Rivas, KEVIN Physical Therapist                PT OP Goals       02/07/18 0900       PT Short Term Goals    STG Date to Achieve 02/14/18  -KG     STG 1 Independent/compliant with HEP  -KG     STG 1 Progress Ongoing  -KG     STG 2 Tolerate 45 min treatment session without increased pain  -KG     STG 2 Progress Partially Met  -KG     STG 3 Demosntrate L shoulder flex PROM to 160 deg or greater  -KG     STG 3 Progress Progressing  -KG     STG 4 Demonstrate L shoulder abd PROM to 150 deg or greater  -KG     STG 4 Progress Ongoing  -KG     STG 5 Demonstrate L shoulder ER PROM to 55 deg or greater  -KG     STG 5 Progress Ongoing  -KG     Long Term Goals    LTG Date to Achieve 02/28/18  -KG     LTG 1 Subjectively report 60% improvement or greater  -KG     LTG 1 Progress Progressing  -KG     LTG 2 QuickDASH score to 30% or less  -KG     LTG 2 Progress Progressing  -KG     LTG 3 Demonstrate L shoulder flex AROM to 150 deg or greater  -KG     LTG 3 Progress Progressing  -KG     LTG 4 Demonstrate L shoulder abd AROM to 145 deg or greater  -KG     LTG 4 Progress Progressing  -KG     LTG 5 Demonstrate L shoulder flex/abd MMT to 4+/5  -KG     LTG 5 Progress Progressing  -KG     LTG 6 Demonstrate L shoulder IR/ER MMT to 4+/5  -KG     LTG 6 Progress Progressing  -KG     LTG 7 Demonstrate L shoulder ER AROM to 50 deg or greater  -KG     LTG 7 Progress Progressing  -KG     LTG 8 Demonstrate L IR functional reach to L4  -KG     LTG 8 Progress Progressing  -KG     Time Calculation    PT Goal Re-Cert Due Date 02/21/18  -KG       User Key  (r) = Recorded By, (t) = Taken By, (c) = Cosigned By    Initials Name Provider Type    ASHLYN Rivas PT Physical Therapist          Therapy Education  Education Details: Added supine ER stretch with arms behind  head  Given: HEP, Symptoms/condition management, Posture/body mechanics, Pain management  Program: Progressed  How Provided: Verbal  Provided to: Patient  Level of Understanding: Verbalized, Demonstrated              Time Calculation:   Start Time: 0930  Stop Time: 1040  Time Calculation (min): 70 min  Total Timed Code Minutes- PT: 55 minute(s)    Therapy Charges for Today     Code Description Service Date Service Provider Modifiers Qty    96609296218 HC PT THER PROC EA 15 MIN 2/7/2018 Crista Rivas, PT GP 3    87580990932 HC PT MANUAL THERAPY EA 15 MIN 2/7/2018 Crista Rivas, PT GP 1    73490004659 HC PT ELECTRICAL STIM UNATTENDED 2/7/2018 Crista Rivas, PT  1    [id number] HC PT THER SUPP EA 15 MIN 2/7/2018 Crista Rivas, PT GP 1                    Crista Rivas, PT  2/7/2018

## 2018-02-08 ENCOUNTER — HOSPITAL ENCOUNTER (OUTPATIENT)
Dept: PHYSICAL THERAPY | Facility: HOSPITAL | Age: 53
Setting detail: THERAPIES SERIES
Discharge: HOME | End: 2018-02-08

## 2018-02-08 DIAGNOSIS — M75.02 ADHESIVE CAPSULITIS OF LEFT SHOULDER: Primary | ICD-10-CM

## 2018-02-08 DIAGNOSIS — M25.512 LEFT SHOULDER PAIN, UNSPECIFIED CHRONICITY: ICD-10-CM

## 2018-02-08 PROCEDURE — 97140 MANUAL THERAPY 1/> REGIONS: CPT

## 2018-02-08 PROCEDURE — 97110 THERAPEUTIC EXERCISES: CPT

## 2018-02-08 PROCEDURE — G0283 ELEC STIM OTHER THAN WOUND: HCPCS

## 2018-02-08 NOTE — THERAPY TREATMENT NOTE
"    Outpatient Physical Therapy Ortho Treatment Note  [facility]     Patient Name: Patricia Palma  : 1965  MRN: [id number]  Today's Date: 2018      Subjective Improvement: \"much better\"       Attendance:   Approved:    24 visits       MD follow up:    18        date:     18    Visit Date: 2018    Visit Dx:    ICD-10-CM ICD-9-CM   1. Adhesive capsulitis of left shoulder M75.02 726.0   2. Left shoulder pain, unspecified chronicity M25.512 719.41       Patient Active Problem List   Diagnosis   • Lower abdominal pain   • Change in stool caliber   • Diverticulosis   • Diverticulosis of large intestine without hemorrhage   • Adhesive capsulitis of left shoulder        Past Medical History:   Diagnosis Date   • Diverticulosis    • Shoulder pain, left         Past Surgical History:   Procedure Laterality Date   • APPENDECTOMY     •  SECTION      x2   • COLONOSCOPY      10 years   • SHOULDER MANIPULATION Left 2018             PT Ortho       18 0900    Precautions and Contraindications    Precautions/Limitations no known precautions/limitations  -NH    Contraindications s/p L shoulder manipulation 18  -NH    Left Shoulder    Flexion AROM Deficit 162  -NH    Flexion PROM Deficit 165  -NH    ABduction AROM Deficit 158  -NH    ABduction PROM Deficit 158  -NH    External Rotation AROM Deficit 40; T1 functional reach  -NH    External Rotation PROM Deficit 45  -NH    Internal Rotation AROM Deficit L5 Functional reach  -NH    Internal Rotation PROM Deficit 70  -NH      18 0900    Subjective Comments    Subjective Comments Pt states she's already done all of her exercises this morning. Was a little sore after last treatment but feels better today. Took a pain pill before coming. Most limited in ER.  -KG    Precautions and Contraindications    Precautions/Limitations no known precautions/limitations  -KG    Contraindications s/p L shoulder manipulation " 1/30/18  -KG    Subjective Pain    Post-Treatment Pain Level 1  -KG    Posture/Observations    Posture/Observations Comments No acute distress. Less guarding noted with PROM; able to tolerate end range stretch with less pain.  -KG    Left Shoulder    Flexion PROM Deficit 163  -KG    ABduction PROM Deficit 164  -KG      User Key  (r) = Recorded By, (t) = Taken By, (c) = Cosigned By    Initials Name Provider Type     Crista Rivas, PT Physical Therapist    NH Jana Moreno, PT Physical Therapist                            PT Assessment/Plan       02/08/18 0900       PT Assessment    Functional Limitations Limitation in home management;Limitations in community activities;Performance in leisure activities;Performance in self-care ADL;Performance in work activities  -NH     Impairments Impaired flexibility;Joint mobility;Muscle strength;Pain;Posture;Range of motion  -NH     Assessment Comments MD note sent with patient. Patient presents with improving ROM. Patient has small aplitude clunk with posterior GH mobs.  -NH     Rehab Potential Good  -NH     Patient/caregiver participated in establishment of treatment plan and goals Yes  -NH     Patient would benefit from skilled therapy intervention Yes  -NH     PT Plan    PT Frequency 3x/week  -NH     Predicted Duration of Therapy Intervention (days/wks) 4 weeks  -NH     PT Plan Comments Advance strengthening.  -NH       User Key  (r) = Recorded By, (t) = Taken By, (c) = Cosigned By    Initials Name Provider Type    NH Jana Moreno, PT Physical Therapist                Modalities       02/08/18 0900          Subjective Pain    Able to rate subjective pain? yes  -NH      Pre-Treatment Pain Level 3  -NH      Ice    Location L Shoulder  -NH      Rx Minutes 15 mins  -NH      Ice S/P Rx Yes  -NH      ELECTRICAL STIMULATION    Attended/Unattended Unattended  -NH      Stimulation Type IFC  -NH      Location/Electrode Placement/Other L Shoulder  -NH      Rx Minutes 15 mins   -NH        User Key  (r) = Recorded By, (t) = Taken By, (c) = Cosigned By    Initials Name Provider Type    NH Jana Moreno, PT Physical Therapist                Exercises       02/08/18 0900          Subjective Comments    Subjective Comments Patient reports doing a lot of exercises with her shoulder already this AM. Reaching out to the side or downwards is the hardest such as reaching into dryer.   -NH      Subjective Pain    Able to rate subjective pain? yes  -NH      Pre-Treatment Pain Level 3  -NH      Aquatics    Aquatics performed? No  -NH      Exercise 1    Exercise Name 1 Pro II for ROM/endurance  -NH      Time (Minutes) 1 10 min  -NH      Additional Comments L3.0  -NH      Exercise 2    Exercise Name 2 St Wand Abd  -NH      Sets 2 1  -NH      Reps 2 20  -NH      Exercise 3    Exercise Name 3 Pulleys abd  -NH      Sets 3 1  -NH      Reps 3 20  -NH      Exercise 4    Exercise Name 4 Wall slides Flex  -NH      Sets 4 2  -NH      Reps 4 10  -NH      Exercise 5    Exercise Name 5 Doorway stretch Mid  -NH      Reps 5 2  -NH      Time (Seconds) 5 30  -NH      Additional Comments unilateral 90/90 position  -NH      Exercise 6    Exercise Name 6 ER doorway stretch  -NH      Reps 6 2  -NH      Time (Seconds) 6 30  -NH      Exercise 7    Exercise Name 7 IR S with Strap  -NH      Cueing 7 Verbal  -NH      Reps 7 2  -NH      Time (Seconds) 7 30  -NH      Exercise 8    Exercise Name 8 Supine ER stretch; hands behind head  -NH      Reps 8 3  -NH      Time (Seconds) 8 30  -NH      Exercise 9    Exercise Name 9 Sidelying AROM ABD  -NH      Sets 9 2  -NH      Time (Minutes) 9 10  -NH      Additional Comments 2# DB  -NH      Exercise 10    Exercise Name 10 Sidelying AROM ER  -NH      Sets 10 2  -NH      Time (Minutes) 10 10  -NH      Exercise 11    Exercise Name 11 Supine AA flexion  -NH      Sets 11 2  -NH      Reps 11 10  -NH      Additional Comments 5# bar  -NH      Exercise 12    Exercise Name 12 Supine PNF D2  flex/ext  -NH      Sets 12 2  -NH      Reps 12 10  -NH        User Key  (r) = Recorded By, (t) = Taken By, (c) = Cosigned By    Initials Name Provider Type    NH Jana Moreno, PT Physical Therapist                        Manual Rx (last 36 hours)      Manual Treatments       02/08/18 0900 02/07/18 0900       Manual Rx 1    Manual Rx 1 Location L Shoulder  -NH L Shoulder  -KG     Manual Rx 1 Type PROM all planes, GHJ mobes   -NH PROM all planes, GHJ mobes   -KG     Manual Rx 1 Duration 12 min  -NH 12 min  -KG       User Key  (r) = Recorded By, (t) = Taken By, (c) = Cosigned By    Initials Name Provider Type    KG Crista Rivas, PT Physical Therapist    NH Jana Moreno, PT Physical Therapist                PT OP Goals       02/08/18 0900       PT Short Term Goals    STG Date to Achieve 02/14/18  -NH     STG 1 Independent/compliant with HEP  -NH     STG 1 Progress Ongoing  -NH     STG 2 Tolerate 45 min treatment session without increased pain  -NH     STG 2 Progress Partially Met  -NH     STG 3 Demosntrate L shoulder flex PROM to 160 deg or greater  -NH     STG 3 Progress Progressing  -NH     STG 4 Demonstrate L shoulder abd PROM to 150 deg or greater  -NH     STG 4 Progress Ongoing  -NH     STG 5 Demonstrate L shoulder ER PROM to 55 deg or greater  -NH     STG 5 Progress Ongoing  -NH     Long Term Goals    LTG Date to Achieve 02/28/18  -NH     LTG 1 Subjectively report 60% improvement or greater  -NH     LTG 1 Progress Progressing  -NH     LTG 2 QuickDASH score to 30% or less  -NH     LTG 2 Progress Progressing  -NH     LTG 3 Demonstrate L shoulder flex AROM to 150 deg or greater  -NH     LTG 3 Progress Progressing  -NH     LTG 4 Demonstrate L shoulder abd AROM to 145 deg or greater  -NH     LTG 4 Progress Progressing  -NH     LTG 5 Demonstrate L shoulder flex/abd MMT to 4+/5  -NH     LTG 5 Progress Progressing  -NH     LTG 6 Demonstrate L shoulder IR/ER MMT to 4+/5  -NH     LTG 6 Progress Progressing  -NH      LTG 7 Demonstrate L shoulder ER AROM to 50 deg or greater  -NH     LTG 7 Progress Progressing  -NH     LTG 8 Demonstrate L IR functional reach to L4  -NH     LTG 8 Progress Progressing  -NH     Time Calculation    PT Goal Re-Cert Due Date 02/21/18  -NH       User Key  (r) = Recorded By, (t) = Taken By, (c) = Cosigned By    Initials Name Provider Type    NH Jana Moreno, PT Physical Therapist          Therapy Education  Given: HEP, Symptoms/condition management, Pain management, Posture/body mechanics  Program: Reinforced  How Provided: Verbal, Demonstration  Provided to: Patient  Level of Understanding: Verbalized, Demonstrated              Time Calculation:   Start Time: 0931  Stop Time: 1030  Time Calculation (min): 59 min  Total Timed Code Minutes- PT: 44 minute(s)    Therapy Charges for Today     Code Description Service Date Service Provider Modifiers Qty    44302428761 HC PT MANUAL THERAPY EA 15 MIN 2/8/2018 Jana Moreno, PT GP 1    11725603688 HC PT THER PROC EA 15 MIN 2/8/2018 Jana Moreno, PT GP 2    90853244656 HC PT THER SUPP EA 15 MIN 2/8/2018 Jana Moreno, PT GP 1    24402489959 HC PT ELECTRICAL STIM UNATTENDED 2/8/2018 Jana Moreno, PT  1                    Jana Moreno, PT  2/8/2018

## 2018-02-12 ENCOUNTER — HOSPITAL ENCOUNTER (OUTPATIENT)
Dept: PHYSICAL THERAPY | Facility: HOSPITAL | Age: 53
Setting detail: THERAPIES SERIES
Discharge: HOME | End: 2018-02-12

## 2018-02-12 DIAGNOSIS — M75.02 ADHESIVE CAPSULITIS OF LEFT SHOULDER: Primary | ICD-10-CM

## 2018-02-12 DIAGNOSIS — M25.512 LEFT SHOULDER PAIN, UNSPECIFIED CHRONICITY: ICD-10-CM

## 2018-02-12 PROCEDURE — G0283 ELEC STIM OTHER THAN WOUND: HCPCS

## 2018-02-12 PROCEDURE — 97110 THERAPEUTIC EXERCISES: CPT

## 2018-02-12 NOTE — THERAPY TREATMENT NOTE
"    Outpatient Physical Therapy Ortho Treatment Note  [facility]  Sabi Berg, PTA  18  2:37 PM       Patient Name: Patricia Palma  : 1965  MRN: [id number]  Today's Date: 2018      Visit Date: 2018    Subjective Improvement: \"much better\"      Attendance:    Approved: 24 visits           MD follow up: 18            date: 18        Visit Dx:    ICD-10-CM ICD-9-CM   1. Adhesive capsulitis of left shoulder M75.02 726.0   2. Left shoulder pain, unspecified chronicity M25.512 719.41       Patient Active Problem List   Diagnosis   • Lower abdominal pain   • Change in stool caliber   • Diverticulosis   • Diverticulosis of large intestine without hemorrhage   • Adhesive capsulitis of left shoulder        Past Medical History:   Diagnosis Date   • Diverticulosis    • Shoulder pain, left         Past Surgical History:   Procedure Laterality Date   • APPENDECTOMY     •  SECTION      x2   • COLONOSCOPY      10 years   • SHOULDER MANIPULATION Left 2018                             PT Assessment/Plan       18 1300       PT Assessment    Functional Limitations Limitation in home management;Limitations in community activities;Performance in leisure activities;Performance in self-care ADL;Performance in work activities  -     Impairments Impaired flexibility;Joint mobility;Muscle strength;Pain;Posture;Range of motion  -     Assessment Comments pt fatigued some with therex today. pt required cues for mini squats and sit to stands to keep weight on heels and wide EDUARDO.   -ALIZE     Rehab Potential Good  -ALIZE     Patient/caregiver participated in establishment of treatment plan and goals Yes  -ALIZE     Patient would benefit from skilled therapy intervention Yes  -ALIZE     PT Plan    PT Frequency 3x/week  -ALIZE     Predicted Duration of Therapy Intervention (days/wks) 4 weeks  -ALIZE     PT Plan Comments Supine tband chest pulls  -ALIZE       User Key  (r) = Recorded " By, (t) = Taken By, (c) = Cosigned By    Initials Name Provider Type    ALIZE Berg PTA Physical Therapy Assistant                Modalities       02/12/18 1300          Ice    Ice Applied Yes  -ALIZE      Location L Shoulder  -ALIZE      Rx Minutes 15 mins  -ALIZE      Ice S/P Rx Yes  -ALIZE      ELECTRICAL STIMULATION    Attended/Unattended Unattended  -ALIZE      Stimulation Type IFC  -ALIZE      Location/Electrode Placement/Other L Shoulder  -ALIZE      Rx Minutes 15 mins  -ALIZE        User Key  (r) = Recorded By, (t) = Taken By, (c) = Cosigned By    Initials Name Provider Type    ALIZE Sabi CHANDLER FERNANDO Berg Physical Therapy Assistant                Exercises       02/12/18 1300          Subjective Comments    Subjective Comments pt reports that she is feeling a lot better- states that she has been cleaning out stalls this morning  -ALIZE      Subjective Pain    Able to rate subjective pain? yes  -ALIZE      Pre-Treatment Pain Level --   dull ache  -ALIZE      Aquatics    Aquatics performed? No  -ALIZE      Exercise 1    Exercise Name 1 Pro II for ROM/endurance  -ALIZE      Time (Minutes) 1 10 min  -ALIZE      Additional Comments L4.0  -ALIZE      Exercise 2    Exercise Name 2 Pulleys 3 way  -ALIZE      Sets 2 1  -ALIZE      Reps 2 20  -ALIZE      Additional Comments each  -ALIZE      Exercise 3    Exercise Name 3 Tband: mid rows  -ALIZE      Sets 3 2  -ALIZE      Reps 3 10  -ALIZE      Additional Comments red  -ALIZE      Exercise 4    Exercise Name 4 Tband: shoulder ext  -ALIZE      Sets 4 2  -ALZIE      Reps 4 10  -ALIZE      Additional Comments red  -ALIZE      Exercise 5    Exercise Name 5 Wall slides FF/scap  -ALIZE      Sets 5 2  -ALIZE      Reps 5 10  -ALIZE      Exercise 6    Exercise Name 6 Wall ABCs  -ALIZE      Reps 6 1  -ALIZE      Exercise 7    Exercise Name 7 Doorway S  -ALIZE      Reps 7 3  -ALIZE      Time (Seconds) 7 30 sec hold  -ALIZE      Exercise 8    Exercise Name 8 IR S w/ strap  -ALIZE      Reps 8 3  -ALIZE      Time (Seconds) 8 30 sec hold  -ALIZE      Exercise 9    Exercise Name 9  Supine AA wand flexion  -ALIZE      Sets 9 2  -ALIZE      Reps 9 10  -ALIZE      Additional Comments 5# bar  -ALIZE      Exercise 10    Exercise Name 10 SL AROM abd  -ALIZE      Sets 10 2  -ALIZE      Reps 10 10  -ALIZE      Additional Comments 2# DB  -ALIZE      Exercise 11    Exercise Name 11 SL AROM ER  -ALIZE      Sets 11 2  -ALIZE      Reps 11 10  -ALIZE      Exercise 12    Exercise Name 12 Serratus punches  -ALIZE      Sets 12 2  -ALIZE      Reps 12 10  -ALIZE      Additional Comments 5# bar  -ALIZE      Exercise 13    Exercise Name 13 Supine PNF D2 flexion/ext  -ALIZE      Sets 13 2  -ALIZE      Reps 13 10  -ALIZE        User Key  (r) = Recorded By, (t) = Taken By, (c) = Cosigned By    Initials Name Provider Type    ALIZE Berg PTA Physical Therapy Assistant                        Manual Rx (last 36 hours)      Manual Treatments       02/12/18 1300          Manual Rx 1    Manual Rx 1 Location L Shoulder  -ALIZE      Manual Rx 1 Type PROM all planes, GHJ mobes   -ALIZE      Manual Rx 1 Duration 12 min  -ALIZE        User Key  (r) = Recorded By, (t) = Taken By, (c) = Cosigned By    Initials Name Provider Type    ALIZE Berg PTA Physical Therapy Assistant                PT OP Goals       02/12/18 1300       PT Short Term Goals    STG Date to Achieve 02/14/18  -     STG 1 Independent/compliant with HEP  -     STG 1 Progress Ongoing  -     STG 2 Tolerate 45 min treatment session without increased pain  -     STG 2 Progress Partially Met  -     STG 3 Demosntrate L shoulder flex PROM to 160 deg or greater  -     STG 3 Progress Progressing  -     STG 4 Demonstrate L shoulder abd PROM to 150 deg or greater  -     STG 4 Progress Ongoing  -     STG 5 Demonstrate L shoulder ER PROM to 55 deg or greater  -     STG 5 Progress Ongoing  -     Long Term Goals    LTG Date to Achieve 02/28/18  -     LTG 1 Subjectively report 60% improvement or greater  -     LTG 1 Progress Progressing  -     LTG 2 QuickDASH score to 30% or less  -     LTG  2 Progress Progressing  -ALIZE     LTG 3 Demonstrate L shoulder flex AROM to 150 deg or greater  -     LTG 3 Progress Progressing  -ALIZE     LTG 4 Demonstrate L shoulder abd AROM to 145 deg or greater  -     LTG 4 Progress Progressing  -ALIZE     LTG 5 Demonstrate L shoulder flex/abd MMT to 4+/5  -ALIZE     LTG 5 Progress Progressing  -ALIZE     LTG 6 Demonstrate L shoulder IR/ER MMT to 4+/5  -ALIZE     LTG 6 Progress Progressing  -ALIZE     LTG 7 Demonstrate L shoulder ER AROM to 50 deg or greater  -     LTG 7 Progress Progressing  -ALIZE     LTG 8 Demonstrate L IR functional reach to L4  -ALIZE     LTG 8 Progress Progressing  -ALIZE     Time Calculation    PT Goal Re-Cert Due Date 02/21/18  -       User Key  (r) = Recorded By, (t) = Taken By, (c) = Cosigned By    Initials Name Provider Type    ALIZE Berg PTA Physical Therapy Assistant          Therapy Education  Given: HEP, Symptoms/condition management, Pain management, Posture/body mechanics  Program: Reinforced  How Provided: Verbal, Demonstration  Provided to: Patient  Level of Understanding: Verbalized, Demonstrated              Time Calculation:   Start Time: 1302  Stop Time: 1402  Time Calculation (min): 60 min  Total Timed Code Minutes- PT: 60 minute(s)    Therapy Charges for Today     Code Description Service Date Service Provider Modifiers Qty    83295523380 HC PT THER PROC EA 15 MIN 2/12/2018 Sabi Berg PTA GP 3    76506487579 HC PT ELECTRICAL STIM UNATTENDED 2/12/2018 Sabi Berg PTA  1    37002075528 HC PT THER SUPP EA 15 MIN 2/12/2018 Sabi Berg PTA GP 1                    Sabi Berg PTA  2/12/2018

## 2018-02-13 ENCOUNTER — HOSPITAL ENCOUNTER (OUTPATIENT)
Dept: PHYSICAL THERAPY | Facility: HOSPITAL | Age: 53
Setting detail: THERAPIES SERIES
Discharge: HOME | End: 2018-02-13

## 2018-02-13 DIAGNOSIS — M75.02 ADHESIVE CAPSULITIS OF LEFT SHOULDER: Primary | ICD-10-CM

## 2018-02-13 DIAGNOSIS — M25.512 LEFT SHOULDER PAIN, UNSPECIFIED CHRONICITY: ICD-10-CM

## 2018-02-13 PROCEDURE — G0283 ELEC STIM OTHER THAN WOUND: HCPCS

## 2018-02-13 PROCEDURE — 97140 MANUAL THERAPY 1/> REGIONS: CPT

## 2018-02-13 PROCEDURE — 97110 THERAPEUTIC EXERCISES: CPT

## 2018-02-13 NOTE — THERAPY TREATMENT NOTE
"    Outpatient Physical Therapy Ortho Treatment Note  [facility]  Sabi Berg PTA  18  4:14 PM       Patient Name: Patricia Palma  : 1965  MRN: [id number]  Today's Date: 2018      Visit Date: 2018    Subjective Improvement: 70%       Attendance:    Approved: 24 visits          MD follow up: 2 weeks           RC date: 18         Visit Dx:    ICD-10-CM ICD-9-CM   1. Adhesive capsulitis of left shoulder M75.02 726.0   2. Left shoulder pain, unspecified chronicity M25.512 719.41       Patient Active Problem List   Diagnosis   • Lower abdominal pain   • Change in stool caliber   • Diverticulosis   • Diverticulosis of large intestine without hemorrhage   • Adhesive capsulitis of left shoulder        Past Medical History:   Diagnosis Date   • Diverticulosis    • Shoulder pain, left         Past Surgical History:   Procedure Laterality Date   • APPENDECTOMY     •  SECTION      x2   • COLONOSCOPY      10 years   • SHOULDER MANIPULATION Left 2018             PT Ortho       18 1500    Subjective Comments    Subjective Comments pt reports she feels good today  -    Precautions and Contraindications    Precautions/Limitations no known precautions/limitations  -    Contraindications s/p L shoulder manipulation 18  -    Subjective Pain    Able to rate subjective pain? yes  -    Pre-Treatment Pain Level --   \"dull ache\"  -    Post-Treatment Pain Level 0  -    Left Shoulder    Flexion AROM Deficit 162  -    ABduction AROM Deficit 160  -      User Key  (r) = Recorded By, (t) = Taken By, (c) = Cosigned By    Initials Name Provider Type     Sabi Berg PTA Physical Therapy Assistant                            PT Assessment/Plan       18 1500       PT Assessment    Functional Limitations Limitation in home management;Limitations in community activities;Performance in leisure activities;Performance in self-care " "ADL;Performance in work activities  -ALIZE     Impairments Impaired flexibility;Joint mobility;Muscle strength;Pain;Posture;Range of motion  -ALIZE     Assessment Comments pt continues to progress each visit. pt having some pain at end ranges.with PROM  -ALIZE     Rehab Potential Good  -ALIZE     Patient/caregiver participated in establishment of treatment plan and goals Yes  -ALIZE     Patient would benefit from skilled therapy intervention Yes  -ALIZE     PT Plan    PT Frequency 3x/week  -ALIZE     Predicted Duration of Therapy Intervention (days/wks) 4 weeks  -ALIZE     PT Plan Comments Wall push ups  -       User Key  (r) = Recorded By, (t) = Taken By, (c) = Cosigned By    Initials Name Provider Type    ALIZE Berg PTA Physical Therapy Assistant                Modalities       02/13/18 1500          Ice    Ice Applied Yes  -ALIZE      Location L Shoulder  -ALIZE      Rx Minutes 15 mins  -ALIZE      Ice S/P Rx Yes  -ALIZE      ELECTRICAL STIMULATION    Attended/Unattended Unattended  -      Stimulation Type IFC  -ALIZE      Location/Electrode Placement/Other L Shoulder  -ALIZE      Rx Minutes 15 mins  -ALIZE        User Key  (r) = Recorded By, (t) = Taken By, (c) = Cosigned By    Initials Name Provider Type    ALIZE Berg PTA Physical Therapy Assistant                Exercises       02/13/18 1500          Subjective Comments    Subjective Comments pt reports she feels good today  -      Subjective Pain    Able to rate subjective pain? yes  -ALIZE      Pre-Treatment Pain Level --   \"dull ache\"  -ALIZE      Post-Treatment Pain Level 0  -ALIZE      Aquatics    Aquatics performed? No  -ALIZE      Exercise 1    Exercise Name 1 Pro II for ROM/endurance  -ALIZE      Time (Minutes) 1 10 min  -ALIZE      Additional Comments L4.0  -ALIZE      Exercise 2    Exercise Name 2 Pulleys 3 way  -ALIZE      Sets 2 1  -ALIZE      Reps 2 20  -ALIZE      Additional Comments each  -ALIZE      Exercise 3    Exercise Name 3 Tband: mid rows  -ALIZE      Sets 3 2  -ALIZE      Reps 3 10  -ALIZE   "    Additional Comments red  -ALIZE      Exercise 4    Exercise Name 4 Tband: shoulder ext  -ALIZE      Sets 4 2  -ALIZE      Reps 4 10  -ALIZE      Additional Comments red  -ALIZE      Exercise 5    Exercise Name 5 Wall slides FF/scap  -ALIZE      Sets 5 2  -ALIZE      Reps 5 10  -ALIZE      Exercise 6    Exercise Name 6 Wall ABCs  -ALIZE      Reps 6 1  -ALIZE      Exercise 7    Exercise Name 7 Doorway S  -ALIZE      Reps 7 3  -ALIZE      Time (Seconds) 7 30 sec hold  -ALIZE      Exercise 8    Exercise Name 8 IR S w/ strap  -ALIZE      Reps 8 3  -ALIZE      Time (Seconds) 8 30 sec hold  -ALIZE      Exercise 9    Exercise Name 9 Supine chest pulls  -ALIZE      Sets 9 2  -ALIZE      Reps 9 10  -ALIZE      Additional Comments red  -ALIZE      Exercise 10    Exercise Name 10 Supine clocks  -ALIZE      Sets 10 2  -ALIZE      Reps 10 10  -ALIZE      Additional Comments red  -ALIZE        User Key  (r) = Recorded By, (t) = Taken By, (c) = Cosigned By    Initials Name Provider Type    ALIZE Berg PTA Physical Therapy Assistant                        Manual Rx (last 36 hours)      Manual Treatments       02/13/18 1500 02/12/18 1300       Manual Rx 1    Manual Rx 1 Location L Shoulder  -ALIZE L Shoulder  -ALIZE     Manual Rx 1 Type PROM all planes, GHJ mobes   -ALIZE PROM all planes, GHJ mobes   -ALIZE     Manual Rx 1 Duration 12 min  -ALIZE 12 min  -ALIZE       User Key  (r) = Recorded By, (t) = Taken By, (c) = Cosigned By    Initials Name Provider Type    ALIZE Berg PTA Physical Therapy Assistant                PT OP Goals       02/13/18 1500       PT Short Term Goals    STG Date to Achieve 02/14/18  -     STG 1 Independent/compliant with HEP  -     STG 1 Progress Ongoing  -     STG 2 Tolerate 45 min treatment session without increased pain  -     STG 2 Progress Partially Met  -     STG 3 Demosntrate L shoulder flex PROM to 160 deg or greater  -     STG 3 Progress Progressing  -     STG 4 Demonstrate L shoulder abd PROM to 150 deg or greater  -     STG 4 Progress Ongoing   -ALIZE     STG 5 Demonstrate L shoulder ER PROM to 55 deg or greater  -     STG 5 Progress Ongoing  -     Long Term Goals    LTG Date to Achieve 02/28/18  -     LTG 1 Subjectively report 60% improvement or greater  -     LTG 1 Progress Progressing  -ALIZE     LTG 2 QuickDASH score to 30% or less  -     LTG 2 Progress Progressing  -ALIZE     LTG 3 Demonstrate L shoulder flex AROM to 150 deg or greater  -     LTG 3 Progress Progressing  -ALIZE     LTG 4 Demonstrate L shoulder abd AROM to 145 deg or greater  -     LTG 4 Progress Progressing  -ALIZE     LTG 5 Demonstrate L shoulder flex/abd MMT to 4+/5  -ALIZE     LTG 5 Progress Progressing  -ALIZE     LTG 6 Demonstrate L shoulder IR/ER MMT to 4+/5  -ALIZE     LTG 6 Progress Progressing  -ALIZE     LTG 7 Demonstrate L shoulder ER AROM to 50 deg or greater  -     LTG 7 Progress Progressing  -ALIZE     LTG 8 Demonstrate L IR functional reach to L4  -     LTG 8 Progress Progressing  -     Time Calculation    PT Goal Re-Cert Due Date 02/21/18  -       User Key  (r) = Recorded By, (t) = Taken By, (c) = Cosigned By    Initials Name Provider Type    ALIZE Berg PTA Physical Therapy Assistant          Therapy Education  Given: HEP, Symptoms/condition management, Pain management, Posture/body mechanics  Program: Reinforced  How Provided: Verbal, Demonstration  Provided to: Patient  Level of Understanding: Verbalized, Demonstrated              Time Calculation:   Start Time: 1515  Stop Time: 1625  Time Calculation (min): 70 min  Total Timed Code Minutes- PT: 55 minute(s)    Therapy Charges for Today     Code Description Service Date Service Provider Modifiers Qty    51051745206 HC PT THER PROC EA 15 MIN 2/13/2018 Sabi Berg, PTA GP 2    33614653733 HC PT MANUAL THERAPY EA 15 MIN 2/13/2018 Sabi Berg, PTA GP 1    39069148887 HC PT ELECTRICAL STIM UNATTENDED 2/13/2018 Sabi Berg, PTA  1    02875887865 HC PT THER SUPP EA 15 MIN 2/13/2018 Sabi CHANDLER  Otis, PTA GP 1                    Sabi Berg, PTA  2/13/2018

## 2018-02-15 ENCOUNTER — HOSPITAL ENCOUNTER (OUTPATIENT)
Dept: PHYSICAL THERAPY | Facility: HOSPITAL | Age: 53
Setting detail: THERAPIES SERIES
Discharge: HOME | End: 2018-02-15

## 2018-02-15 DIAGNOSIS — M25.512 LEFT SHOULDER PAIN, UNSPECIFIED CHRONICITY: ICD-10-CM

## 2018-02-15 DIAGNOSIS — M75.02 ADHESIVE CAPSULITIS OF LEFT SHOULDER: Primary | ICD-10-CM

## 2018-02-15 PROCEDURE — 97140 MANUAL THERAPY 1/> REGIONS: CPT

## 2018-02-15 PROCEDURE — 97110 THERAPEUTIC EXERCISES: CPT

## 2018-02-15 PROCEDURE — G0283 ELEC STIM OTHER THAN WOUND: HCPCS

## 2018-02-15 NOTE — THERAPY TREATMENT NOTE
"    Outpatient Physical Therapy Ortho Treatment Note  [facility]  Sabi Berg, [facility]  02/15/18  1:56 PM       Patient Name: Patricia Palma  : 1965  MRN: [id number]  Today's Date: 2/15/2018      Visit Date: 02/15/2018    Subjective Improvement: 70%       Attendance:    Approved: 24 visits           MD follow up: 18            date: 18         Visit Dx:    ICD-10-CM ICD-9-CM   1. Adhesive capsulitis of left shoulder M75.02 726.0   2. Left shoulder pain, unspecified chronicity M25.512 719.41       Patient Active Problem List   Diagnosis   • Lower abdominal pain   • Change in stool caliber   • Diverticulosis   • Diverticulosis of large intestine without hemorrhage   • Adhesive capsulitis of left shoulder        Past Medical History:   Diagnosis Date   • Diverticulosis    • Shoulder pain, left         Past Surgical History:   Procedure Laterality Date   • APPENDECTOMY     •  SECTION      x2   • COLONOSCOPY      10 years   • SHOULDER MANIPULATION Left 2018             PT Ortho       02/15/18 1300    Subjective Comments    Subjective Comments pt states that she did something that kind of aggravated her shoulder yesterday and she is feeling it a little more today than usual  -    Precautions and Contraindications    Precautions/Limitations no known precautions/limitations  -    Contraindications s/p L shoulder manipulation 18  -ALIZE    Subjective Pain    Able to rate subjective pain? yes  -    Pre-Treatment Pain Level 3  -ALIZE      18 1500    Subjective Comments    Subjective Comments pt reports she feels good today  -    Precautions and Contraindications    Precautions/Limitations no known precautions/limitations  -    Contraindications s/p L shoulder manipulation 18  -ALIZE    Subjective Pain    Able to rate subjective pain? yes  -    Pre-Treatment Pain Level --   \"dull ache\"  -    Post-Treatment Pain Level 0  -ALIZE    Left Shoulder    Flexion " AROM Deficit 162  -    ABduction AROM Deficit 160  -      User Key  (r) = Recorded By, (t) = Taken By, (c) = Cosigned By    Initials Name Provider Type    ALIZE Berg PTA Physical Therapy Assistant                            PT Assessment/Plan       02/15/18 1300       PT Assessment    Functional Limitations Limitation in home management;Limitations in community activities;Performance in leisure activities;Performance in self-care ADL;Performance in work activities  -     Impairments Impaired flexibility;Joint mobility;Muscle strength;Pain;Posture;Range of motion  -     Assessment Comments pt AROM continues to stay the same. pt requested more manual treatment today due to increase aggravation so deferred supine therex today  -     Rehab Potential Good  -     Patient/caregiver participated in establishment of treatment plan and goals Yes  -     Patient would benefit from skilled therapy intervention Yes  -     PT Plan    PT Frequency 3x/week  -ALIZE     Predicted Duration of Therapy Intervention (days/wks) 4 weeks  -     PT Plan Comments Resume supine clocks  -       User Key  (r) = Recorded By, (t) = Taken By, (c) = Cosigned By    Initials Name Provider Type    ALIZE Berg PTA Physical Therapy Assistant                Modalities       02/15/18 1300          Ice    Ice Applied Yes  -      Location L Shoulder  -ALIZE      Rx Minutes 15 mins  -      Ice S/P Rx Yes  -      ELECTRICAL STIMULATION    Attended/Unattended Unattended  -      Stimulation Type IFC  -      Location/Electrode Placement/Other L Shoulder  -ALIZE      Rx Minutes 15 mins  -        User Key  (r) = Recorded By, (t) = Taken By, (c) = Cosigned By    Initials Name Provider Type    ALIZE Berg PTA Physical Therapy Assistant                Exercises       02/15/18 1300          Subjective Comments    Subjective Comments pt states that she did something that kind of aggravated her shoulder yesterday  "and she is feeling it a little more today than usual  -ALIZE      Subjective Pain    Able to rate subjective pain? yes  -ALIZE      Pre-Treatment Pain Level 3  -ALIZE      Post-Treatment Pain Level --   \"numb\"  -ALIZE      Aquatics    Aquatics performed? No  -ALIZE      Exercise 1    Exercise Name 1 Pro II for ROM/endurance  -ALIZE      Time (Minutes) 1 10 min  -ALIZE      Exercise 2    Exercise Name 2 Pulleys 3 way  -ALIZE      Sets 2 1  -ALIZE      Reps 2 20  -ALIZE      Additional Comments each  -ALIZE      Exercise 3    Exercise Name 3 Tband: mid rows  -ALIZE      Sets 3 2  -ALIZE      Reps 3 10  -ALIZE      Exercise 4    Exercise Name 4 Tband: shoulder ext  -ALIZE      Sets 4 2  -ALIZE      Reps 4 10  -ALIZE      Exercise 5    Exercise Name 5 Wall slides FF/scap  -ALIZE      Sets 5 2  -ALIZE      Reps 5 10  -ALIZE      Exercise 6    Exercise Name 6 Wall ABCs  -ALIZE      Reps 6 1  -ALIZE      Exercise 7    Exercise Name 7 Doorway S  -ALIZE      Reps 7 3  -ALIZE      Time (Seconds) 7 30 sec hold  -ALIZE      Exercise 8    Exercise Name 8 IR S w/ strap  -ALIZE      Reps 8 3  -ALIZE      Time (Seconds) 8 30 sec hold  -ALIZE      Exercise 9    Exercise Name 9 Supine chest pulls  -ALIZE      Sets 9 2  -ALIZE      Reps 9 10  -ALIZE      Additional Comments deferred due to tome  -ALIZE      Exercise 10    Exercise Name 10 Supine clocks  -ALIZE      Sets 10 2  -ALIZE      Reps 10 10  -ALIZE      Additional Comments deferred due to time  -ALIZE      Exercise 11    Exercise Name 11 Wall push ups  -ALIZE      Sets 11 2  -ALIZE      Reps 11 10  -ALIZE        User Key  (r) = Recorded By, (t) = Taken By, (c) = Cosigned By    Initials Name Provider Type    ALIZE Berg, PTA Physical Therapy Assistant                        Manual Rx (last 36 hours)      Manual Treatments       02/15/18 1300          Manual Rx 1    Manual Rx 1 Location L Shoulder  -ALIZE      Manual Rx 1 Type PROM all planes, GHJ mobes   -ALIZE      Manual Rx 1 Duration 15 min  -ALIZE        User Key  (r) = Recorded By, (t) = Taken By, (c) = Cosigned By    Initials Name " Provider Type    ALIZE Berg PTA Physical Therapy Assistant                PT OP Goals       02/15/18 1300       PT Short Term Goals    STG Date to Achieve 02/14/18  -     STG 1 Independent/compliant with HEP  -     STG 1 Progress Ongoing  -     STG 2 Tolerate 45 min treatment session without increased pain  -     STG 2 Progress Partially Met  -     STG 3 Demosntrate L shoulder flex PROM to 160 deg or greater  -     STG 3 Progress Progressing  -     STG 4 Demonstrate L shoulder abd PROM to 150 deg or greater  -     STG 4 Progress Ongoing  -     STG 5 Demonstrate L shoulder ER PROM to 55 deg or greater  -     STG 5 Progress Ongoing  -     Long Term Goals    LTG Date to Achieve 02/28/18  -     LTG 1 Subjectively report 60% improvement or greater  -     LTG 1 Progress Progressing  -     LTG 2 QuickDASH score to 30% or less  -     LTG 2 Progress Progressing  -     LTG 3 Demonstrate L shoulder flex AROM to 150 deg or greater  -     LTG 3 Progress Progressing  -     LTG 4 Demonstrate L shoulder abd AROM to 145 deg or greater  -     LTG 4 Progress Progressing  -     LTG 5 Demonstrate L shoulder flex/abd MMT to 4+/5  -ALIZE     LTG 5 Progress Progressing  -     LTG 6 Demonstrate L shoulder IR/ER MMT to 4+/5  -ALIZE     LTG 6 Progress Progressing  -     LTG 7 Demonstrate L shoulder ER AROM to 50 deg or greater  -     LTG 7 Progress Progressing  -     LTG 8 Demonstrate L IR functional reach to L4  -     LTG 8 Progress Progressing  -     Time Calculation    PT Goal Re-Cert Due Date 02/21/18  -       User Key  (r) = Recorded By, (t) = Taken By, (c) = Cosigned By    Initials Name Provider Type    ALIZE Berg PTA Physical Therapy Assistant          Therapy Education  Given: HEP, Symptoms/condition management, Pain management, Posture/body mechanics  Program: Reinforced  How Provided: Verbal, Demonstration  Provided to: Patient  Level of Understanding:  Verbalized, Demonstrated              Time Calculation:   Start Time: 1303  Stop Time: 1409  Time Calculation (min): 66 min  Total Timed Code Minutes- PT: 51 minute(s)    Therapy Charges for Today     Code Description Service Date Service Provider Modifiers Qty    29090310122 HC PT THER PROC EA 15 MIN 2/15/2018 Sabi Berg, PTA GP 2    34116863712 HC PT MANUAL THERAPY EA 15 MIN 2/15/2018 Sabi Berg, PTA GP 1    24274207435 HC PT ELECTRICAL STIM UNATTENDED 2/15/2018 Sabi Berg, PTA  1    55479699877 HC PT THER SUPP EA 15 MIN 2/15/2018 Sabi Berg, PTA GP 1                    Sabi Berg, PTA  2/15/2018

## 2018-02-19 ENCOUNTER — HOSPITAL ENCOUNTER (OUTPATIENT)
Dept: PHYSICAL THERAPY | Facility: HOSPITAL | Age: 53
Setting detail: THERAPIES SERIES
Discharge: HOME | End: 2018-02-19

## 2018-02-19 DIAGNOSIS — M75.02 ADHESIVE CAPSULITIS OF LEFT SHOULDER: Primary | ICD-10-CM

## 2018-02-19 DIAGNOSIS — M25.512 LEFT SHOULDER PAIN, UNSPECIFIED CHRONICITY: ICD-10-CM

## 2018-02-19 PROCEDURE — G0283 ELEC STIM OTHER THAN WOUND: HCPCS

## 2018-02-19 PROCEDURE — 97140 MANUAL THERAPY 1/> REGIONS: CPT

## 2018-02-19 PROCEDURE — 97110 THERAPEUTIC EXERCISES: CPT

## 2018-02-19 NOTE — THERAPY TREATMENT NOTE
Outpatient Physical Therapy Ortho Treatment Note  [facility], [place], operated by [facility]  Sabi Berg PTA  18  3:40 PM       Patient Name: Patricia Palma  : 1965  MRN: [id number]  Today's Date: 2018      Visit Date: 2018    Subjective Improvement: 75%       Attendance:   Approved: 24 visits            MD follow up: 18            date: 18         Visit Dx:    ICD-10-CM ICD-9-CM   1. Adhesive capsulitis of left shoulder M75.02 726.0   2. Left shoulder pain, unspecified chronicity M25.512 719.41       Patient Active Problem List   Diagnosis   • Lower abdominal pain   • Change in stool caliber   • Diverticulosis   • Diverticulosis of large intestine without hemorrhage   • Adhesive capsulitis of left shoulder        Past Medical History:   Diagnosis Date   • Diverticulosis    • Shoulder pain, left         Past Surgical History:   Procedure Laterality Date   • APPENDECTOMY     •  SECTION      x2   • COLONOSCOPY      10 years   • SHOULDER MANIPULATION Left 2018             PT Ortho       18 1400    Subjective Comments    Subjective Comments pt states that she has been working hard in [facility] this morning  -    Precautions and Contraindications    Precautions/Limitations no known precautions/limitations  -    Contraindications s/p L shoulder manipulation 18  -    Subjective Pain    Able to rate subjective pain? yes  -    Post-Treatment Pain Level 0  -      User Key  (r) = Recorded By, (t) = Taken By, (c) = Cosigned By    Initials Name Provider Type    ALIZE Sabi Berg PTA Physical Therapy Assistant                            PT Assessment/Plan       18 1500       PT Assessment    Functional Limitations Limitation in home management;Limitations in community activities;Performance in leisure activities;Performance in self-care ADL;Performance in work activities  -     Impairments Impaired flexibility;Joint mobility;Muscle  strength;Pain;Posture;Range of motion  -ALIZE     Assessment Comments pt progressing well in therapy. pt complains of lat/post shoulder pain with certain exercises. Compliant overall with [facility]  -     Rehab Potential Good  -ALIZE     Patient/caregiver participated in establishment of treatment plan and goals Yes  -ALIZE     Patient would benefit from skilled therapy intervention Yes  -ALIZE     PT Plan    PT Frequency 3x/week  -ALIZE     Predicted Duration of Therapy Intervention (days/wks) 4 weeks  -ALIZE     PT Plan Comments Cont PRE and scap strengthening  -ALIZE       User Key  (r) = Recorded By, (t) = Taken By, (c) = Cosigned By    Initials Name Provider Type    ALIZE Berg PTA Physical Therapy Assistant                Modalities       02/19/18 1400          Ice    Ice Applied Yes  -ALIZE      Location L Shoulder  -ALIZE      Rx Minutes 15 mins  -ALIZE      Ice S/P Rx Yes  -ALIZE      ELECTRICAL STIMULATION    Attended/Unattended Unattended  -ALIZE      Stimulation Type IFC  -ALIZE      Location/Electrode Placement/Other L Shoulder  -ALIZE      Rx Minutes 15 mins  -ALIZE        User Key  (r) = Recorded By, (t) = Taken By, (c) = Cosigned By    Initials Name Provider Type    ALIZE Berg PTA Physical Therapy Assistant                Exercises       02/19/18 1400          Subjective Comments    Subjective Comments pt states that she has been working hard in [facility] this morning  -      Subjective Pain    Able to rate subjective pain? yes  -ALIZE      Pre-Treatment Pain Level 1  -ALIZE      Post-Treatment Pain Level 0  -ALIZE      Aquatics    Aquatics performed? No  -ALIZE      Exercise 1    Exercise Name 1 Pro II for ROM/endurance  -      Time (Minutes) 1 10 min  -ALIZE      Exercise 2    Exercise Name 2 Pulleys 3 way  -ALIZE      Sets 2 1  -ALIZE      Reps 2 20  -ALIZE      Exercise 3    Exercise Name 3 Tband: mid rows  -ALIZE      Sets 3 2  -ALIZE      Reps 3 10  -ALIZE      Exercise 4    Exercise Name 4 Tband: shoulder ext  -ALIZE      Sets 4 2  -ALIZE      Reps 4 10   -ALIZE      Exercise 5    Exercise Name 5 Wall slides FF/scap  -ALIZE      Sets 5 2  -ALIZE      Reps 5 10  -ALIZE      Exercise 6    Exercise Name 6 Wall ABCs  -ALIZE      Reps 6 1  -ALIZE      Exercise 7    Exercise Name 7 Doorway S  -ALIZE      Reps 7 3  -ALIZE      Time (Seconds) 7 30 sec hold  -ALIZE      Exercise 8    Exercise Name 8 IR S w/ strap  -ALIZE      Reps 8 3  -ALIZE      Time (Seconds) 8 30 sec hold  -ALIZE      Exercise 9    Exercise Name 9 Standing chest pulls  -ALIZE      Sets 9 1  -ALIZE      Reps 9 10  -ALIZE      Additional Comments red  -ALIZE      Exercise 10    Exercise Name 10 Standing clocks  -ALIZE      Sets 10 1  -ALIZE      Reps 10 10  -ALIZE      Additional Comments red  -ALIZE      Exercise 11    Exercise Name 11 Wall push ups  -ALIZE      Sets 11 2  -ALIZE      Reps 11 10  -ALIZE      Exercise 12    Exercise Name 12 PRE: FF/scap/abd  -ALIZE      Sets 12 1  -ALIZE      Reps 12 10  -ALIZE        User Key  (r) = Recorded By, (t) = Taken By, (c) = Cosigned By    Initials Name Provider Type    ALIZE Berg PTA Physical Therapy Assistant                        Manual Rx (last 36 hours)      Manual Treatments       02/19/18 1500          Manual Rx 1    Manual Rx 1 Location L Shoulder  -      Manual Rx 1 Type PROM all planes, GHJ mobes   -      Manual Rx 1 Duration 8 min  -        User Key  (r) = Recorded By, (t) = Taken By, (c) = Cosigned By    Initials Name Provider Type    ALIZE Berg PTA Physical Therapy Assistant                PT OP Goals       02/19/18 1500       PT Short Term Goals    STG Date to Achieve 02/14/18  -     STG 1 Independent/compliant with HEP  -     STG 1 Progress Met  -ALIZE     STG 2 Tolerate 45 min treatment session without increased pain  -     STG 2 Progress Met  -ALIZE     STG 3 Demosntrate L shoulder flex PROM to 160 deg or greater  -     STG 3 Progress Met  -ALIZE     STG 4 Demonstrate L shoulder abd PROM to 150 deg or greater  -     STG 4 Progress Met  -ALIZE     STG 5 Demonstrate L shoulder ER PROM to 55 deg or  greater  -     STG 5 Progress Ongoing  -     Long Term Goals    LTG Date to Achieve 02/28/18  -     LTG 1 Subjectively report 60% improvement or greater  -     LTG 1 Progress Met  -ALIZE     LTG 2 QuickDASH score to 30% or less  -     LTG 2 Progress Progressing  -ALIZE     LTG 3 Demonstrate L shoulder flex AROM to 150 deg or greater  -     LTG 3 Progress Progressing  -ALIZE     LTG 4 Demonstrate L shoulder abd AROM to 145 deg or greater  -     LTG 4 Progress Progressing  -ALIZE     LTG 5 Demonstrate L shoulder flex/abd MMT to 4+/5  -ALIZE     LTG 5 Progress Progressing  -ALIZE     LTG 6 Demonstrate L shoulder IR/ER MMT to 4+/5  -ALIZE     LTG 6 Progress Progressing  -ALIZE     LTG 7 Demonstrate L shoulder ER AROM to 50 deg or greater  -     LTG 7 Progress Progressing  -ALIZE     LTG 8 Demonstrate L IR functional reach to L4  -     LTG 8 Progress Progressing  -     Time Calculation    PT Goal Re-Cert Due Date 02/21/18  -       User Key  (r) = Recorded By, (t) = Taken By, (c) = Cosigned By    Initials Name Provider Type    ALIZE Berg PTA Physical Therapy Assistant          Therapy Education  Given: HEP, Symptoms/condition management, Pain management, Posture/body mechanics  Program: Reinforced  How Provided: Verbal, Demonstration  Provided to: Patient  Level of Understanding: Verbalized, Demonstrated              Time Calculation:   Start Time: 1435  Stop Time: 1539  Time Calculation (min): 64 min  Total Timed Code Minutes- PT: 49 minute(s)    Therapy Charges for Today     Code Description Service Date Service Provider Modifiers Qty    87453958856 HC PT THER PROC EA 15 MIN 2/19/2018 Sabi Berg PTA GP 2    47443380027 HC PT MANUAL THERAPY EA 15 MIN 2/19/2018 Sabi Berg PTA GP 1    27301100747 HC PT ELECTRICAL STIM UNATTENDED 2/19/2018 Sabi Berg PTA  1    73709473109 HC PT THER SUPP EA 15 MIN 2/19/2018 Sabi Berg PTA GP 1                    Sabi Berg  PTA  2/19/2018

## 2018-02-21 ENCOUNTER — HOSPITAL ENCOUNTER (OUTPATIENT)
Dept: PHYSICAL THERAPY | Facility: HOSPITAL | Age: 53
Setting detail: THERAPIES SERIES
Discharge: HOME | End: 2018-02-21

## 2018-02-21 DIAGNOSIS — M75.02 ADHESIVE CAPSULITIS OF LEFT SHOULDER: Primary | ICD-10-CM

## 2018-02-21 DIAGNOSIS — M25.512 LEFT SHOULDER PAIN, UNSPECIFIED CHRONICITY: ICD-10-CM

## 2018-02-21 PROCEDURE — 97110 THERAPEUTIC EXERCISES: CPT

## 2018-02-21 PROCEDURE — G0283 ELEC STIM OTHER THAN WOUND: HCPCS

## 2018-02-21 PROCEDURE — 97140 MANUAL THERAPY 1/> REGIONS: CPT

## 2018-02-21 NOTE — THERAPY PROGRESS REPORT/RE-CERT
Outpatient Physical Therapy Ortho Progress Note  [facility] [place]     Patient Name: Patricia Palma  : 1965  MRN: [id number]  Today's Date: 2018      Subjective Improvement: 75%       Attendance:   Approved: 24 visits            MD follow up: 18            date: 18        Visit Date: 2018    Patient Active Problem List   Diagnosis   • Lower abdominal pain   • Change in stool caliber   • Diverticulosis   • Diverticulosis of large intestine without hemorrhage   • Adhesive capsulitis of left shoulder        Past Medical History:   Diagnosis Date   • Diverticulosis    • Shoulder pain, left         Past Surgical History:   Procedure Laterality Date   • APPENDECTOMY     •  SECTION      x2   • COLONOSCOPY      10 years   • SHOULDER MANIPULATION Left 2018       Visit Dx:     ICD-10-CM ICD-9-CM   1. Adhesive capsulitis of left shoulder M75.02 726.0   2. Left shoulder pain, unspecified chronicity M25.512 719.41                 PT Ortho       18 1400    Precautions and Contraindications    Precautions/Limitations no known precautions/limitations  -NH    Contraindications s/p L shoulder manipulation 18  -NH    Left Shoulder    Flexion AROM Deficit 165  -NH    Flexion PROM Deficit 165  -NH    ABduction AROM Deficit 164  -NH    ABduction PROM Deficit 160  -NH    External Rotation AROM Deficit 50; T2 functional reach  -NH    External Rotation PROM Deficit 65  -NH    Internal Rotation AROM Deficit L5 functional reach  -NH    Internal Rotation PROM Deficit 70  -NH    Left Shoulder    Flexion Gross Movement (4-/5) good minus  -NH    Extension Gross Movement (4+/5) good plus  -NH    ABduction Gross Movement (4-/5) good minus  -NH    Int Rotation Gross Movement (4/5) good  -NH    Ext Rotation Gross Movement (4-/5) good minus  -NH      18 1400    Subjective Comments    Subjective Comments pt states that she has been working hard in  [employer] this morning  -ALIZE     Precautions and Contraindications    Precautions/Limitations no known precautions/limitations  -ALIZE    Contraindications s/p L shoulder manipulation 1/30/18  -ALIZE    Subjective Pain    Able to rate subjective pain? yes  -ALIZE    Post-Treatment Pain Level 0  -ALIZE      User Key  (r) = Recorded By, (t) = Taken By, (c) = Cosigned By    Initials Name Provider Type    ALIZE Berg, PTA Physical Therapy Assistant    NH Jana Moreno, PT Physical Therapist                      Therapy Education  Given: HEP, Symptoms/condition management, Pain management, Posture/body mechanics  Program: Reinforced  How Provided: Verbal, Demonstration  Provided to: Patient  Level of Understanding: Verbalized, Demonstrated           PT OP Goals       02/21/18 1400 02/21/18 1300    PT Short Term Goals    STG Date to Achieve 02/14/18  -NH     STG 1 Independent/compliant with HEP  -NH     STG 1 Progress Met  -NH     STG 2 Tolerate 45 min treatment session without increased pain  -NH     STG 2 Progress Met  -NH     STG 3 Demosntrate L shoulder flex PROM to 160 deg or greater  -NH     STG 3 Progress Met  -NH     STG 4 Demonstrate L shoulder abd PROM to 150 deg or greater  -NH     STG 4 Progress Met  -NH     STG 5 Demonstrate L shoulder ER PROM to 55 deg or greater  -NH     STG 5 Progress Met  -NH     Long Term Goals    LTG Date to Achieve 02/28/18  -NH     LTG 1 Subjectively report 60% improvement or greater  -NH     LTG 1 Progress Met  -NH     LTG 2 QuickDASH score to 30% or less  -NH     LTG 2 Progress Progressing  -NH     LTG 3 Demonstrate L shoulder flex AROM to 150 deg or greater  -NH     LTG 3 Progress Progressing  -NH     LTG 4 Demonstrate L shoulder abd AROM to 145 deg or greater  -NH     LTG 4 Progress Progressing  -NH     LTG 5 Demonstrate L shoulder flex/abd MMT to 4+/5  -NH     LTG 5 Progress Progressing  -NH     LTG 6 Demonstrate L shoulder IR/ER MMT to 4+/5  -NH     LTG 6 Progress Progressing  -NH     LTG 7 Demonstrate L  shoulder ER AROM to 50 deg or greater  -NH     LTG 7 Progress Progressing  -NH     LTG 8 Demonstrate L IR functional reach to L4  -NH     LTG 8 Progress Progressing  -NH     Time Calculation    PT Goal Re-Cert Due Date  03/02/18  -NH      User Key  (r) = Recorded By, (t) = Taken By, (c) = Cosigned By    Initials Name Provider Type    NH Jana Moreno, PT Physical Therapist                PT Assessment/Plan       02/21/18 1400       PT Assessment    Functional Limitations Limitation in home management;Limitations in community activities;Performance in leisure activities;Performance in self-care ADL;Performance in work activities  -NH     Impairments Impaired flexibility;Joint mobility;Muscle strength;Pain;Posture;Range of motion  -NH     Assessment Comments Patient progressing steadily s/p L shoulder manipulation. MD note sent with Patient. Patient presented with AROM and PROM L shoulder WFL. Patient cont to lack strength in L shoulder, especially in abduction motion. Patient cont to have increased pain with outward reaching motion and difficulty with ER. Patient would benefit from additonal week of PT to progress toward IND HEP.   -NH     Rehab Potential Good  -NH     Patient/caregiver participated in establishment of treatment plan and goals Yes  -NH     Patient would benefit from skilled therapy intervention Yes  -NH     PT Plan    PT Frequency 2x/week  -NH     Predicted Duration of Therapy Intervention (days/wks) 1 week  -NH     PT Plan Comments Advance toward discharge next week if MD agrees.   -NH       User Key  (r) = Recorded By, (t) = Taken By, (c) = Cosigned By    Initials Name Provider Type    NH Jana Moreno, PT Physical Therapist                Modalities       02/21/18 1300          Ice    Ice Applied Yes  -NH      Location L Shoulder  -NH      Rx Minutes 15 mins  -NH      Ice S/P Rx Yes  -NH      ELECTRICAL STIMULATION    Attended/Unattended Unattended  -NH      Stimulation Type IFC  -NH       Location/Electrode Placement/Other L Shoulder  -NH        User Key  (r) = Recorded By, (t) = Taken By, (c) = Cosigned By    Initials Name Provider Type    NH Jana Moreno, PT Physical Therapist              Exercises       02/21/18 1400          Subjective Comments    Subjective Comments Patient reports there are still certain motions such as up and out to the side and turning motions that bother the shoulder.  -NH      Subjective Pain    Able to rate subjective pain? yes  -NH      Exercise 1    Exercise Name 1 Pro II for ROM/endurance  -NH      Time (Minutes) 1 10 min  -NH      Exercise 2    Exercise Name 2 Pulleys 3 way  -NH      Sets 2 1  -NH      Reps 2 20  -NH      Exercise 3    Exercise Name 3 Tband: mid rows  -NH      Sets 3 2  -NH      Reps 3 10  -NH      Exercise 4    Exercise Name 4 Tband: shoulder ext  -NH      Sets 4 2  -NH      Reps 4 10  -NH      Exercise 5    Exercise Name 5 Wall slides FF/scap  -NH      Sets 5 2  -NH      Reps 5 10  -NH      Exercise 6    Exercise Name 6 Wall ABCs  -NH      Reps 6 1  -NH      Exercise 7    Exercise Name 7 Doorway S  -NH      Reps 7 3  -NH      Time (Seconds) 7 30 sec hold  -NH      Exercise 8    Exercise Name 8 IR S w/ strap  -NH      Reps 8 3  -NH      Time (Seconds) 8 30 sec hold  -NH      Exercise 9    Exercise Name 9 Standing chest pulls  -NH      Sets 9 1  -NH      Reps 9 10  -NH      Exercise 10    Exercise Name 10 Standing clocks  -NH      Sets 10 1  -NH      Reps 10 10  -NH      Exercise 11    Exercise Name 11 Wall push ups  -NH      Sets 11 2  -NH      Reps 11 10  -NH      Exercise 12    Exercise Name 12 PRE: FF/scap/abd  -NH      Sets 12 1  -NH      Reps 12 10  -NH        User Key  (r) = Recorded By, (t) = Taken By, (c) = Cosigned By    Initials Name Provider Type    NH Jana Moreno, PT Physical Therapist           Manual Rx (last 36 hours)      Manual Treatments       02/21/18 1300          Manual Rx 1    Manual Rx 1 Location L Shoulder  -NH       Manual Rx 1 Type PROM all planes, GHJ mobes   -NH      Manual Rx 1 Duration 8 min  -NH        User Key  (r) = Recorded By, (t) = Taken By, (c) = Cosigned By    Initials Name Provider Type    NH Jana Moreno, PT Physical Therapist                      Outcome Measure Options: Quick DASH  Quick DASH  Open a tight or new jar.: Moderate Difficulty  Do heavy household chores (e.g., wash walls, wash floors): Mild Difficulty  Carry a shopping bag or briefcase: No Difficulty  Wash your back: No Difficulty  Use a knife to cut food: No Difficulty  Recreational activities in which you take some force or impact through your arm, should or hand (e.g. golf, hammering, tennis, etc.): Moderate Difficulty  During the past week, to what extent has your arm, shoulder, or hand problem interfered with your normal social activites with family, friends, neighbors or groups?: Slightly  During the past week, were you limited in your work or other regular daily activities as a result of your arm, shoulder or hand problem?: Slightly Limited  Arm, Shoulder, or hand pain: Mild  Tingling (pins and needles) in your arm, shoulder, or hand: None  During the past week, how much difficulty have you had sleeping because of the pain in your arm, shoulder or hand?: No difficulty  Number of Questions Answered: 11  Quick DASH Score: 18.18         Time Calculation:   Start Time: 1353  Stop Time: 1447  Time Calculation (min): 54 min  Total Timed Code Minutes- PT: 39 minute(s)     Therapy Charges for Today     Code Description Service Date Service Provider Modifiers Qty    17677506598 HC PT MANUAL THERAPY EA 15 MIN 2/21/2018 Jana Moreno, PT GP 1    25153408905 HC PT THER PROC EA 15 MIN 2/21/2018 Jana Moreno, PT GP 2    42448512268 HC PT THER SUPP EA 15 MIN 2/21/2018 Jana Moreno, PT GP 1    54711362519 HC PT ELECTRICAL STIM UNATTENDED 2/21/2018 Jana Moreno, PT  1          PT G-Codes  Outcome Measure Options: Quick DASH         Jana COLVIN  Tiffanie, PT  2/21/2018

## 2018-02-22 ENCOUNTER — HOSPITAL ENCOUNTER (OUTPATIENT)
Dept: PHYSICAL THERAPY | Facility: HOSPITAL | Age: 53
Setting detail: THERAPIES SERIES
Discharge: HOME | End: 2018-02-22

## 2018-02-22 DIAGNOSIS — M75.02 ADHESIVE CAPSULITIS OF LEFT SHOULDER: Primary | ICD-10-CM

## 2018-02-22 DIAGNOSIS — M25.512 LEFT SHOULDER PAIN, UNSPECIFIED CHRONICITY: ICD-10-CM

## 2018-02-22 PROCEDURE — 97140 MANUAL THERAPY 1/> REGIONS: CPT

## 2018-02-22 PROCEDURE — G0283 ELEC STIM OTHER THAN WOUND: HCPCS

## 2018-02-22 PROCEDURE — 97110 THERAPEUTIC EXERCISES: CPT

## 2018-02-22 NOTE — THERAPY TREATMENT NOTE
Outpatient Physical Therapy Ortho Treatment Note  [facility]     Patient Name: Patricia Palma  : 1965  MRN: [id number]  Today's Date: 2018      Subjective Improvement: 75%       Attendance: 10/10  Approved: 24 visits            MD follow up: 18            date: 18       Visit Date: 2018    Visit Dx:    ICD-10-CM ICD-9-CM   1. Adhesive capsulitis of left shoulder M75.02 726.0   2. Left shoulder pain, unspecified chronicity M25.512 719.41       Patient Active Problem List   Diagnosis   • Lower abdominal pain   • Change in stool caliber   • Diverticulosis   • Diverticulosis of large intestine without hemorrhage   • Adhesive capsulitis of left shoulder        Past Medical History:   Diagnosis Date   • Diverticulosis    • Shoulder pain, left         Past Surgical History:   Procedure Laterality Date   • APPENDECTOMY     •  SECTION      x2   • COLONOSCOPY      10 years   • SHOULDER MANIPULATION Left 2018             PT Ortho       18 1300    Subjective Comments    Subjective Comments Patient reports she didn't get to her stretches this AM but she did do 4 hours of work in her barn.   -NH    Precautions and Contraindications    Precautions/Limitations no known precautions/limitations  -NH    Contraindications s/p L shoulder manipulation 18  -NH      18 1400    Precautions and Contraindications    Precautions/Limitations no known precautions/limitations  -NH    Contraindications s/p L shoulder manipulation 18  -NH    Left Shoulder    Flexion AROM Deficit 165  -NH    Flexion PROM Deficit 165  -NH    ABduction AROM Deficit 164  -NH    ABduction PROM Deficit 160  -NH    External Rotation AROM Deficit 50; T2 functional reach  -NH    External Rotation PROM Deficit 65  -NH    Internal Rotation AROM Deficit L5 functional reach  -NH    Internal Rotation PROM Deficit 70  -NH    Left Shoulder    Flexion Gross Movement (4-/5) good minus  -NH     Extension Gross Movement (4+/5) good plus  -NH    ABduction Gross Movement (4-/5) good minus  -NH    Int Rotation Gross Movement (4/5) good  -NH    Ext Rotation Gross Movement (4-/5) good minus  -NH      User Key  (r) = Recorded By, (t) = Taken By, (c) = Cosigned By    Initials Name Provider Type    NH Jana Moreno, PT Physical Therapist                            PT Assessment/Plan       02/22/18 1300       PT Assessment    Functional Limitations Limitation in home management;Limitations in community activities;Performance in leisure activities;Performance in self-care ADL;Performance in work activities  -NH     Impairments Impaired flexibility;Joint mobility;Muscle strength;Pain;Posture;Range of motion  -NH     Assessment Comments Patient fatigued with exercises but overall tolerated well. Patient had difficulty with curl to press and used slight UT compeensation. Patient had improved impingment pain with PROM following GH mobs INF.   -NH     Rehab Potential Good  -NH     Patient/caregiver participated in establishment of treatment plan and goals Yes  -NH     Patient would benefit from skilled therapy intervention Yes  -NH     PT Plan    PT Frequency 2x/week  -NH     Predicted Duration of Therapy Intervention (days/wks) 1 week  -NH     PT Plan Comments Advance toward comprehensive HEP and discharge end of next week.   -NH       User Key  (r) = Recorded By, (t) = Taken By, (c) = Cosigned By    Initials Name Provider Type    NH Jana Moreno, PT Physical Therapist                Modalities       02/22/18 1300          Subjective Pain    Able to rate subjective pain? yes  -NH      Ice    Ice Applied Yes  -NH      Location L Shoulder  -NH      Rx Minutes 15 mins  -NH      Ice S/P Rx Yes  -NH      ELECTRICAL STIMULATION    Attended/Unattended Unattended  -NH      Stimulation Type IFC  -NH      Location/Electrode Placement/Other L Shoulder  -NH      Rx Minutes 15 mins  -NH        User Key  (r) = Recorded By, (t) =  Taken By, (c) = Cosigned By    Initials Name Provider Type    NH Jana Moreno, PT Physical Therapist                Exercises       02/22/18 1300          Subjective Comments    Subjective Comments Patient reports she didn't get to her stretches this AM but she did do 4 hours of work in her barn.   -NH      Subjective Pain    Able to rate subjective pain? yes  -NH      Pre-Treatment Pain Level 0  -NH      Aquatics    Aquatics performed? No  -NH      Exercise 1    Exercise Name 1 Pro II for ROM/endurance  -NH      Time (Minutes) 1 10 min  -NH      Additional Comments L5.0  -NH      Exercise 2    Exercise Name 2 Prone Row  -NH      Sets 2 2  -NH      Reps 2 10  -NH      Additional Comments 3# wt  -NH      Exercise 3    Exercise Name 3 Tband: mid rows  -NH      Sets 3 2  -NH      Reps 3 10  -NH      Additional Comments blue  -NH      Exercise 4    Exercise Name 4 Tband: shoulder ext  -NH      Sets 4 2  -NH      Reps 4 10  -NH      Additional Comments blue  -NH      Exercise 5    Exercise Name 5 Prone extensions   -NH      Sets 5 2  -NH      Reps 5 10  -NH      Additional Comments 2# DB  -NH      Exercise 6    Exercise Name 6 Prone horizontal abd  -NH      Sets 6 2  -NH      Reps 6 10  -NH      Exercise 7    Exercise Name 7 Doorway S  -NH      Reps 7 3  -NH      Time (Seconds) 7 30 sec hold  -NH      Exercise 8    Exercise Name 8 IR S w/ strap  -NH      Reps 8 3  -NH      Time (Seconds) 8 30 sec hold  -NH      Exercise 9    Exercise Name 9 Curl to press  -NH      Sets 9 1  -NH      Reps 9 10  -NH      Exercise 10    Exercise Name 10 Standing clocks  -NH      Sets 10 1  -NH      Reps 10 10  -NH      Additional Comments red   -NH      Exercise 11    Exercise Name 11 Mat table inclined push ups  -NH      Sets 11 2  -NH      Reps 11 10  -NH      Exercise 12    Exercise Name 12 PRE: FF/scap/abd  -NH      Sets 12 1  -NH      Reps 12 10  -NH      Additional Comments 1# DB  -NH      Exercise 13    Exercise Name 13 Sidelying  ER/Abd  -NH      Sets 13 2  -NH      Reps 13 10  -NH      Additional Comments 2# DB  -NH        User Key  (r) = Recorded By, (t) = Taken By, (c) = Cosigned By    Initials Name Provider Type    NH Jana Moreno, PT Physical Therapist                        Manual Rx (last 36 hours)      Manual Treatments       02/22/18 1300 02/21/18 1300       Manual Rx 1    Manual Rx 1 Location L Shoulder  -NH L Shoulder  -NH     Manual Rx 1 Type PROM all planes, GHJ mobes, KT tape for RTC tendonitis  -NH PROM all planes, GHJ mobes   -NH     Manual Rx 1 Duration 8 min  -NH 8 min  -NH       User Key  (r) = Recorded By, (t) = Taken By, (c) = Cosigned By    Initials Name Provider Type    NH Jana Moreno, PT Physical Therapist                PT OP Goals       02/22/18 1300       PT Short Term Goals    STG Date to Achieve 02/14/18  -NH     STG 1 Independent/compliant with [facility]  -NH     STG 1 Progress Met  -NH     STG 2 Tolerate 45 min treatment session without increased pain  -NH     STG 2 Progress Met  -NH     STG 3 Demosntrate L shoulder flex PROM to 160 deg or greater  -NH     STG 3 Progress Met  -NH     STG 4 Demonstrate L shoulder abd PROM to 150 deg or greater  -NH     STG 4 Progress Met  -NH     STG 5 Demonstrate L shoulder ER PROM to 55 deg or greater  -NH     STG 5 Progress Met  -NH     Long Term Goals    LTG Date to Achieve 02/28/18  -NH     LTG 1 Subjectively report 60% improvement or greater  -NH     LTG 1 Progress Met  -NH     LTG 2 QuickDASH score to 30% or less  -NH     LTG 2 Progress Progressing  -NH     LTG 3 Demonstrate L shoulder flex AROM to 150 deg or greater  -NH     LTG 3 Progress Progressing  -NH     LTG 4 Demonstrate L shoulder abd AROM to 145 deg or greater  -NH     LTG 4 Progress Progressing  -NH     LTG 5 Demonstrate L shoulder flex/abd MMT to 4+/5  -NH     LTG 5 Progress Progressing  -NH     LTG 6 Demonstrate L shoulder IR/ER MMT to 4+/5  -NH     LTG 6 Progress Progressing  -NH     LTG 7 Demonstrate L  shoulder ER AROM to 50 deg or greater  -NH     LTG 7 Progress Progressing  -NH     LTG 8 Demonstrate L IR functional reach to L4  -NH     LTG 8 Progress Progressing  -NH     Time Calculation    PT Goal Re-Cert Due Date 03/02/18  -NH       User Key  (r) = Recorded By, (t) = Taken By, (c) = Cosigned By    Initials Name Provider Type    NH Jana Moreno, PT Physical Therapist          Therapy Education  Given: HEP, Symptoms/condition management, Pain management, Posture/body mechanics  Program: Reinforced  How Provided: Verbal, Demonstration  Provided to: Patient  Level of Understanding: Verbalized, Demonstrated              Time Calculation:   Start Time: 1301  Stop Time: 1401  Time Calculation (min): 60 min  Total Timed Code Minutes- PT: 45 minute(s)    Therapy Charges for Today     Code Description Service Date Service Provider Modifiers Qty    86415033089 HC PT THER PROC EA 15 MIN 2/22/2018 Jana Moreno, PT GP 2    16403911233 HC PT MANUAL THERAPY EA 15 MIN 2/22/2018 Jana Moreno, PT GP 1    23526687490 HC PT ELECTRICAL STIM UNATTENDED 2/22/2018 Jana Moreno, PT  1    [id number]  PT THER SUPP EA 15 MIN 2/22/2018 Jana Moreno, PT GP 1                    Jana Moreno, PT  2/22/2018

## 2018-02-26 ENCOUNTER — HOSPITAL ENCOUNTER (OUTPATIENT)
Dept: PHYSICAL THERAPY | Facility: HOSPITAL | Age: 53
Setting detail: THERAPIES SERIES
Discharge: HOME | End: 2018-02-26

## 2018-02-26 DIAGNOSIS — M75.02 ADHESIVE CAPSULITIS OF LEFT SHOULDER: Primary | ICD-10-CM

## 2018-02-26 DIAGNOSIS — M25.512 LEFT SHOULDER PAIN, UNSPECIFIED CHRONICITY: ICD-10-CM

## 2018-02-26 PROCEDURE — 97140 MANUAL THERAPY 1/> REGIONS: CPT

## 2018-02-26 PROCEDURE — 97110 THERAPEUTIC EXERCISES: CPT

## 2018-02-26 NOTE — THERAPY TREATMENT NOTE
Outpatient Physical Therapy Ortho Treatment Note  [facility]  Sabi Berg PTA  18  1:49 PM       Patient Name: Patricia Palma  : 1965  MRN: [id number]  Today's Date: 2018      Visit Date: 2018    Subjective Improvement: 75%      Attendance:    Approved: 24 visits          MD follow up: 18            date: 18        Visit Dx:    ICD-10-CM ICD-9-CM   1. Adhesive capsulitis of left shoulder M75.02 726.0   2. Left shoulder pain, unspecified chronicity M25.512 719.41       Patient Active Problem List   Diagnosis   • Lower abdominal pain   • Change in stool caliber   • Diverticulosis   • Diverticulosis of large intestine without hemorrhage   • Adhesive capsulitis of left shoulder        Past Medical History:   Diagnosis Date   • Diverticulosis    • Shoulder pain, left         Past Surgical History:   Procedure Laterality Date   • APPENDECTOMY     •  SECTION      x2   • COLONOSCOPY      10 years   • SHOULDER MANIPULATION Left 2018             PT Ortho       18 1300    Subjective Comments    Subjective Comments pt reports that she is doing well today. No pain at present  -    Precautions and Contraindications    Precautions/Limitations no known precautions/limitations  -    Contraindications s/p L shoulder manipulation 18  -    Subjective Pain    Able to rate subjective pain? yes  -ALIZE    Pre-Treatment Pain Level 0  -ALIZE    Post-Treatment Pain Level 0  -ALIZE      User Key  (r) = Recorded By, (t) = Taken By, (c) = Cosigned By    Initials Name Provider Type     Sabi Berg PTA Physical Therapy Assistant                            PT Assessment/Plan       18 1300       PT Assessment    Functional Limitations Limitation in home management;Limitations in community activities;Performance in leisure activities;Performance in self-care ADL;Performance in work activities  -     Impairments Impaired flexibility;Joint  mobility;Muscle strength;Pain;Posture;Range of motion  -ALIZE     Assessment Comments pt did well with introduction to cybex. Good effort overall  -ALIZE     Rehab Potential Good  -ALIZE     Patient/caregiver participated in establishment of treatment plan and goals Yes  -ALIZE     Patient would benefit from skilled therapy intervention Yes  -ALIZE     PT Plan    PT Frequency 2x/week  -ALIZE     Predicted Duration of Therapy Intervention (days/wks) 1 week  -ALIZE     PT Plan Comments D/C next visit with free 30 day fitness  -ALIZE       User Key  (r) = Recorded By, (t) = Taken By, (c) = Cosigned By    Initials Name Provider Type    ALIZE Berg PTA Physical Therapy Assistant                Modalities       02/26/18 1300          Ice    Ice Applied Yes  -ALIZE      Location L Shoulder  -ALIZE      Rx Minutes 15 mins  -ALIZE      Ice S/P Rx Yes  -ALIZE      ELECTRICAL STIMULATION    Attended/Unattended Unattended  -ALIZE      Stimulation Type IFC  -ALIZE      Location/Electrode Placement/Other L Shoulder  -ALIZE      Rx Minutes 15 mins  -ALIZE        User Key  (r) = Recorded By, (t) = Taken By, (c) = Cosigned By    Initials Name Provider Type    ALIZE Berg PTA Physical Therapy Assistant                Exercises       02/26/18 1300          Subjective Comments    Subjective Comments pt reports that she is doing well today. No pain at present  -      Subjective Pain    Able to rate subjective pain? yes  -ALIZE      Pre-Treatment Pain Level 0  -ALIZE      Post-Treatment Pain Level 0  -ALIZE      Aquatics    Aquatics performed? No  -ALIZE      Exercise 1    Exercise Name 1 Pro II for ROM/endurance  -      Time (Minutes) 1 10 min  -ALIZE      Exercise 2    Exercise Name 2 Prone Row  -ALIZE      Sets 2 2  -ALIZE      Reps 2 10  -ALIZE      Exercise 3    Exercise Name 3 Cybex: mid rows  -ALIZE      Sets 3 2  -ALIZE      Reps 3 10  -ALIZE      Additional Comments 40#  -ALIZE      Exercise 4    Exercise Name 4 Cybex: Incline pull  -ALIZE      Sets 4 2  -ALIZE      Reps 4 10  -ALIZE       Additional Comments 40#  -ALIZE      Exercise 5    Exercise Name 5 Prone extensions   -ALIZE      Sets 5 2  -ALIZE      Reps 5 10  -ALIZE      Exercise 6    Exercise Name 6 Prone horizontal abd  -ALIZE      Sets 6 2  -ALIZE      Reps 6 10  -ALIZE      Exercise 7    Exercise Name 7 Doorway S  -ALIZE      Reps 7 3  -ALIZE      Time (Seconds) 7 30 sec hold  -ALIZE      Exercise 8    Exercise Name 8 IR S w/ strap  -ALIZE      Reps 8 3  -ALIZE      Time (Seconds) 8 30 sec hold  -ALIZE      Exercise 9    Exercise Name 9 Curl to press  -ALIZE      Sets 9 1  -ALIZE      Reps 9 10  -ALIZE      Exercise 10    Exercise Name 10 Standing clocks  -ALIZE      Sets 10 1  -ALIZE      Reps 10 10  -ALIZE      Exercise 11    Exercise Name 11 Mat table inclined push ups  -ALIZE      Sets 11 2  -ALIZE      Reps 11 10  -ALIZE      Exercise 12    Exercise Name 12 PRE: FF/scap/abd  -ALIZE      Sets 12 1  -ALIZE      Reps 12 10  -ALIZE      Exercise 13    Exercise Name 13 Sidelying ER/Abd  -ALIZE      Sets 13 2  -ALIZE      Reps 13 10  -ALIZE      Additional Comments 2# DB  -ALIZE      Exercise 14    Exercise Name 14 Cybex: rear delt  -ALIZE      Sets 14 2  -ALIZE      Reps 14 10  -ALIZE      Additional Comments 40#  -ALIZE        User Key  (r) = Recorded By, (t) = Taken By, (c) = Cosigned By    Initials Name Provider Type    ALIZE Berg PTA Physical Therapy Assistant                        Manual Rx (last 36 hours)      Manual Treatments       02/26/18 1300          Manual Rx 1    Manual Rx 1 Location L Shoulder  -ALIZE      Manual Rx 1 Type PROM all planes, GHJ mobes, KT tape for RTC tendonitis  -ALIZE      Manual Rx 1 Duration 8 min  -ALIZE        User Key  (r) = Recorded By, (t) = Taken By, (c) = Cosigned By    Initials Name Provider Type    ALIZE Berg PTA Physical Therapy Assistant                PT OP Goals       02/26/18 1300       PT Short Term Goals    STG Date to Achieve 02/14/18  -ALIZE     STG 1 Independent/compliant with HEP  -ALIZE     STG 1 Progress Met  -ALIZE     STG 2 Tolerate 45 min treatment session without  increased pain  -     STG 2 Progress Met  -     STG 3 Demosntrate L shoulder flex PROM to 160 deg or greater  -     STG 3 Progress Met  -     STG 4 Demonstrate L shoulder abd PROM to 150 deg or greater  -     STG 4 Progress Met  -     STG 5 Demonstrate L shoulder ER PROM to 55 deg or greater  -     STG 5 Progress Met  -     Long Term Goals    LTG Date to Achieve 02/28/18  -     LTG 1 Subjectively report 60% improvement or greater  -     LTG 1 Progress Met  -     LTG 2 QuickDASH score to 30% or less  -     LTG 2 Progress Progressing  -     LTG 3 Demonstrate L shoulder flex AROM to 150 deg or greater  -     LTG 3 Progress Progressing  -     LTG 4 Demonstrate L shoulder abd AROM to 145 deg or greater  -     LTG 4 Progress Progressing  -     LTG 5 Demonstrate L shoulder flex/abd MMT to 4+/5  -ALIZE     LTG 5 Progress Progressing  -     LTG 6 Demonstrate L shoulder IR/ER MMT to 4+/5  -ALIZE     LTG 6 Progress Progressing  -     LTG 7 Demonstrate L shoulder ER AROM to 50 deg or greater  -     LTG 7 Progress Progressing  -     LTG 8 Demonstrate L IR functional reach to L4  -     LTG 8 Progress Progressing  -     Time Calculation    PT Goal Re-Cert Due Date 03/02/18  -       User Key  (r) = Recorded By, (t) = Taken By, (c) = Cosigned By    Initials Name Provider Type    ALIZE Berg PTA Physical Therapy Assistant          Therapy Education  Given: HEP, Symptoms/condition management, Pain management, Posture/body mechanics  Program: Reinforced  How Provided: Verbal, Demonstration  Provided to: Patient  Level of Understanding: Verbalized, Demonstrated              Time Calculation:   Start Time: 1300  Stop Time: 1345  Time Calculation (min): 45 min  Total Timed Code Minutes- PT: 45 minute(s)    Therapy Charges for Today     Code Description Service Date Service Provider Modifiers Qty    90500503424 HC PT THER PROC EA 15 MIN 2/26/2018 Sabi Berg PTA GP 2     [id number]  PT MANUAL THERAPY EA 15 MIN 2/26/2018 Sabi Berg, PTA GP 1                    Sabi Berg, PTA  2/26/2018

## 2018-03-01 ENCOUNTER — HOSPITAL ENCOUNTER (OUTPATIENT)
Dept: PHYSICAL THERAPY | Facility: HOSPITAL | Age: 53
Setting detail: THERAPIES SERIES
Discharge: HOME | End: 2018-03-01

## 2018-03-01 DIAGNOSIS — M25.512 LEFT SHOULDER PAIN, UNSPECIFIED CHRONICITY: ICD-10-CM

## 2018-03-01 DIAGNOSIS — M75.02 ADHESIVE CAPSULITIS OF LEFT SHOULDER: Primary | ICD-10-CM

## 2018-03-01 PROCEDURE — 97110 THERAPEUTIC EXERCISES: CPT

## 2018-03-01 PROCEDURE — 97140 MANUAL THERAPY 1/> REGIONS: CPT

## 2018-03-01 PROCEDURE — G0283 ELEC STIM OTHER THAN WOUND: HCPCS

## 2018-03-01 NOTE — THERAPY DISCHARGE NOTE
Outpatient Physical Therapy Ortho Treatment Note/Discharge Summary  [facility]  Sabi Berg PTA  18  2:13 PM       Patient Name: Patricia Palma  : 1965  MRN: [id number]  Today's Date: 3/1/2018      Visit Date: 2018    Subjective Improvement: 75%      Attendance:    Approved: 24 visits          MD follow up: 18            date: 18         Visit Dx:    ICD-10-CM ICD-9-CM   1. Adhesive capsulitis of left shoulder M75.02 726.0   2. Left shoulder pain, unspecified chronicity M25.512 719.41       Patient Active Problem List   Diagnosis   • Lower abdominal pain   • Change in stool caliber   • Diverticulosis   • Diverticulosis of large intestine without hemorrhage   • Adhesive capsulitis of left shoulder        Past Medical History:   Diagnosis Date   • Diverticulosis    • Shoulder pain, left         Past Surgical History:   Procedure Laterality Date   • APPENDECTOMY     •  SECTION      x2   • COLONOSCOPY      10 years   • SHOULDER MANIPULATION Left 2018             PT Ortho       18 1200    Precautions and Contraindications    Precautions/Limitations no known precautions/limitations  -    Contraindications s/p L shoulder manipulation 18  -ALIZE    Left Shoulder    Flexion AROM Deficit 165  -ALIZE    ABduction AROM Deficit 165  -ALIZE    External Rotation AROM Deficit 68  -ALIZE      User Key  (r) = Recorded By, (t) = Taken By, (c) = Cosigned By    Initials Name Provider Type     Sabi Berg PTA Physical Therapy Assistant                            PT Assessment/Plan       18 1300       PT Assessment    Functional Limitations Limitation in home management;Limitations in community activities;Performance in leisure activities;Performance in self-care ADL;Performance in work activities  -     Impairments Impaired flexibility;Joint mobility;Muscle strength;Pain;Posture;Range of motion  -     Assessment Comments pt able to  maintain measurements today and able to meet all STGs and LTGs. Good overall progress.  -ALIZE     Rehab Potential Good  -ALIZE     Patient/caregiver participated in establishment of treatment plan and goals Yes  -ALIZE     Patient would benefit from skilled therapy intervention Yes  -ALIZE     PT Plan    PT Plan Comments D/C to I management and free 30 day fitness.  -ALIZE       User Key  (r) = Recorded By, (t) = Taken By, (c) = Cosigned By    Initials Name Provider Type    ALIZE Berg PTA Physical Therapy Assistant                Modalities       03/01/18 1200          Ice    Ice Applied Yes  -ALIZE      Location L Shoulder  -ALIZE      Rx Minutes 15 mins  -ALIZE      Ice S/P Rx Yes  -ALIZE      ELECTRICAL STIMULATION    Attended/Unattended Unattended  -ALIZE      Stimulation Type IFC  -ALIZE      Location/Electrode Placement/Other L Shoulder  -ALIZE      Rx Minutes 15 mins  -ALIZE        User Key  (r) = Recorded By, (t) = Taken By, (c) = Cosigned By    Initials Name Provider Type    ALIZE Berg PTA Physical Therapy Assistant                Exercises       03/01/18 1200          Subjective Comments    Subjective Comments pt reports that the MD canceled her appt today and that her MD states if she is feeling good she does not have to reschedule.  -ALIZE      Subjective Pain    Able to rate subjective pain? yes  -ALIZE      Pre-Treatment Pain Level 0  -ALIZE      Post-Treatment Pain Level 0  -ALIZE      Aquatics    Aquatics performed? No  -ALIZE      Exercise 1    Exercise Name 1 Pro II for ROM/endurance  -ALIZE      Time (Minutes) 1 10 min  -ALIZE      Exercise 2    Exercise Name 2 Prone Row  -ALIZE      Sets 2 2  -ALIZE      Reps 2 10  -ALIZE      Exercise 3    Exercise Name 3 Cybex: mid rows  -ALIZE      Sets 3 2  -ALIZE      Reps 3 10  -ALIZE      Additional Comments 40#  -ALIZE      Exercise 4    Exercise Name 4 Cybex: Incline pull  -ALIZE      Sets 4 2  -ALIZE      Reps 4 10  -ALIZE      Additional Comments 40#  -ALIZE      Exercise 5    Exercise Name 5 Prone extensions   -ALIZE       Sets 5 2  -ALIZE      Reps 5 10  -ALIZE      Exercise 6    Exercise Name 6 Prone horizontal abd  -ALIZE      Sets 6 2  -ALIZE      Reps 6 10  -ALIZE      Exercise 7    Exercise Name 7 Doorway S  -ALIZE      Reps 7 3  -ALIZE      Time (Seconds) 7 30 sec hold  -ALIZE      Exercise 8    Exercise Name 8 IR S w/ strap  -ALIZE      Reps 8 3  -ALIZE      Time (Seconds) 8 30 sec hold  -ALIZE      Exercise 9    Exercise Name 9 Curl to press  -ALIZE      Sets 9 2  -ALIZE      Reps 9 10  -ALIZE      Exercise 10    Exercise Name 10 Standing clocks  -ALIZE      Sets 10 1  -ALIZE      Reps 10 10  -ALIZE      Additional Comments red  -ALIZE      Exercise 11    Exercise Name 11 Mat table inclined push ups  -ALIZE      Sets 11 2  -ALIZE      Reps 11 10  -ALIZE      Exercise 12    Exercise Name 12 PRE: FF/scap/abd  -ALIZE      Sets 12 1  -ALIZE      Reps 12 10  -ALIZE      Additional Comments 1# db  -ALIZE      Exercise 13    Exercise Name 13 Sidelying ER/Abd  -ALIZE      Sets 13 2  -ALIZE      Reps 13 10  -ALIZE      Exercise 14    Exercise Name 14 Cybex: rear delt  -ALIZE      Sets 14 2  -ALIZE      Reps 14 10  -ALIZE      Additional Comments 40#  -ALIZE        User Key  (r) = Recorded By, (t) = Taken By, (c) = Cosigned By    Initials Name Provider Type    ALIZE Berg PTA Physical Therapy Assistant                        Manual Rx (last 36 hours)      Manual Treatments       03/01/18 1300          Manual Rx 1    Manual Rx 1 Location L Shoulder  -ALIZE      Manual Rx 1 Type PROM all planes, GHJ mobes, KT tape for RTC tendonitis  -ALIZE      Manual Rx 1 Duration 8 min  -ALIZE        User Key  (r) = Recorded By, (t) = Taken By, (c) = Cosigned By    Initials Name Provider Type    ALIZE Berg PTA Physical Therapy Assistant                PT OP Goals       03/01/18 1300       PT Short Term Goals    STG Date to Achieve 02/14/18  -ALIZE     STG 1 Independent/compliant with HEP  -ALIZE     STG 1 Progress Met  -ALIZE     STG 2 Tolerate 45 min treatment session without increased pain  -ALIZE     STG 2 Progress Met  -ALIZE     STG 3  Demosntrate L shoulder flex PROM to 160 deg or greater  -     STG 3 Progress [facility]     STG 4 Demonstrate L shoulder abd PROM to 150 deg or greater  -     STG 4 Progress [facility]     STG 5 Demonstrate L shoulder ER PROM to 55 deg or greater  -     STG 5 Progress [facility]     Long Term Goals    LTG Date to Achieve 02/28/18  -     LTG 1 Subjectively report 60% improvement or greater  -     LTG 1 Progress Met  -ALIZE     LTG 2 QuickDASH score to 30% or less  -     LTG 2 Progress [facility]     LTG 3 Demonstrate L shoulder flex AROM to 150 deg or greater  -     LTG 3 Progress [facility]     LTG 4 Demonstrate L shoulder abd AROM to 145 deg or greater  -     LTG 4 Progress [facility]     LTG 5 Demonstrate L shoulder flex/abd MMT to 4+/5  -ALIZE     LTG 5 Progress [facility]     LTG 6 Demonstrate L shoulder IR/ER MMT to 4+/5  -ALIZE     LTG 6 Progress [facility]     LTG 7 Demonstrate L shoulder ER AROM to 50 deg or greater  -     LTG 7 Progress [facility]     LTG 8 Demonstrate L IR functional reach to L4  -     LT 8 Progress [facility]     Time Calculation    PT Goal Re-Cert Due Date 03/02/18  -       User Key  (r) = Recorded By, (t) = Taken By, (c) = Cosigned By    Initials Name Provider Type    ALIZE Berg PTA Physical Therapy Assistant          Therapy Education  Given: HEP, Symptoms/condition management, Pain management, Posture/body mechanics  Program: Reinforced  How Provided: Verbal, Demonstration  Provided to: Patient  Level of Understanding: Verbalized, Demonstrated    Outcome Measure Options: Quick DASH  Quick DASH  Open a tight or new jar.: Mild Difficulty  Do heavy household chores (e.g., wash walls, wash floors): No Difficulty  Carry a shopping bag or briefcase: No Difficulty  Wash your back: Mild Difficulty  Use a knife to cut food: No Difficulty  Recreational activities in which you take some force or impact through your arm, should or hand (e.g. golf, hammering, tennis, etc.): Mild  Difficulty  During the past week, to what extent has your arm, shoulder, or hand problem interfered with your normal social activites with family, friends, neighbors or groups?: Not at all  During the past week, were you limited in your work or other regular daily activities as a result of your arm, shoulder or hand problem?: Slightly Limited  Arm, Shoulder, or hand pain: Mild  Tingling (pins and needles) in your arm, shoulder, or hand: None  During the past week, how much difficulty have you had sleeping because of the pain in your arm, shoulder or hand?: No difficulty  Number of Questions Answered: 11  Quick DASH Score: 11.36         Time Calculation:   Start Time: 1300  Stop Time: 1410  Time Calculation (min): 70 min  Total Timed Code Minutes- PT: 55 minute(s)    Therapy Charges for Today     Code Description Service Date Service Provider Modifiers Qty    03387059139 HC PT THER PROC EA 15 MIN 3/1/2018 Sabi Berg, PTA GP 3    38899836991 HC PT MANUAL THERAPY EA 15 MIN 3/1/2018 Sabi Berg, PTA GP 1    97379745355 HC PT ELECTRICAL STIM UNATTENDED 3/1/2018 Sabi Berg, PTA  1    98336817362 HC PT THER SUPP EA 15 MIN 3/1/2018 Sabi Berg, PTA GP 1          PT G-Codes  Outcome Measure Options: Quick DASH     OP PT Discharge Summary  Date of Discharge: 03/01/18  Reason for Discharge: All goals achieved  Outcomes Achieved: Able to achieve all goals within established timeline  Discharge Destination: Home with home program  Discharge Instructions: HEP established and free 30 day fitness       Sabi Berg PTA  3/1/2018

## 2018-03-08 ENCOUNTER — ANESTHESIA (OUTPATIENT)
Dept: GASTROENTEROLOGY | Facility: HOSPITAL | Age: 53
End: 2018-03-08

## 2018-03-08 ENCOUNTER — ANESTHESIA EVENT (OUTPATIENT)
Dept: GASTROENTEROLOGY | Facility: HOSPITAL | Age: 53
End: 2018-03-08

## 2018-03-08 ENCOUNTER — HOSPITAL ENCOUNTER (OUTPATIENT)
Facility: HOSPITAL | Age: 53
Setting detail: HOSPITAL OUTPATIENT SURGERY
Discharge: HOME | End: 2018-03-08
Attending: INTERNAL MEDICINE | Admitting: INTERNAL MEDICINE

## 2018-03-08 VITALS
WEIGHT: 169 LBS | HEIGHT: 68 IN | HEART RATE: 73 BPM | BODY MASS INDEX: 25.61 KG/M2 | DIASTOLIC BLOOD PRESSURE: 62 MMHG | RESPIRATION RATE: 16 BRPM | SYSTOLIC BLOOD PRESSURE: 108 MMHG | TEMPERATURE: 97 F | OXYGEN SATURATION: 98 %

## 2018-03-08 DIAGNOSIS — R19.5 CHANGE IN STOOL CALIBER: ICD-10-CM

## 2018-03-08 DIAGNOSIS — K57.30 DIVERTICULOSIS OF LARGE INTESTINE WITHOUT HEMORRHAGE: ICD-10-CM

## 2018-03-08 DIAGNOSIS — R10.30 LOWER ABDOMINAL PAIN: ICD-10-CM

## 2018-03-08 PROBLEM — K63.5 COLON POLYPS: Status: ACTIVE | Noted: 2018-03-08

## 2018-03-08 PROCEDURE — 45380 COLONOSCOPY AND BIOPSY: CPT | Performed by: INTERNAL MEDICINE

## 2018-03-08 PROCEDURE — 45385 COLONOSCOPY W/LESION REMOVAL: CPT | Performed by: INTERNAL MEDICINE

## 2018-03-08 PROCEDURE — 25010000002 PROPOFOL 10 MG/ML EMULSION: Performed by: NURSE ANESTHETIST, CERTIFIED REGISTERED

## 2018-03-08 PROCEDURE — 88305 TISSUE EXAM BY PATHOLOGIST: CPT | Performed by: INTERNAL MEDICINE

## 2018-03-08 RX ORDER — SODIUM CHLORIDE 9 MG/ML
500 INJECTION, SOLUTION INTRAVENOUS CONTINUOUS PRN
Status: DISCONTINUED | OUTPATIENT
Start: 2018-03-08 | End: 2018-03-08 | Stop reason: HOSPADM

## 2018-03-08 RX ORDER — SODIUM CHLORIDE 0.9 % (FLUSH) 0.9 %
3 SYRINGE (ML) INJECTION AS NEEDED
Status: DISCONTINUED | OUTPATIENT
Start: 2018-03-08 | End: 2018-03-08 | Stop reason: HOSPADM

## 2018-03-08 RX ORDER — PROPOFOL 10 MG/ML
VIAL (ML) INTRAVENOUS AS NEEDED
Status: DISCONTINUED | OUTPATIENT
Start: 2018-03-08 | End: 2018-03-08 | Stop reason: SURG

## 2018-03-08 RX ORDER — LIDOCAINE HYDROCHLORIDE 20 MG/ML
INJECTION, SOLUTION INFILTRATION; PERINEURAL AS NEEDED
Status: DISCONTINUED | OUTPATIENT
Start: 2018-03-08 | End: 2018-03-08 | Stop reason: SURG

## 2018-03-08 RX ADMIN — PROPOFOL 50 MG: 10 INJECTION, EMULSION INTRAVENOUS at 09:45

## 2018-03-08 RX ADMIN — SODIUM CHLORIDE 500 ML: 9 INJECTION, SOLUTION INTRAVENOUS at 09:00

## 2018-03-08 RX ADMIN — PROPOFOL 30 MG: 10 INJECTION, EMULSION INTRAVENOUS at 09:56

## 2018-03-08 RX ADMIN — LIDOCAINE HYDROCHLORIDE 40 MG: 20 INJECTION, SOLUTION INFILTRATION; PERINEURAL at 09:43

## 2018-03-08 RX ADMIN — PROPOFOL 50 MG: 10 INJECTION, EMULSION INTRAVENOUS at 09:53

## 2018-03-08 RX ADMIN — PROPOFOL 50 MG: 10 INJECTION, EMULSION INTRAVENOUS at 09:44

## 2018-03-08 RX ADMIN — PROPOFOL 50 MG: 10 INJECTION, EMULSION INTRAVENOUS at 09:50

## 2018-03-08 RX ADMIN — LIDOCAINE HYDROCHLORIDE 0.5 ML: 10 INJECTION, SOLUTION EPIDURAL; INFILTRATION; INTRACAUDAL; PERINEURAL at 09:00

## 2018-03-08 RX ADMIN — PROPOFOL 50 MG: 10 INJECTION, EMULSION INTRAVENOUS at 09:43

## 2018-03-08 RX ADMIN — PROPOFOL 50 MG: 10 INJECTION, EMULSION INTRAVENOUS at 09:47

## 2018-03-08 NOTE — ANESTHESIA POSTPROCEDURE EVALUATION
Patient: Patricia Palma    Procedure Summary     Date Anesthesia Start Anesthesia Stop Room / Location    03/08/18 0939 1003  [facility] /  [facility]       Procedure Diagnosis Surgeon Provider    COLONOSCOPY WITH ANESTHESIA (N/A ) Lower abdominal pain; Diverticulosis of large intestine without hemorrhage; Change in stool caliber  (Lower abdominal pain [R10.30]; Diverticulosis of large intestine without hemorrhage [K57.30]; Change in stool caliber [R19.4]) MD Tigre Rosario CRNA          Anesthesia Type: general  Last vitals  BP   109/60 (03/08/18 1020)   Temp   97 °F (36.1 °C) (03/08/18 0841)   Pulse   69 (03/08/18 1020)   Resp   14 (03/08/18 1020)     SpO2   99 % (03/08/18 1020)     Post Anesthesia Care and Evaluation    Patient location during evaluation: PHASE II  Patient participation: complete - patient participated  Level of consciousness: awake  Pain score: 0  Pain management: adequate  Airway patency: patent  Anesthetic complications: No anesthetic complications  PONV Status: none  Cardiovascular status: acceptable  Respiratory status: acceptable  Hydration status: acceptable  No anesthesia care post op

## 2018-03-08 NOTE — PLAN OF CARE
Problem: Patient Care Overview (Adult)  Goal: Plan of Care Review  Outcome: Ongoing (interventions implemented as appropriate)   03/08/18 0975   Coping/Psychosocial Response Interventions   Plan Of Care Reviewed With patient   Patient Care Overview   Progress progress toward functional goals as expected   Outcome Evaluation   Outcome Summary/Follow up Plan tolerating procedure well

## 2018-03-08 NOTE — H&P
"    Chief Complaint:   Abdominal pain and change in stool caliber    Subjective     HPI:   The patient has movements late last year associated abdominal pain and change in bowel pattern.  She went these couple rounds of antibiotics and ultimately improved.  Family history is negative for colon cancer colon polyps.  She has had no prior colorectal evaluations.    Past Medical History:   Past Medical History:   Diagnosis Date   • Diverticulosis    • Shoulder pain, left        Past Surgical History:  Past Surgical History:   Procedure Laterality Date   • APPENDECTOMY     •  SECTION      x2   • COLONOSCOPY      10 years   • SHOULDER MANIPULATION Left 2018        Family History:  Family History   Problem Relation Age of Onset   • No Known Problems Mother    • No Known Problems Sister    • No Known Problems Brother    • Colon cancer Neg Hx    • Colon polyps Neg Hx        Social History:   reports that she has never smoked. She has never used smokeless tobacco. She reports that she does not drink alcohol or use illicit drugs.    Medications:   Prescriptions Prior to Admission   Medication Sig Dispense Refill Last Dose   • Acetaminophen (TYLENOL EXTRA STRENGTH PO) Take 2 tablets by mouth As Needed (for shoulder pain).   Past Week at Unknown time   • Estradiol-Norethindrone Acet 0.5-0.1 MG per tablet Take 1 tablet by mouth Daily. 90 tablet 3 3/7/2018 at Unknown time   • dicyclomine (BENTYL) 10 MG capsule 10 mg As Needed.   More than a month at Unknown time       Allergies:  Review of patient's allergies indicates no known allergies.    ROS:    General: Weight stable  Resp: No SOA  Cardiovascular: No CP      Objective     /68 (BP Location: Right arm, Patient Position: Sitting)  Pulse 78  Temp 97 °F (36.1 °C) (Temporal Artery )   Resp 14  Ht 172.7 cm (68\")  Wt 76.7 kg (169 lb)  SpO2 99%  BMI 25.7 kg/m2    Physical Exam   Constitutional: Pt is oriented to person, place, and in no distress.  Eyes: No " icterus  ENMT: Unremarkable   Cardiovascular: Normal rate, regular rhythm.    Pulmonary/Chest: No distress.  No audible wheezes  Abdominal: Soft.   Skin: Skin is warm and dry.   Psychiatric: Mood, memory, affect and judgment appear normal.     Assessment/Plan     Diagnosis:  Abdominal pain  Change in bowel pattern    Anticipated Surgical Procedure:  Colonoscopy    The risks, benefits, and alternatives of colonoscopy were reviewed with the patient today.  Risks including perforation of the colon possibly requiring surgery or colostomy.  Additional risks include risk of bleeding from biopsies or removal of colon tissue.  There is also the risk of a drug reaction or problems with anesthesia.  This will be discussed with the further by the anesthesia team on the day of the procedure.  Lastly there is a possibility of missing a colon polyp or cancer.  The benefits include the diagnosis and management of disease of the colon and rectum.  Alternatives to colonoscopy include barium enema, laboratory testing, radiographic evaluation, or no intervention.  The patient verbalizes understanding and agrees.    Much of this encounter note is an electronic transcription/translation of spoken language to printed text. The electronic translation of spoken language may permit erroneous, or at times, nonsensical words or phrases to be inadvertently transcribed; although I have reviewed the note for such errors, some may still exist.

## 2018-03-08 NOTE — PLAN OF CARE
Problem: GI Endoscopy (Adult)  Goal: Signs and Symptoms of Listed Potential Problems Will be Absent or Manageable (GI Endoscopy)  Outcome: Outcome(s) achieved Date Met: 03/08/18 03/08/18 1005   GI Endoscopy   Problems Assessed (GI Endoscopy) all   Problems Present (GI Endoscopy) none

## 2018-03-08 NOTE — ANESTHESIA PREPROCEDURE EVALUATION
Anesthesia Evaluation     Patient summary reviewed   no history of anesthetic complications:  NPO Solid Status: > 8 hours  NPO Liquid Status: > 2 hours           Airway   Mallampati: I  TM distance: >3 FB  Neck ROM: full  Dental - normal exam         Pulmonary - negative pulmonary ROS   Cardiovascular - negative cardio ROS  Exercise tolerance: good (4-7 METS)        Neuro/Psych- negative ROS  GI/Hepatic/Renal/Endo - negative ROS     ROS Comment: Diverticulosis    Musculoskeletal     Abdominal    Substance History      OB/GYN          Other                        Anesthesia Plan    ASA 1     general   total IV anesthesia  intravenous induction   Anesthetic plan and risks discussed with patient.

## 2018-03-08 NOTE — PLAN OF CARE
Problem: Patient Care Overview (Adult)  Goal: Plan of Care Review  Outcome: Outcome(s) achieved Date Met: 03/08/18

## 2018-03-08 NOTE — PLAN OF CARE
Problem: GI Endoscopy (Adult)  Goal: Signs and Symptoms of Listed Potential Problems Will be Absent or Manageable (GI Endoscopy)  Outcome: Ongoing (interventions implemented as appropriate)   03/08/18 09   GI Endoscopy   Problems Assessed (GI Endoscopy) all   Problems Present (GI Endoscopy) none

## 2018-03-10 LAB
CYTO UR: NORMAL
LAB AP CASE REPORT: NORMAL
LAB AP CLINICAL INFORMATION: NORMAL
Lab: NORMAL
PATH REPORT.FINAL DX SPEC: NORMAL
PATH REPORT.GROSS SPEC: NORMAL

## 2018-03-12 ENCOUNTER — TELEPHONE (OUTPATIENT)
Dept: GASTROENTEROLOGY | Facility: CLINIC | Age: 53
End: 2018-03-12

## 2018-03-12 NOTE — TELEPHONE ENCOUNTER
----- Message from Natalie Cruz MD sent at 3/12/2018  1:19 PM CDT -----  I am writing to inform you that your colon biopsies were normal.  Neither of the small polypoid lesions were actually polyps.  As such, you will need a repeat colonoscopy in 10 years time.    If you have any questions, please call our office.      Sincerely,        Natalie Cruz MD

## 2018-03-13 DIAGNOSIS — Z12.31 VISIT FOR SCREENING MAMMOGRAM: Primary | ICD-10-CM

## 2018-03-13 NOTE — PROGRESS NOTES
Outpatient imaging is unable to schedule Mammo ordered 10/18/17 they say they cant see order. New order will have to be put in.

## 2018-03-15 ENCOUNTER — HOSPITAL ENCOUNTER (OUTPATIENT)
Dept: MAMMOGRAPHY | Facility: HOSPITAL | Age: 53
Discharge: HOME | End: 2018-03-15
Attending: OBSTETRICS & GYNECOLOGY | Admitting: OBSTETRICS & GYNECOLOGY

## 2018-03-15 DIAGNOSIS — Z12.31 VISIT FOR SCREENING MAMMOGRAM: ICD-10-CM

## 2018-03-15 PROCEDURE — 77067 SCR MAMMO BI INCL CAD: CPT

## 2018-03-15 PROCEDURE — 77063 BREAST TOMOSYNTHESIS BI: CPT

## 2018-10-03 DIAGNOSIS — Z78.0 MENOPAUSE: ICD-10-CM

## 2018-10-03 RX ORDER — ESTRADIOL AND NORETHINDRONE ACETATE .5; .1 MG/1; MG/1
TABLET ORAL
Qty: 84 TABLET | Refills: 3 | OUTPATIENT
Start: 2018-10-03

## 2018-11-09 DIAGNOSIS — Z78.0 MENOPAUSE: ICD-10-CM

## 2018-11-09 RX ORDER — ESTRADIOL AND NORETHINDRONE ACETATE .5; .1 MG/1; MG/1
1 TABLET ORAL DAILY
Qty: 30 TABLET | Refills: 0 | Status: SHIPPED | OUTPATIENT
Start: 2018-11-09 | End: 2018-11-30 | Stop reason: SDUPTHER

## 2018-11-30 ENCOUNTER — OFFICE VISIT (OUTPATIENT)
Dept: OBSTETRICS AND GYNECOLOGY | Facility: CLINIC | Age: 53
End: 2018-11-30

## 2018-11-30 VITALS
WEIGHT: 171 LBS | DIASTOLIC BLOOD PRESSURE: 64 MMHG | SYSTOLIC BLOOD PRESSURE: 114 MMHG | HEIGHT: 68 IN | BODY MASS INDEX: 25.91 KG/M2

## 2018-11-30 DIAGNOSIS — Z78.0 MENOPAUSE: ICD-10-CM

## 2018-11-30 DIAGNOSIS — Z78.9 NON-SMOKER: ICD-10-CM

## 2018-11-30 DIAGNOSIS — Z12.31 ENCOUNTER FOR SCREENING MAMMOGRAM FOR MALIGNANT NEOPLASM OF BREAST: ICD-10-CM

## 2018-11-30 DIAGNOSIS — N95.1 VAGINAL DRYNESS, MENOPAUSAL: ICD-10-CM

## 2018-11-30 DIAGNOSIS — Z01.419 ENCOUNTER FOR GYNECOLOGICAL EXAMINATION WITHOUT ABNORMAL FINDING: Primary | ICD-10-CM

## 2018-11-30 PROCEDURE — 87624 HPV HI-RISK TYP POOLED RSLT: CPT | Performed by: NURSE PRACTITIONER

## 2018-11-30 PROCEDURE — 99396 PREV VISIT EST AGE 40-64: CPT | Performed by: NURSE PRACTITIONER

## 2018-11-30 PROCEDURE — G0123 SCREEN CERV/VAG THIN LAYER: HCPCS | Performed by: NURSE PRACTITIONER

## 2018-11-30 RX ORDER — ESTRADIOL AND NORETHINDRONE ACETATE .5; .1 MG/1; MG/1
1 TABLET ORAL DAILY
Qty: 30 TABLET | Refills: 11 | Status: SHIPPED | OUTPATIENT
Start: 2018-11-30 | End: 2018-11-30 | Stop reason: SDUPTHER

## 2018-11-30 RX ORDER — ESTRADIOL AND NORETHINDRONE ACETATE .5; .1 MG/1; MG/1
1 TABLET ORAL DAILY
Qty: 90 TABLET | Refills: 3 | Status: SHIPPED | OUTPATIENT
Start: 2018-11-30 | End: 2019-11-01 | Stop reason: SDUPTHER

## 2018-11-30 NOTE — PATIENT INSTRUCTIONS

## 2018-11-30 NOTE — PROGRESS NOTES
"Subjective   Patricia Palma is a 53 y.o. female.     Annual exam.     The following portions of the patient's history were reviewed and updated as appropriate: allergies, current medications, past family history, past medical history, past social history, past surgical history and problem list.    /64   Ht 172.7 cm (68\")   Wt 77.6 kg (171 lb)   BMI 26.00 kg/m²     Review of Systems   Constitutional: Negative for activity change, appetite change, fatigue and fever.   HENT: Negative for congestion, sore throat and trouble swallowing.    Eyes: Negative for pain, discharge and visual disturbance.   Respiratory: Negative for apnea, shortness of breath and wheezing.    Cardiovascular: Negative for chest pain, palpitations and leg swelling.   Gastrointestinal: Negative for abdominal pain, constipation and diarrhea.   Genitourinary: Negative for frequency, pelvic pain, urgency and vaginal discharge.        Vaginal dryness     Musculoskeletal: Negative for back pain and gait problem.   Skin: Negative for color change and rash.   Neurological: Negative for dizziness, weakness and numbness.   Psychiatric/Behavioral: Negative for confusion and sleep disturbance.       Objective   Physical Exam   Constitutional: She is oriented to person, place, and time. She appears well-developed and well-nourished. No distress.   HENT:   Head: Normocephalic.   Right Ear: External ear normal.   Left Ear: External ear normal.   Nose: Nose normal.   Mouth/Throat: Oropharynx is clear and moist.   Eyes: Conjunctivae are normal. Right eye exhibits no discharge. Left eye exhibits no discharge. No scleral icterus.   Neck: Normal range of motion. Neck supple. Carotid bruit is not present. No tracheal deviation present. No thyromegaly present.   Cardiovascular: Normal rate, regular rhythm, normal heart sounds and intact distal pulses.   No murmur heard.  Pulmonary/Chest: Effort normal and breath sounds normal. No respiratory distress. She has " no wheezes. Right breast exhibits no inverted nipple, no mass, no nipple discharge, no skin change and no tenderness. Left breast exhibits no inverted nipple, no mass, no nipple discharge, no skin change and no tenderness. Breasts are symmetrical. There is no breast swelling.   Abdominal: Soft. She exhibits no distension and no mass. There is no tenderness. There is no guarding. No hernia. Hernia confirmed negative in the right inguinal area and confirmed negative in the left inguinal area.   Genitourinary: Rectum normal, vagina normal and uterus normal. Rectal exam shows no mass. No breast tenderness, discharge or bleeding. Pelvic exam was performed with patient supine. There is no rash, tenderness, lesion or injury on the right labia. There is no rash, tenderness, lesion or injury on the left labia. Uterus is not enlarged, not fixed and not tender. Cervix exhibits no motion tenderness, no discharge and no friability. Right adnexum displays no mass, no tenderness and no fullness. Left adnexum displays no mass, no tenderness and no fullness. No erythema, tenderness or bleeding in the vagina. No foreign body in the vagina. No signs of injury around the vagina. No vaginal discharge found.   Genitourinary Comments:   BSU normal  Urethral meatus  Normal  Perineum  Normal  Vaginal atrophy   Musculoskeletal: Normal range of motion. She exhibits no edema or tenderness.   Lymphadenopathy:        Head (right side): No submental, no submandibular, no tonsillar, no preauricular, no posterior auricular and no occipital adenopathy present.        Head (left side): No submental, no submandibular, no tonsillar, no preauricular, no posterior auricular and no occipital adenopathy present.     She has no cervical adenopathy.        Right cervical: No superficial cervical, no deep cervical and no posterior cervical adenopathy present.       Left cervical: No superficial cervical, no deep cervical and no posterior cervical adenopathy  present.     She has no axillary adenopathy.        Right: No inguinal adenopathy present.        Left: No inguinal adenopathy present.   Neurological: She is alert and oriented to person, place, and time. Coordination normal.   Skin: Skin is warm and dry. No bruising and no rash noted. She is not diaphoretic. No erythema.   Psychiatric: She has a normal mood and affect. Her behavior is normal. Judgment and thought content normal.   Nursing note and vitals reviewed.      Assessment/Plan    Well woman exam. Pap collected. Mammogram ordered.   Pt reports PCP follows annual labs.     Pt desires to continue HRT at current dose.   Discussed risks, benefits and alternatives, pt voiced understanding and opts to continue HRT at current dose.  Will refill.     Patient's Body mass index is 26 kg/m². BMI is above normal parameters. Recommendations include: educational material.    RV annual exam/prn.     Patricia was seen today for gynecologic exam.    Diagnoses and all orders for this visit:    Encounter for gynecological examination without abnormal finding  -     Liquid-based Pap Smear, Screening    BMI 26.0-26.9,adult    Non-smoker    Menopause  -     Discontinue: Estradiol-Norethindrone Acet 0.5-0.1 MG per tablet; Take 1 tablet by mouth Daily.  -     Estradiol-Norethindrone Acet 0.5-0.1 MG per tablet; Take 1 tablet by mouth Daily.    Vaginal dryness, menopausal    Encounter for screening mammogram for malignant neoplasm of breast  -     Mammo Screening Digital Tomosynthesis Bilateral With CAD; Future

## 2018-12-06 DIAGNOSIS — Z78.0 MENOPAUSE: ICD-10-CM

## 2018-12-06 RX ORDER — ESTRADIOL AND NORETHINDRONE ACETATE .5; .1 MG/1; MG/1
TABLET ORAL
Qty: 30 TABLET | Refills: 0 | OUTPATIENT
Start: 2018-12-06

## 2018-12-12 LAB
GEN CATEG CVX/VAG CYTO-IMP: NORMAL
LAB AP CASE REPORT: NORMAL
LAB AP GYN ADDITIONAL INFORMATION: NORMAL
LAB AP GYN OTHER FINDINGS: NORMAL
PATH INTERP SPEC-IMP: NORMAL
STAT OF ADQ CVX/VAG CYTO-IMP: NORMAL

## 2019-03-27 ENCOUNTER — APPOINTMENT (OUTPATIENT)
Dept: MAMMOGRAPHY | Facility: HOSPITAL | Age: 54
End: 2019-03-27

## 2019-05-08 ENCOUNTER — HOSPITAL ENCOUNTER (OUTPATIENT)
Dept: MAMMOGRAPHY | Facility: HOSPITAL | Age: 54
Discharge: HOME | End: 2019-05-08
Admitting: NURSE PRACTITIONER

## 2019-05-08 DIAGNOSIS — Z12.31 ENCOUNTER FOR SCREENING MAMMOGRAM FOR MALIGNANT NEOPLASM OF BREAST: ICD-10-CM

## 2019-05-08 PROCEDURE — 77067 SCR MAMMO BI INCL CAD: CPT

## 2019-05-08 PROCEDURE — 77063 BREAST TOMOSYNTHESIS BI: CPT

## 2019-07-03 ENCOUNTER — APPOINTMENT (OUTPATIENT)
Dept: CT IMAGING | Facility: HOSPITAL | Age: 54
End: 2019-07-03

## 2019-07-03 ENCOUNTER — HOSPITAL ENCOUNTER (EMERGENCY)
Facility: HOSPITAL | Age: 54
Discharge: HOME | End: 2019-07-03
Admitting: EMERGENCY MEDICINE

## 2019-07-03 VITALS
TEMPERATURE: 98.2 F | WEIGHT: 177.4 LBS | HEART RATE: 60 BPM | DIASTOLIC BLOOD PRESSURE: 67 MMHG | BODY MASS INDEX: 26.89 KG/M2 | RESPIRATION RATE: 18 BRPM | SYSTOLIC BLOOD PRESSURE: 104 MMHG | OXYGEN SATURATION: 98 % | HEIGHT: 68 IN

## 2019-07-03 DIAGNOSIS — R11.0 NAUSEA: ICD-10-CM

## 2019-07-03 DIAGNOSIS — R10.13 EPIGASTRIC PAIN: Primary | ICD-10-CM

## 2019-07-03 LAB
ALBUMIN SERPL-MCNC: 4.8 G/DL (ref 3.5–5)
ALBUMIN/GLOB SERPL: 1.7 G/DL (ref 1.1–2.5)
ALP SERPL-CCNC: 66 U/L (ref 24–120)
ALT SERPL W P-5'-P-CCNC: 21 U/L (ref 0–54)
AMYLASE SERPL-CCNC: 55 U/L (ref 30–110)
ANION GAP SERPL CALCULATED.3IONS-SCNC: 9 MMOL/L (ref 4–13)
AST SERPL-CCNC: 24 U/L (ref 7–45)
BASOPHILS # BLD AUTO: 0.04 10*3/MM3 (ref 0–0.2)
BASOPHILS NFR BLD AUTO: 0.6 % (ref 0–2)
BILIRUB SERPL-MCNC: 0.6 MG/DL (ref 0.1–1)
BILIRUB UR QL STRIP: NEGATIVE
BUN BLD-MCNC: 12 MG/DL (ref 5–21)
BUN/CREAT SERPL: 21.4 (ref 7–25)
CALCIUM SPEC-SCNC: 9.1 MG/DL (ref 8.4–10.4)
CHLORIDE SERPL-SCNC: 106 MMOL/L (ref 98–110)
CLARITY UR: CLEAR
CO2 SERPL-SCNC: 26 MMOL/L (ref 24–31)
COLOR UR: YELLOW
CREAT BLD-MCNC: 0.56 MG/DL (ref 0.5–1.4)
D-LACTATE SERPL-SCNC: 1 MMOL/L (ref 0.5–2)
DEPRECATED RDW RBC AUTO: 38.1 FL (ref 40–54)
EOSINOPHIL # BLD AUTO: 0.04 10*3/MM3 (ref 0–0.7)
EOSINOPHIL NFR BLD AUTO: 0.6 % (ref 0–4)
ERYTHROCYTE [DISTWIDTH] IN BLOOD BY AUTOMATED COUNT: 11.5 % (ref 12–15)
GFR SERPL CREATININE-BSD FRML MDRD: 113 ML/MIN/1.73
GLOBULIN UR ELPH-MCNC: 2.9 GM/DL
GLUCOSE BLD-MCNC: 102 MG/DL (ref 70–100)
GLUCOSE UR STRIP-MCNC: NEGATIVE MG/DL
HCT VFR BLD AUTO: 40.6 % (ref 37–47)
HGB BLD-MCNC: 14.2 G/DL (ref 12–16)
HGB UR QL STRIP.AUTO: NEGATIVE
HOLD SPECIMEN: NORMAL
IMM GRANULOCYTES # BLD AUTO: 0.02 10*3/MM3 (ref 0–0.05)
IMM GRANULOCYTES NFR BLD AUTO: 0.3 % (ref 0–5)
KETONES UR QL STRIP: NEGATIVE
LEUKOCYTE ESTERASE UR QL STRIP.AUTO: NEGATIVE
LIPASE SERPL-CCNC: 61 U/L (ref 23–203)
LYMPHOCYTES # BLD AUTO: 0.99 10*3/MM3 (ref 0.72–4.86)
LYMPHOCYTES NFR BLD AUTO: 15.1 % (ref 15–45)
MCH RBC QN AUTO: 31.8 PG (ref 28–32)
MCHC RBC AUTO-ENTMCNC: 35 G/DL (ref 33–36)
MCV RBC AUTO: 90.8 FL (ref 82–98)
MONOCYTES # BLD AUTO: 0.33 10*3/MM3 (ref 0.19–1.3)
MONOCYTES NFR BLD AUTO: 5 % (ref 4–12)
NEUTROPHILS # BLD AUTO: 5.13 10*3/MM3 (ref 1.87–8.4)
NEUTROPHILS NFR BLD AUTO: 78.4 % (ref 39–78)
NITRITE UR QL STRIP: NEGATIVE
NRBC BLD AUTO-RTO: 0 /100 WBC (ref 0–0.2)
PH UR STRIP.AUTO: 8.5 [PH] (ref 5–8)
PLATELET # BLD AUTO: 272 10*3/MM3 (ref 130–400)
PMV BLD AUTO: 10.3 FL (ref 6–12)
POTASSIUM BLD-SCNC: 3.9 MMOL/L (ref 3.5–5.3)
PROT SERPL-MCNC: 7.7 G/DL (ref 6.3–8.7)
PROT UR QL STRIP: NEGATIVE
RBC # BLD AUTO: 4.47 10*6/MM3 (ref 4.2–5.4)
SODIUM BLD-SCNC: 141 MMOL/L (ref 135–145)
SP GR UR STRIP: <=1.005 (ref 1–1.03)
UROBILINOGEN UR QL STRIP: ABNORMAL
WBC NRBC COR # BLD: 6.55 10*3/MM3 (ref 4.8–10.8)
WHOLE BLOOD HOLD SPECIMEN: NORMAL
WHOLE BLOOD HOLD SPECIMEN: NORMAL

## 2019-07-03 PROCEDURE — 74177 CT ABD & PELVIS W/CONTRAST: CPT

## 2019-07-03 PROCEDURE — 96374 THER/PROPH/DIAG INJ IV PUSH: CPT

## 2019-07-03 PROCEDURE — 80053 COMPREHEN METABOLIC PANEL: CPT | Performed by: NURSE PRACTITIONER

## 2019-07-03 PROCEDURE — 83605 ASSAY OF LACTIC ACID: CPT | Performed by: NURSE PRACTITIONER

## 2019-07-03 PROCEDURE — 99284 EMERGENCY DEPT VISIT MOD MDM: CPT

## 2019-07-03 PROCEDURE — 36415 COLL VENOUS BLD VENIPUNCTURE: CPT

## 2019-07-03 PROCEDURE — 25010000002 IOPAMIDOL 61 % SOLUTION: Performed by: NURSE PRACTITIONER

## 2019-07-03 PROCEDURE — 82150 ASSAY OF AMYLASE: CPT | Performed by: NURSE PRACTITIONER

## 2019-07-03 PROCEDURE — 25010000002 ONDANSETRON PER 1 MG: Performed by: NURSE PRACTITIONER

## 2019-07-03 PROCEDURE — 83690 ASSAY OF LIPASE: CPT | Performed by: NURSE PRACTITIONER

## 2019-07-03 PROCEDURE — 81003 URINALYSIS AUTO W/O SCOPE: CPT | Performed by: NURSE PRACTITIONER

## 2019-07-03 PROCEDURE — 85025 COMPLETE CBC W/AUTO DIFF WBC: CPT | Performed by: NURSE PRACTITIONER

## 2019-07-03 PROCEDURE — 96375 TX/PRO/DX INJ NEW DRUG ADDON: CPT

## 2019-07-03 RX ORDER — ONDANSETRON 4 MG/1
4 TABLET, ORALLY DISINTEGRATING ORAL EVERY 6 HOURS PRN
Qty: 10 TABLET | Refills: 0 | Status: SHIPPED | OUTPATIENT
Start: 2019-07-03 | End: 2019-09-17

## 2019-07-03 RX ORDER — ONDANSETRON 2 MG/ML
4 INJECTION INTRAMUSCULAR; INTRAVENOUS ONCE
Status: COMPLETED | OUTPATIENT
Start: 2019-07-03 | End: 2019-07-03

## 2019-07-03 RX ORDER — PANTOPRAZOLE SODIUM 40 MG/1
40 TABLET, DELAYED RELEASE ORAL DAILY
Qty: 30 TABLET | Refills: 0 | Status: SHIPPED | OUTPATIENT
Start: 2019-07-03 | End: 2019-09-17

## 2019-07-03 RX ORDER — FAMOTIDINE 10 MG/ML
20 INJECTION, SOLUTION INTRAVENOUS ONCE
Status: COMPLETED | OUTPATIENT
Start: 2019-07-03 | End: 2019-07-03

## 2019-07-03 RX ADMIN — HYDROMORPHONE HYDROCHLORIDE 1 MG: 1 INJECTION, SOLUTION INTRAMUSCULAR; INTRAVENOUS; SUBCUTANEOUS at 08:12

## 2019-07-03 RX ADMIN — ONDANSETRON 4 MG: 2 INJECTION INTRAMUSCULAR; INTRAVENOUS at 08:12

## 2019-07-03 RX ADMIN — FAMOTIDINE 20 MG: 10 INJECTION, SOLUTION INTRAVENOUS at 08:12

## 2019-07-03 RX ADMIN — IOPAMIDOL 100 ML: 612 INJECTION, SOLUTION INTRAVENOUS at 10:02

## 2019-07-03 RX ADMIN — SODIUM CHLORIDE 1000 ML: 9 INJECTION, SOLUTION INTRAVENOUS at 08:14

## 2019-07-03 NOTE — ED PROVIDER NOTES
Subjective   Patient is a 54-year-old white female presents emergency department with generalized abdominal pain that started around 1030 last night.  She states she has had intermittent nausea without vomiting as well.  She denies any fever.  She states she has had loose stools for the last 2 to 3 days as well.  She does states she has been belching more often over the past few weeks.  Denies EtOH abuse.  She states that the pain has become much worse around 3:00 this morning.  She has taken Tums without relief of symptoms.        History provided by:  Patient   used: No        Review of Systems   Constitutional: Negative.    HENT: Negative.    Eyes: Negative.    Respiratory: Negative.    Cardiovascular: Negative.    Gastrointestinal:        Patient is a 54-year-old white female presents emergency department with generalized abdominal pain that started around 1030 last night.  She states she has had intermittent nausea without vomiting as well.  She denies any fever.  She states she has had loose stools for the last 2 to 3 days as well.  She does states she has been belching more often over the past few weeks.  Denies EtOH abuse.  She states that the pain has become much worse around 3:00 this morning.  She has taken Tums without relief of symptoms.     Endocrine: Negative.    Genitourinary: Negative.    Musculoskeletal: Negative.    Skin: Negative.    Allergic/Immunologic: Negative.    Neurological: Negative.    Hematological: Negative.    Psychiatric/Behavioral: Negative.    All other systems reviewed and are negative.      Past Medical History:   Diagnosis Date   • Diverticulosis    • Shoulder pain, left        No Known Allergies    Past Surgical History:   Procedure Laterality Date   • APPENDECTOMY     •  SECTION      x2   • COLONOSCOPY      10 years   • COLONOSCOPY N/A 3/8/2018    Procedure: COLONOSCOPY WITH ANESTHESIA;  Surgeon: Natalie Cruz MD;  Location: [facility] ENDOSCOPY;   "Service:    • SHOULDER MANIPULATION Left 01/31/2018       Family History   Problem Relation Age of Onset   • No Known Problems Mother    • No Known Problems Father    • No Known Problems Sister    • No Known Problems Brother    • No Known Problems Daughter    • No Known Problems Son    • No Known Problems Maternal Grandmother    • Breast cancer Paternal Grandmother    • No Known Problems Maternal Aunt    • Colon cancer Neg Hx    • Colon polyps Neg Hx    • BRCA 1/2 Neg Hx    • Endometrial cancer Neg Hx    • Ovarian cancer Neg Hx        Social History     Socioeconomic History   • Marital status:      Spouse name: Not on file   • Number of children: Not on file   • Years of education: Not on file   • Highest education level: Not on file   Tobacco Use   • Smoking status: Never Smoker   • Smokeless tobacco: Never Used   Substance and Sexual Activity   • Alcohol use: No   • Drug use: No   • Sexual activity: Defer       Prior to Admission medications    Medication Sig Start Date End Date Taking? Authorizing Provider   Acetaminophen (TYLENOL EXTRA STRENGTH PO) Take 2 tablets by mouth As Needed (for shoulder pain).    Provider, MD Iona   dicyclomine (BENTYL) 10 MG capsule 10 mg As Needed. 10/16/17   Provider, MD Iona   Estradiol-Norethindrone Acet 0.5-0.1 MG per tablet Take 1 tablet by mouth Daily. 11/30/18   Brianna Villaseñor APRN       /67   Pulse 60   Temp 98.2 °F (36.8 °C)   Resp 18   Ht 172.7 cm (68\")   Wt 80.5 kg (177 lb 6.4 oz)   SpO2 98%   BMI 26.97 kg/m²     Objective   Physical Exam   Constitutional: She is oriented to person, place, and time. She appears well-developed and well-nourished.   Nontoxic-appearing   HENT:   Head: Normocephalic and atraumatic.   Eyes: Conjunctivae and EOM are normal. Pupils are equal, round, and reactive to light.   Neck: Normal range of motion. Neck supple. No tracheal deviation present. No thyromegaly present.   Cardiovascular: Normal rate, " regular rhythm, normal heart sounds and intact distal pulses.   Pulmonary/Chest: Effort normal and breath sounds normal. No respiratory distress. She has no wheezes. She has no rales. She exhibits no tenderness.   Abdominal: Soft. Bowel sounds are normal.   Generalized abd tenderness noted. Most to llq abd. No guarding or rebound. bs x 4 quads   Musculoskeletal: Normal range of motion.   Neurological: She is alert and oriented to person, place, and time. She has normal reflexes. No cranial nerve deficit.   Skin: Skin is warm and dry.   Psychiatric: She has a normal mood and affect. Her behavior is normal. Judgment and thought content normal.   Nursing note and vitals reviewed.      Procedures         Lab Results (last 24 hours)     Procedure Component Value Units Date/Time    [place] Urine - Urine, Clean Catch [[id number]] Collected:  07/03/19 0755    Specimen:  Urine, Clean Catch Updated:  07/03/19 0901     Extra Tube Hold for add-ons.     Comment: Auto resulted.       Urinalysis With Culture If Indicated - Urine, Clean Catch [[id number]]  (Abnormal) Collected:  07/03/19 0755    Specimen:  Urine, Clean Catch Updated:  07/03/19 0903     Color, UA Yellow     Appearance, UA Clear     pH, UA 8.5     Specific Gravity, UA <=1.005     Glucose, UA Negative     Ketones, UA Negative     Bilirubin, UA Negative     Blood, UA Negative     Protein, UA Negative     Leuk Esterase, UA Negative     Nitrite, UA Negative     Urobilinogen, UA 0.2 E.U./dL    Narrative:       Urine microscopic not indicated.    CBC & Differential [[id number]] Collected:  07/03/19 0812    Specimen:  Blood Updated:  07/03/19 0831    Narrative:       The following orders were created for panel order CBC & Differential.  Procedure                               Abnormality         Status                     ---------                               -----------         ------                     CBC Auto Differential[[id number]]        Abnormal            Final  result                 Please view results for these tests on the individual orders.    Comprehensive Metabolic Panel [[id number]]  (Abnormal) Collected:  07/03/19 0812    Specimen:  Blood Updated:  07/03/19 0845     Glucose 102 mg/dL      BUN 12 mg/dL      Creatinine 0.56 mg/dL      Sodium 141 mmol/L      Potassium 3.9 mmol/L      Chloride 106 mmol/L      CO2 26.0 mmol/L      Calcium 9.1 mg/dL      Total Protein 7.7 g/dL      Albumin 4.80 g/dL      ALT (SGPT) 21 U/L      AST (SGOT) 24 U/L      Alkaline Phosphatase 66 U/L      Total Bilirubin 0.6 mg/dL      eGFR Non African Amer 113 mL/min/1.73      Globulin 2.9 gm/dL      A/G Ratio 1.7 g/dL      BUN/Creatinine Ratio 21.4     Anion Gap 9.0 mmol/L     Narrative:       GFR Normal >60  Chronic Kidney Disease <60  Kidney Failure <15    Lipase [[id number]]  (Normal) Collected:  07/03/19 0812    Specimen:  Blood Updated:  07/03/19 0845     Lipase 61 U/L     Amylase [[id number]]  (Normal) Collected:  07/03/19 0812    Specimen:  Blood Updated:  07/03/19 0845     Amylase 55 U/L     Lactic Acid, Plasma [[id number]]  (Normal) Collected:  07/03/19 0812    Specimen:  Blood Updated:  07/03/19 0841     Lactate 1.0 mmol/L     CBC Auto Differential [[id number]]  (Abnormal) Collected:  07/03/19 0812    Specimen:  Blood Updated:  07/03/19 0831     WBC 6.55 10*3/mm3      RBC 4.47 10*6/mm3      Hemoglobin 14.2 g/dL      Hematocrit 40.6 %      MCV 90.8 fL      MCH 31.8 pg      MCHC 35.0 g/dL      RDW 11.5 %      RDW-SD 38.1 fl      MPV 10.3 fL      Platelets 272 10*3/mm3      Neutrophil % 78.4 %      Lymphocyte % 15.1 %      Monocyte % 5.0 %      Eosinophil % 0.6 %      Basophil % 0.6 %      Immature Grans % 0.3 %      Neutrophils, Absolute 5.13 10*3/mm3      Lymphocytes, Absolute 0.99 10*3/mm3      Monocytes, Absolute 0.33 10*3/mm3      Eosinophils, Absolute 0.04 10*3/mm3      Basophils, Absolute 0.04 10*3/mm3      Immature Grans, Absolute 0.02 10*3/mm3      nRBC 0.0 /100 WBC           CT  Abdomen Pelvis With Contrast   Final Result   1.  No acute abdominopelvic abnormalities.   2.  Colonic diverticulosis, without evidence of acute diverticulitis.   3.  Fibroid uterus.   4.  Horseshoe kidneys.   This report was finalized on 07/03/2019 10:26 by Dr. Janey Farooq MD.      US gallbladder    (Results Pending)   NM hepatobiliary without CCK    (Results Pending)       ED Course  ED Course as of Jul 03 1249   Wed Jul 03, 2019   0903 Pending ct scan abd/pelvis at this time   [CW]   1037 Reviewed results with pt and pt spouse. Pt states that she is somewhat better at this time. Will order o/p us of gallbladder and hida scan and have f/u with pcp this week. Pt will be discharged home in stable condition to follow up with pcp this week. Advised bland diet. Advised to avoid fried or fatty foods. Advised to return if symptoms worsen   [CW]      ED Course User Index  [CW] Li Ray APRN          MDM  Number of Diagnoses or Management Options  Epigastric pain: minor  Nausea: minor     Amount and/or Complexity of Data Reviewed  Clinical lab tests: ordered and reviewed  Tests in the radiology section of CPT®: ordered and reviewed    Patient Progress  Patient progress: stable      Final diagnoses:   Epigastric pain   Nausea          Li Ray, SHIV  07/03/19 1242

## 2019-07-03 NOTE — DISCHARGE INSTRUCTIONS
Return to ER if symptoms worsen   Avoid fried or fatty foods  Increase oral fluids    Abdominal Pain, Adult  Many things can cause belly (abdominal) pain. Most times, belly pain is not dangerous. Many cases of belly pain can be watched and treated at home. Sometimes belly pain is serious, though. Your doctor will try to find the cause of your belly pain.  Follow these instructions at home:  · Take over-the-counter and prescription medicines only as told by your doctor. Do not take medicines that help you poop (laxatives) unless told to by your doctor.  · Drink enough fluid to keep your pee (urine) clear or pale yellow.  · Watch your belly pain for any changes.  · Keep all follow-up visits as told by your doctor. This is important.  Contact a doctor if:  · Your belly pain changes or gets worse.  · You are not hungry, or you lose weight without trying.  · You are having trouble pooping (constipated) or have watery poop (diarrhea) for more than 2-3 days.  · You have pain when you pee or poop.  · Your belly pain wakes you up at night.  · Your pain gets worse with meals, after eating, or with certain foods.  · You are throwing up and cannot keep anything down.  · You have a fever.  Get help right away if:  · Your pain does not go away as soon as your doctor says it should.  · You cannot stop throwing up.  · Your pain is only in areas of your belly, such as the right side or the left lower part of the belly.  · You have bloody or black poop, or poop that looks like tar.  · You have very bad pain, cramping, or bloating in your belly.  · You have signs of not having enough fluid or water in your body (dehydration), such as:  ? Dark pee, very little pee, or no pee.  ? Cracked lips.  ? Dry mouth.  ? Sunken eyes.  ? Sleepiness.  ? Weakness.  This information is not intended to replace advice given to you by your health care provider. Make sure you discuss any questions you have with your health care provider.  Document  Released: 06/05/2009 Document Revised: 07/07/2017 Document Reviewed: 05/31/2017  [employer] Interactive Patient Education © 2019 [employer]      Nausea, Adult  Nausea is the feeling of an upset stomach or having to vomit. Nausea on its own is not usually a serious concern, but it may be an early sign of a more serious medical problem. As nausea gets worse, it can lead to vomiting. If vomiting develops, or if you are not able to drink enough fluids, you are at risk of becoming dehydrated. Dehydration can make you tired and thirsty, cause you to have a dry mouth, and decrease how often you urinate. Older adults and people with other diseases or a weak immune system are at higher risk for dehydration. The main goals of treating your nausea are:  · To limit repeated nausea episodes.  · To prevent vomiting and dehydration.    Follow these instructions at home:  Follow instructions from your health care provider about how to care for yourself at home.  Eating and drinking  Follow these recommendations as told by your health care provider:  · Take an oral rehydration solution (ORS). This is a drink that is sold at pharmacies and retail stores.  · Drink clear fluids in small amounts as you are able. Clear fluids include water, ice chips, diluted fruit juice, and low-calorie sports drinks.  · Eat bland, easy-to-digest foods in small amounts as you are able. These foods include bananas, applesauce, rice, lean meats, toast, and crackers.  · Avoid drinking fluids that contain a lot of sugar or caffeine, such as energy drinks, sports drinks, and soda.  · Avoid alcohol.  · Avoid spicy or fatty foods.    General instructions  · Drink enough fluid to keep your urine clear or pale yellow.  · Wash your hands often. If soap and water are not available, use hand .  · Make sure that all people in your household wash their hands well and often.  · Rest at home while you recover.  · Take over-the-counter and prescription  medicines only as told by your health care provider.  · Breathe slowly and deeply when you feel nauseous.  · Watch your condition for any changes.  · Keep all follow-up visits as told by your health care provider. This is important.  Contact a health care provider if:  · You have a headache.  · You have new symptoms.  · Your nausea gets worse.  · You have a fever.  · You feel light-headed or dizzy.  · You vomit.  · You cannot keep fluids down.  Get help right away if:  · You have pain in your chest, neck, arm, or jaw.  · You feel extremely weak or you faint.  · You have vomit that is bright red or looks like coffee grounds.  · You have bloody or black stools or stools that look like tar.  · You have a severe headache, a stiff neck, or both.  · You have severe pain, cramping, or bloating in your abdomen.  · You have a rash.  · You have difficulty breathing or are breathing very quickly.  · Your heart is beating very quickly.  · Your skin feels cold and clammy.  · You feel confused.  · You have pain when you urinate.  · You have signs of dehydration, such as:  ? Dark urine, very little, or no urine.  ? Cracked lips.  ? Dry mouth.  ? Sunken eyes.  ? Sleepiness.  ? Weakness.  These symptoms may represent a serious problem that is an emergency. Do not wait to see if the symptoms will go away. Get medical help right away. Call your local emergency services (911 in the [place] Do not drive yourself to the hospital.  This information is not intended to replace advice given to you by your health care provider. Make sure you discuss any questions you have with your health care provider.  Document Released: 01/25/2006 Document Revised: 05/22/2017 Document Reviewed: 08/23/2016  [employer] Interactive Patient Education © 2019 [employer]

## 2019-07-09 ENCOUNTER — HOSPITAL ENCOUNTER (OUTPATIENT)
Dept: ULTRASOUND IMAGING | Facility: HOSPITAL | Age: 54
Discharge: HOME | End: 2019-07-09
Admitting: NURSE PRACTITIONER

## 2019-07-09 ENCOUNTER — HOSPITAL ENCOUNTER (OUTPATIENT)
Dept: NUCLEAR MEDICINE | Facility: HOSPITAL | Age: 54
Discharge: HOME | End: 2019-07-09

## 2019-07-09 DIAGNOSIS — R10.13 EPIGASTRIC PAIN: ICD-10-CM

## 2019-07-09 DIAGNOSIS — R11.0 NAUSEA: ICD-10-CM

## 2019-07-09 PROCEDURE — 0 TECHNETIUM TC 99M MEBROFENIN KIT: Performed by: NURSE PRACTITIONER

## 2019-07-09 PROCEDURE — 78226 HEPATOBILIARY SYSTEM IMAGING: CPT

## 2019-07-09 PROCEDURE — 76705 ECHO EXAM OF ABDOMEN: CPT

## 2019-07-09 PROCEDURE — A9537 TC99M MEBROFENIN: HCPCS | Performed by: NURSE PRACTITIONER

## 2019-07-09 RX ORDER — KIT FOR THE PREPARATION OF TECHNETIUM TC 99M MEBROFENIN 45 MG/10ML
1 INJECTION, POWDER, LYOPHILIZED, FOR SOLUTION INTRAVENOUS
Status: COMPLETED | OUTPATIENT
Start: 2019-07-09 | End: 2019-07-09

## 2019-07-09 RX ADMIN — MEBROFENIN 1 DOSE: 45 INJECTION, POWDER, LYOPHILIZED, FOR SOLUTION INTRAVENOUS at 12:14

## 2019-07-12 ENCOUNTER — TELEPHONE (OUTPATIENT)
Dept: EMERGENCY DEPT | Facility: HOSPITAL | Age: 54
End: 2019-07-12

## 2019-09-17 ENCOUNTER — PROCEDURE VISIT (OUTPATIENT)
Dept: OBSTETRICS AND GYNECOLOGY | Facility: CLINIC | Age: 54
End: 2019-09-17

## 2019-09-17 ENCOUNTER — OFFICE VISIT (OUTPATIENT)
Dept: OBSTETRICS AND GYNECOLOGY | Facility: CLINIC | Age: 54
End: 2019-09-17

## 2019-09-17 VITALS
HEIGHT: 68 IN | WEIGHT: 172 LBS | DIASTOLIC BLOOD PRESSURE: 78 MMHG | SYSTOLIC BLOOD PRESSURE: 116 MMHG | BODY MASS INDEX: 26.07 KG/M2

## 2019-09-17 DIAGNOSIS — N95.0 PMB (POSTMENOPAUSAL BLEEDING): Primary | ICD-10-CM

## 2019-09-17 DIAGNOSIS — Z78.9 NON-SMOKER: ICD-10-CM

## 2019-09-17 PROCEDURE — 99214 OFFICE O/P EST MOD 30 MIN: CPT | Performed by: OBSTETRICS & GYNECOLOGY

## 2019-09-17 PROCEDURE — 76830 TRANSVAGINAL US NON-OB: CPT | Performed by: OBSTETRICS & GYNECOLOGY

## 2019-09-17 RX ORDER — ALBUTEROL SULFATE 2.5 MG/3ML
SOLUTION RESPIRATORY (INHALATION)
Refills: 0 | COMMUNITY
Start: 2019-08-08

## 2019-09-17 NOTE — PROGRESS NOTES
Dorys Palma  : 1965  MRN: [id number]  CSN: [id number]    History and Physical    Subjective   Patricia Palma is a 54 y.o. year old  who presents for consultation about PMB.  Patient reports that bleeding started two weeks ago and was old in appearance.  No bleeding now, but it did last for several days.    Past Medical History:   Diagnosis Date   • Diverticulosis    • Shoulder pain, left      Past Surgical History:   Procedure Laterality Date   • APPENDECTOMY     •  SECTION      x2   • COLONOSCOPY      10 years   • COLONOSCOPY N/A 3/8/2018    Procedure: COLONOSCOPY WITH ANESTHESIA;  Surgeon: Natalie Cruz MD;  Location: [facility] ENDOSCOPY;  Service:    • SHOULDER MANIPULATION Left 2018     Social History    Tobacco Use      Smoking status: Never Smoker      Smokeless tobacco: Never Used      Current Outpatient Medications:   •  albuterol (PROVENTIL) (2.5 MG/3ML) 0.083% nebulizer solution, INHALE 1 VIAL AS DIRECTED EVERY SIX HOURS AS NEEDED, Disp: , Rfl: 0  •  Estradiol-Norethindrone Acet 0.5-0.1 MG per tablet, Take 1 tablet by mouth Daily., Disp: 90 tablet, Rfl: 3  •  PROAIR  (90 Base) MCG/ACT inhaler, , Disp: , Rfl:     Allergies   Allergen Reactions   • Cefzil [Cefprozil] Itching     Itching, swelling, rash, facial swelling       Family History   Problem Relation Age of Onset   • No Known Problems Mother    • No Known Problems Father    • No Known Problems Sister    • No Known Problems Brother    • No Known Problems Daughter    • No Known Problems Son    • No Known Problems Maternal Grandmother    • Breast cancer Paternal Grandmother    • No Known Problems Maternal Aunt    • Colon cancer Neg Hx    • Colon polyps Neg Hx    • BRCA 1/2 Neg Hx    • Endometrial cancer Neg Hx    • Ovarian cancer Neg Hx      Review of Systems   Constitutional: Negative for activity change and unexpected weight change.   HENT: Negative for congestion.    Respiratory: Positive for cough.  "Negative for shortness of breath.    Cardiovascular: Negative for chest pain.   Gastrointestinal: Positive for abdominal pain (stable for past two years, not always in same area.  She reports she is just \"sensitive\").         Objective   /78   Ht 172.7 cm (68\")   Wt 78 kg (172 lb)   BMI 26.15 kg/m²     Physical Exam   Physical Exam   Constitutional: She is oriented to person, place, and time. She appears well-developed and well-nourished. No distress.   HENT:   Head: Normocephalic and atraumatic.   Eyes: EOM are normal.   Neck: Normal range of motion. No thyromegaly present.   Pulmonary/Chest: Effort normal.   Abdominal: Soft. She exhibits no distension. There is no tenderness.   Genitourinary:   Genitourinary Comments: Uterus 6.8 X 5.5 X 3.8, endo lining .7cm  Ovaries normal  1 cm area of tissue in cervix, but no increased blood flow   Musculoskeletal: Normal range of motion.   Neurological: She is alert and oriented to person, place, and time.   Skin: Skin is warm and dry.   Psychiatric: She has a normal mood and affect. Her behavior is normal. Judgment normal.   Nursing note and vitals reviewed.      Labs  Lab Results   Component Value Date     07/03/2019    HGB 14.2 07/03/2019    HCT 40.6 07/03/2019    WBC 6.55 07/03/2019     07/03/2019    K 3.9 07/03/2019     07/03/2019    CO2 26.0 07/03/2019    BUN 12 07/03/2019    CREATININE 0.56 07/03/2019    GLUCOSE 102 (H) 07/03/2019    ALBUMIN 4.80 07/03/2019    CALCIUM 9.1 07/03/2019    AST 24 07/03/2019    ALT 21 07/03/2019    BILITOT 0.6 07/03/2019        Assessment & Plan    Patricia was seen today for postmenopausal bleeding.    Diagnoses and all orders for this visit:    PMB (postmenopausal bleeding): Patient with postmenopausal bleeding that started 2-1/2 weeks ago.  On ultrasound the patient also has thickened endometrial lining and a questionable area in the cervix.  In addition, the patient had cervical stenosis at the time of a " colposcopy exam not too long ago, so the idea of doing endometrial biopsy in the office is less favorable for her.  After discussion of in office biopsy versus a D&C in the operating room, the patient would prefer to go to the operating room to have the test with higher sensitivity.  The patient will be scheduled for September 30.  The procedure has been described and all of her questions answered.    BMI 26.0-26.9,adult    Non-smoker        Kaley August MD  9/17/2019  3:43 PM

## 2019-09-17 NOTE — H&P
Dorys Palma  : 1965  MRN: [id number]  CSN: [id number]    History and Physical    Subjective   Patricia Palma is a 54 y.o. year old  who presents for consultation about PMB.  Patient reports that bleeding started two weeks ago and was old in appearance.  No bleeding now, but it did last for several days.    Past Medical History:   Diagnosis Date   • Diverticulosis    • Shoulder pain, left      Past Surgical History:   Procedure Laterality Date   • APPENDECTOMY     •  SECTION      x2   • COLONOSCOPY      10 years   • COLONOSCOPY N/A 3/8/2018    Procedure: COLONOSCOPY WITH ANESTHESIA;  Surgeon: Natalie Cruz MD;  Location: [facility] ENDOSCOPY;  Service:    • SHOULDER MANIPULATION Left 2018     Social History    Tobacco Use      Smoking status: Never Smoker      Smokeless tobacco: Never Used      Current Outpatient Medications:   •  albuterol (PROVENTIL) (2.5 MG/3ML) 0.083% nebulizer solution, INHALE 1 VIAL AS DIRECTED EVERY SIX HOURS AS NEEDED, Disp: , Rfl: 0  •  Estradiol-Norethindrone Acet 0.5-0.1 MG per tablet, Take 1 tablet by mouth Daily., Disp: 90 tablet, Rfl: 3  •  PROAIR  (90 Base) MCG/ACT inhaler, , Disp: , Rfl:     Allergies   Allergen Reactions   • Cefzil [Cefprozil] Itching     Itching, swelling, rash, facial swelling       Family History   Problem Relation Age of Onset   • No Known Problems Mother    • No Known Problems Father    • No Known Problems Sister    • No Known Problems Brother    • No Known Problems Daughter    • No Known Problems Son    • No Known Problems Maternal Grandmother    • Breast cancer Paternal Grandmother    • No Known Problems Maternal Aunt    • Colon cancer Neg Hx    • Colon polyps Neg Hx    • BRCA 1/2 Neg Hx    • Endometrial cancer Neg Hx    • Ovarian cancer Neg Hx      Review of Systems   Constitutional: Negative for activity change and unexpected weight change.   HENT: Negative for congestion.    Respiratory: Positive for cough.  "Negative for shortness of breath.    Cardiovascular: Negative for chest pain.   Gastrointestinal: Positive for abdominal pain (stable for past two years, not always in same area.  She reports she is just \"sensitive\").         Objective   /78   Ht 172.7 cm (68\")   Wt 78 kg (172 lb)   BMI 26.15 kg/m²      Physical Exam   Physical Exam   Constitutional: She is oriented to person, place, and time. She appears well-developed and well-nourished. No distress.   HENT:   Head: Normocephalic and atraumatic.   Eyes: EOM are normal.   Neck: Normal range of motion. No thyromegaly present.   Pulmonary/Chest: Effort normal.   Abdominal: Soft. She exhibits no distension. There is no tenderness.   Genitourinary:   Genitourinary Comments: Uterus 6.8 X 5.5 X 3.8, endo lining .7cm  Ovaries normal  1 cm area of tissue in cervix, but no increased blood flow   Musculoskeletal: Normal range of motion.   Neurological: She is alert and oriented to person, place, and time.   Skin: Skin is warm and dry.   Psychiatric: She has a normal mood and affect. Her behavior is normal. Judgment normal.   Nursing note and vitals reviewed.      Labs  Lab Results   Component Value Date     07/03/2019    HGB 14.2 07/03/2019    HCT 40.6 07/03/2019    WBC 6.55 07/03/2019     07/03/2019    K 3.9 07/03/2019     07/03/2019    CO2 26.0 07/03/2019    BUN 12 07/03/2019    CREATININE 0.56 07/03/2019    GLUCOSE 102 (H) 07/03/2019    ALBUMIN 4.80 07/03/2019    CALCIUM 9.1 07/03/2019    AST 24 07/03/2019    ALT 21 07/03/2019    BILITOT 0.6 07/03/2019        Assessment & Plan    Patricia was seen today for postmenopausal bleeding.    Diagnoses and all orders for this visit:    PMB (postmenopausal bleeding): Patient with postmenopausal bleeding that started 2-1/2 weeks ago.  On ultrasound the patient also has thickened endometrial lining and a questionable area in the cervix.  In addition, the patient had cervical stenosis at the time of a " colposcopy exam not too long ago, so the idea of doing endometrial biopsy in the office is less favorable for her.  After discussion of in office biopsy versus a D&C in the operating room, the patient would prefer to go to the operating room to have the test with higher sensitivity.  The patient will be scheduled for September 30.  The procedure has been described and all of her questions answered.    BMI 26.0-26.9,adult    Non-smoker        Kaley August MD  9/17/2019  3:43 PM

## 2019-09-22 ENCOUNTER — RESULTS ENCOUNTER (OUTPATIENT)
Dept: OBSTETRICS AND GYNECOLOGY | Facility: CLINIC | Age: 54
End: 2019-09-22

## 2019-09-22 DIAGNOSIS — N95.0 PMB (POSTMENOPAUSAL BLEEDING): ICD-10-CM

## 2019-09-23 ENCOUNTER — APPOINTMENT (OUTPATIENT)
Dept: PREADMISSION TESTING | Facility: HOSPITAL | Age: 54
End: 2019-09-23

## 2019-09-23 VITALS
DIASTOLIC BLOOD PRESSURE: 65 MMHG | WEIGHT: 177.03 LBS | SYSTOLIC BLOOD PRESSURE: 125 MMHG | RESPIRATION RATE: 16 BRPM | OXYGEN SATURATION: 99 % | HEIGHT: 68 IN | HEART RATE: 83 BPM | BODY MASS INDEX: 26.83 KG/M2

## 2019-09-23 DIAGNOSIS — N95.0 PMB (POSTMENOPAUSAL BLEEDING): ICD-10-CM

## 2019-09-23 LAB
BASOPHILS # BLD AUTO: 0.05 10*3/MM3 (ref 0–0.2)
BASOPHILS NFR BLD AUTO: 0.7 % (ref 0–1.5)
DEPRECATED RDW RBC AUTO: 41.1 FL (ref 37–54)
EOSINOPHIL # BLD AUTO: 0.05 10*3/MM3 (ref 0–0.4)
EOSINOPHIL NFR BLD AUTO: 0.7 % (ref 0.3–6.2)
ERYTHROCYTE [DISTWIDTH] IN BLOOD BY AUTOMATED COUNT: 11.9 % (ref 12.3–15.4)
HCT VFR BLD AUTO: 39.4 % (ref 34–46.6)
HGB BLD-MCNC: 13.6 G/DL (ref 12–15.9)
IMM GRANULOCYTES # BLD AUTO: 0.02 10*3/MM3 (ref 0–0.05)
IMM GRANULOCYTES NFR BLD AUTO: 0.3 % (ref 0–0.5)
LYMPHOCYTES # BLD AUTO: 1.59 10*3/MM3 (ref 0.7–3.1)
LYMPHOCYTES NFR BLD AUTO: 23.2 % (ref 19.6–45.3)
MCH RBC QN AUTO: 32.2 PG (ref 26.6–33)
MCHC RBC AUTO-ENTMCNC: 34.5 G/DL (ref 31.5–35.7)
MCV RBC AUTO: 93.1 FL (ref 79–97)
MONOCYTES # BLD AUTO: 0.42 10*3/MM3 (ref 0.1–0.9)
MONOCYTES NFR BLD AUTO: 6.1 % (ref 5–12)
NEUTROPHILS # BLD AUTO: 4.71 10*3/MM3 (ref 1.7–7)
NEUTROPHILS NFR BLD AUTO: 69 % (ref 42.7–76)
NRBC BLD AUTO-RTO: 0 /100 WBC (ref 0–0.2)
PLATELET # BLD AUTO: 285 10*3/MM3 (ref 140–450)
PMV BLD AUTO: 10.3 FL (ref 6–12)
RBC # BLD AUTO: 4.23 10*6/MM3 (ref 3.77–5.28)
WBC NRBC COR # BLD: 6.84 10*3/MM3 (ref 3.4–10.8)

## 2019-09-23 PROCEDURE — 93005 ELECTROCARDIOGRAM TRACING: CPT

## 2019-09-23 PROCEDURE — 85025 COMPLETE CBC W/AUTO DIFF WBC: CPT | Performed by: OBSTETRICS & GYNECOLOGY

## 2019-09-23 PROCEDURE — 36415 COLL VENOUS BLD VENIPUNCTURE: CPT | Performed by: OBSTETRICS & GYNECOLOGY

## 2019-09-23 PROCEDURE — 93010 ELECTROCARDIOGRAM REPORT: CPT | Performed by: INTERNAL MEDICINE

## 2019-09-23 RX ORDER — MULTIPLE VITAMINS W/ MINERALS TAB 9MG-400MCG
1 TAB ORAL DAILY
COMMUNITY

## 2019-09-23 RX ORDER — CETIRIZINE HYDROCHLORIDE 10 MG/1
10 TABLET ORAL DAILY
COMMUNITY

## 2019-09-23 NOTE — DISCHARGE INSTRUCTIONS
DAY OF SURGERY INSTRUCTIONS        YOUR SURGEON: *** Dr. Kaley August    PROCEDURE: *** Dilatation And Curettage Hysteroscopy    DATE OF SURGERY: ***September 30, 2019    ARRIVAL TIME: AS DIRECTED BY OFFICE    YOU MAY TAKE THE FOLLOWING MEDICATION(S) THE MORNING OF SURGERY WITH A SIP OF WATER: *** As directed by your doctor    ALL OTHER HOME MEDICATIONS CHECK WITH YOUR DOCTOR              MANAGING PAIN AFTER SURGERY    We know you are probably wondering what your pain will be like after surgery.  Following surgery it is unrealistic to expect you will not have pain.   Pain is how our bodies let us know that something is wrong or cautions us to be careful.  That said, our goal is to make your pain tolerable.    Methods we may use to treat your pain include (oral or IV medications, PCAs, epidurals, nerve blocks, etc.)   While some procedures require IV pain medications for a short time after surgery, transitioning to pain medications by mouth allows for better management of pain.   Your nurse will encourage you to take oral pain medications whenever possible.  IV medications work almost immediately, but only last a short while.  Taking medications by mouth allows for a more constant level of medication in your blood stream for a longer period of time.      Once your pain is out of control it is harder to get back under control.  It is important you are aware when your next dose of pain medication is due.  If you are admitted, your nurse may write the time of your next dose on the white board in your room to help you remember.      We are interested in your pain and encourage you to inform us about aggravating factors during your visit.   Many times a simple repositioning every few hours can make a big difference.    If your physician says it is okay, do not let your pain prevent you from getting out of bed. Be sure to call your nurse for assistance prior to getting up so you do not fall.      Before surgery, please  decide your tolerable pain goal.  These faces help describe the pain ratings we use on a 0-10 scale.   Be prepared to tell us your goal and whether or not you take pain or anxiety medications at home.      BEFORE YOU COME TO THE HOSPITAL  (Pre-op instructions)  • Do not eat, drink, smoke or chew gum after midnight the night before surgery.  This also includes no mints.  • Morning of surgery take only the medicines you have been instructed with a sip of water unless otherwise instructed  by your physician.  • Do not shave, wear makeup or dark nail polish.  • Remove all jewelry including rings.  • Leave anything you consider valuable at home.  • Leave your suitcase in the car until after your surgery.  • Bring the following with you if applicable:  o Picture ID and insurance, Medicare or Medicaid cards  o Co-pay/deductible required by insurance (cash, check, credit card)  o Copy of advance directive, living will or power-of- documents if not brought to PAT  o CPAP or BIPAP mask and tubing  o Relaxation aids ( book, magazine), etc.  o Hearing aids                                 ON THE DAY OF SURGERY  · On the day of surgery check in at registration located at the main entrance of the hospital.   ? You will be registered and given a beeper with instructions where to wait in the main lobby.  ? When your beeper lights up and vibrates a member of the Outpatient Surgery staff will meet you at the double doors under the stair steps and escort you to your preoperative room.   · You may have cloth compression devices placed on your legs. These help to prevent blood clots and reduce swelling in your legs.  · An IV may be inserted into one of your veins.  · In the operating room, you may be given one or more of the following:  ? A medicine to help you relax (sedative).  ? A medicine to numb the area (local anesthetic).  ? A medicine to make you fall asleep (general anesthetic).  ? A medicine that is injected into an  "area of your body to numb everything below the injection site (regional anesthetic).  · Your surgical site will be marked or identified.  · You may be given an antibiotic through your IV to help prevent infection.  Contact a health care provider if you:  · Develop a fever of more than 100.4°F (38°C) or other feelings of illness during the 48 hours before your surgery.  · Have symptoms that get worse.  Have questions or concerns about your surgery    General Anesthesia/Surgery, Adult  General anesthesia is the use of medicines to make a person \"go to sleep\" (unconscious) for a medical procedure. General anesthesia must be used for certain procedures, and is often recommended for procedures that:  · Last a long time.  · Require you to be still or in an unusual position.  · Are major and can cause blood loss.  The medicines used for general anesthesia are called general anesthetics. As well as making you unconscious for a certain amount of time, these medicines:  · Prevent pain.  · Control your blood pressure.  · Relax your muscles.  Tell a health care provider about:  · Any allergies you have.  · All medicines you are taking, including vitamins, herbs, eye drops, creams, and over-the-counter medicines.  · Any problems you or family members have had with anesthetic medicines.  · Types of anesthetics you have had in the past.  · Any blood disorders you have.  · Any surgeries you have had.  · Any medical conditions you have.  · Any recent upper respiratory, chest, or ear infections.  · Any history of:  ? Heart or lung conditions, such as heart failure, sleep apnea, asthma, or chronic obstructive pulmonary disease (COPD).  ?  service.  ? Depression or anxiety.  · Any tobacco or drug use, including marijuana or alcohol use.  · Whether you are pregnant or may be pregnant.  What are the risks?  Generally, this is a safe procedure. However, problems may occur, including:  · Allergic reaction.  · Lung and heart " problems.  · Inhaling food or liquid from the stomach into the lungs (aspiration).  · Nerve injury.  · Air in the bloodstream, which can lead to stroke.  · Extreme agitation or confusion (delirium) when you wake up from the anesthetic.  · Waking up during your procedure and being unable to move. This is rare.  These problems are more likely to develop if you are having a major surgery or if you have an advanced or serious medical condition. You can prevent some of these complications by answering all of your health care provider's questions thoroughly and by following all instructions before your procedure.  General anesthesia can cause side effects, including:  · Nausea or vomiting.  · A sore throat from the breathing tube.  · Hoarseness.  · Wheezing or coughing.  · Shaking chills.  · Tiredness.  · Body aches.  · Anxiety.  · Sleepiness or drowsiness.  · Confusion or agitation.  RISKS AND COMPLICATIONS OF SURGERY  Your health care provider will discuss possible risks and complications with you before surgery. Common risks and complications include:    · Problems due to the use of anesthetics.  · Blood loss and replacement (does not apply to minor surgical procedures).  · Temporary increase in pain due to surgery.  · Uncorrected pain or problems that the surgery was meant to correct.  · Infection.  · New damage.    What happens before the procedure?    Medicines  Ask your health care provider about:  · Changing or stopping your regular medicines. This is especially important if you are taking diabetes medicines or blood thinners.  · Taking medicines such as aspirin and ibuprofen. These medicines can thin your blood. Do not take these medicines unless your health care provider tells you to take them.  · Taking over-the-counter medicines, vitamins, herbs, and supplements. Do not take these during the week before your procedure unless your health care provider approves them.  General instructions  · Starting 3-6 weeks  before the procedure, do not use any products that contain nicotine or tobacco, such as cigarettes and e-cigarettes. If you need help quitting, ask your health care provider.  · If you brush your teeth on the morning of the procedure, make sure to spit out all of the toothpaste.  · Tell your health care provider if you become ill or develop a cold, cough, or fever.  · If instructed by your health care provider, bring your sleep apnea device with you on the day of your surgery (if applicable).  · Ask your health care provider if you will be going home the same day, the following day, or after a longer hospital stay.  ? Plan to have someone take you home from the hospital or clinic.  ? Plan to have a responsible adult care for you for at least 24 hours after you leave the hospital or clinic. This is important.  What happens during the procedure?  · You will be given anesthetics through both of the following:  ? A mask placed over your nose and mouth.  ? An IV in one of your veins.  · You may receive a medicine to help you relax (sedative).  · After you are unconscious, a breathing tube may be inserted down your throat to help you breathe. This will be removed before you wake up.  · An anesthesia specialist will stay with you throughout your procedure. He or she will:  ? Keep you comfortable and safe by continuing to give you medicines and adjusting the amount of medicine that you get.  ? Monitor your blood pressure, pulse, and oxygen levels to make sure that the anesthetics do not cause any problems.  The procedure may vary among health care providers and hospitals.  What happens after the procedure?  · Your blood pressure, temperature, heart rate, breathing rate, and blood oxygen level will be monitored until the medicines you were given have worn off.  · You will wake up in a recovery area. You may wake up slowly.  · If you feel anxious or agitated, you may be given medicine to help you calm down.  · If you will be  going home the same day, your health care provider may check to make sure you can walk, drink, and urinate.  · Your health care provider will treat any pain or side effects you have before you go home.  · Do not drive for 24 hours if you were given a sedative.  Summary  · General anesthesia is used to keep you still and prevent pain during a procedure.  · It is important to tell your healthcare provider about your medical history and any surgeries you have had, and previous experience with anesthesia.  · Follow your healthcare provider’s instructions about when to stop eating, drinking, or taking certain medicines before your procedure.  · Plan to have someone take you home from the hospital or clinic.  This information is not intended to replace advice given to you by your health care provider. Make sure you discuss any questions you have with your health care provider.  Document Released: 03/26/2009 Document Revised: 08/03/2018 Document Reviewed: 08/03/2018  [employer] Interactive Patient Education © 2019 [employer]      Fall Prevention in Hospitals, Adult  As a hospital patient, your condition and the treatments you receive can increase your risk for falls. Some additional risk factors for falls in a hospital include:  · Being in an unfamiliar environment.  · Being on bed rest.  · Your surgery.  · Taking certain medicines.  · Your tubing requirements, such as intravenous (IV) therapy or catheters.  It is important that you learn how to decrease fall risks while at the hospital. Below are important tips that can help prevent falls.  SAFETY TIPS FOR PREVENTING FALLS  Talk about your risk of falling.  · Ask your health care provider why you are at risk for falling. Is it your medicine, illness, tubing placement, or something else?  · Make a plan with your health care provider to keep you safe from falls.  · Ask your health care provider or pharmacist about side effects of your medicines. Some medicines can make you  dizzy or affect your coordination.  Ask for help.  · Ask for help before getting out of bed. You may need to press your call button.  · Ask for assistance in getting safely to the toilet.  · Ask for a walker or cane to be put at your bedside. Ask that most of the side rails on your bed be placed up before your health care provider leaves the room.  · Ask family or friends to sit with you.  · Ask for things that are out of your reach, such as your glasses, hearing aids, telephone, bedside table, or call button.  Follow these tips to avoid falling:  · Stay lying or seated, rather than standing, while waiting for help.  · Wear rubber-soled slippers or shoes whenever you walk in the hospital.  · Avoid quick, sudden movements.  ¨ Change positions slowly.  ¨ Sit on the side of your bed before standing.  ¨ Stand up slowly and wait before you start to walk.  · Let your health care provider know if there is a spill on the floor.  · Pay careful attention to the medical equipment, electrical cords, and tubes around you.  · When you need help, use your call button by your bed or in the bathroom. Wait for one of your health care providers to help you.  · If you feel dizzy or unsure of your footing, return to bed and wait for assistance.  · Avoid being distracted by the TV, telephone, or another person in your room.  · Do not lean or support yourself on rolling objects, such as IV poles or bedside tables.     This information is not intended to replace advice given to you by your health care provider. Make sure you discuss any questions you have with your health care provider.     Document Released: 12/15/2001 Document Revised: 01/08/2016 Document Reviewed: 08/25/2013  [employer] Interactive Patient Education ©2016 [employer]       Surgical Site Infections FAQs  What is a Surgical Site Infection (SSI)?  A surgical site infection is an infection that occurs after surgery in the part of the body where the surgery took place. Most  patients who have surgery do not develop an infection. However, infections develop in about 1 to 3 out of every 100 patients who have surgery.  Some of the common symptoms of a surgical site infection are:  · Redness and pain around the area where you had surgery  · Drainage of cloudy fluid from your surgical wound  · Fever  Can SSIs be treated?  Yes. Most surgical site infections can be treated with antibiotics. The antibiotic given to you depends on the bacteria (germs) causing the infection. Sometimes patients with SSIs also need another surgery to treat the infection.  What are some of the things that hospitals are doing to prevent SSIs?  To prevent SSIs, doctors, nurses, and other healthcare providers:  · Clean their hands and arms up to their elbows with an antiseptic agent just before the surgery.  · Clean their hands with soap and water or an alcohol-based hand rub before and after caring for each patient.  · May remove some of your hair immediately before your surgery using electric clippers if the hair is in the same area where the procedure will occur. They should not shave you with a razor.  · Wear special hair covers, masks, gowns, and gloves during surgery to keep the surgery area clean.  · Give you antibiotics before your surgery starts. In most cases, you should get antibiotics within 60 minutes before the surgery starts and the antibiotics should be stopped within 24 hours after surgery.  · Clean the skin at the site of your surgery with a special soap that kills germs.  What can I do to help prevent SSIs?  Before your surgery:  · Tell your doctor about other medical problems you may have. Health problems such as allergies, diabetes, and obesity could affect your surgery and your treatment.  · Quit smoking. Patients who smoke get more infections. Talk to your doctor about how you can quit before your surgery.  · Do not shave near where you will have surgery. Shaving with a razor can irritate your  skin and make it easier to develop an infection.  At the time of your surgery:  · Speak up if someone tries to shave you with a razor before surgery. Ask why you need to be shaved and talk with your surgeon if you have any concerns.  · Ask if you will get antibiotics before surgery.  After your surgery:  · Make sure that your healthcare providers clean their hands before examining you, either with soap and water or an alcohol-based hand rub.  · If you do not see your providers clean their hands, please ask them to do so.  · Family and friends who visit you should not touch the surgical wound or dressings.  · Family and friends should clean their hands with soap and water or an alcohol-based hand rub before and after visiting you. If you do not see them clean their hands, ask them to clean their hands.  What do I need to do when I go home from the hospital?  · Before you go home, your doctor or nurse should explain everything you need to know about taking care of your wound. Make sure you understand how to care for your wound before you leave the hospital.  · Always clean your hands before and after caring for your wound.  · Before you go home, make sure you know who to contact if you have questions or problems after you get home.  · If you have any symptoms of an infection, such as redness and pain at the surgery site, drainage, or fever, call your doctor immediately.  If you have additional questions, please ask your doctor or nurse.  Developed and co-sponsored by The Society for Healthcare Epidemiology of [place] (SHEA); Infectious Diseases Society of [place] (IDSA); American Hospital Association; Association for Professionals in Infection Control and Epidemiology (APIC); Centers for Disease Control and Prevention (CDC); and The Joint Commission.     This information is not intended to replace advice given to you by your health care provider. Make sure you discuss any questions you have with your health care  provider.     Document Released: 12/23/2014 Document Revised: 01/08/2016 Document Reviewed: 03/02/2016  [employer] Interactive Patient Education ©2016 [employer]     PATIENT/FAMILY/RESPONSIBLE PARTY VERBALIZES UNDERSTANDING OF ABOVE EDUCATION.  COPY OF PAIN SCALE GIVEN AND REVIEWED WITH VERBALIZED UNDERSTANDING.

## 2019-09-30 ENCOUNTER — ANESTHESIA EVENT (OUTPATIENT)
Dept: PERIOP | Facility: HOSPITAL | Age: 54
End: 2019-09-30

## 2019-09-30 ENCOUNTER — ANESTHESIA (OUTPATIENT)
Dept: PERIOP | Facility: HOSPITAL | Age: 54
End: 2019-09-30

## 2019-09-30 ENCOUNTER — HOSPITAL ENCOUNTER (OUTPATIENT)
Facility: HOSPITAL | Age: 54
Setting detail: HOSPITAL OUTPATIENT SURGERY
Discharge: HOME | End: 2019-09-30
Attending: OBSTETRICS & GYNECOLOGY | Admitting: OBSTETRICS & GYNECOLOGY

## 2019-09-30 VITALS
RESPIRATION RATE: 16 BRPM | HEART RATE: 66 BPM | DIASTOLIC BLOOD PRESSURE: 65 MMHG | OXYGEN SATURATION: 92 % | TEMPERATURE: 98 F | SYSTOLIC BLOOD PRESSURE: 116 MMHG

## 2019-09-30 DIAGNOSIS — N95.0 PMB (POSTMENOPAUSAL BLEEDING): ICD-10-CM

## 2019-09-30 LAB
ABO GROUP BLD: NORMAL
BLD GP AB SCN SERPL QL: NEGATIVE
RH BLD: POSITIVE
T&S EXPIRATION DATE: NORMAL

## 2019-09-30 PROCEDURE — 86850 RBC ANTIBODY SCREEN: CPT | Performed by: OBSTETRICS & GYNECOLOGY

## 2019-09-30 PROCEDURE — 25010000002 FENTANYL CITRATE (PF) 100 MCG/2ML SOLUTION: Performed by: NURSE ANESTHETIST, CERTIFIED REGISTERED

## 2019-09-30 PROCEDURE — 88305 TISSUE EXAM BY PATHOLOGIST: CPT | Performed by: OBSTETRICS & GYNECOLOGY

## 2019-09-30 PROCEDURE — 86901 BLOOD TYPING SEROLOGIC RH(D): CPT | Performed by: OBSTETRICS & GYNECOLOGY

## 2019-09-30 PROCEDURE — 25010000002 PROPOFOL 10 MG/ML EMULSION: Performed by: NURSE ANESTHETIST, CERTIFIED REGISTERED

## 2019-09-30 PROCEDURE — 25010000002 DEXAMETHASONE PER 1 MG: Performed by: NURSE ANESTHETIST, CERTIFIED REGISTERED

## 2019-09-30 PROCEDURE — 86900 BLOOD TYPING SEROLOGIC ABO: CPT | Performed by: OBSTETRICS & GYNECOLOGY

## 2019-09-30 PROCEDURE — 58558 HYSTEROSCOPY BIOPSY: CPT | Performed by: OBSTETRICS & GYNECOLOGY

## 2019-09-30 PROCEDURE — 25010000002 ONDANSETRON PER 1 MG: Performed by: NURSE ANESTHETIST, CERTIFIED REGISTERED

## 2019-09-30 PROCEDURE — 25010000002 MIDAZOLAM PER 1 MG: Performed by: NURSE ANESTHETIST, CERTIFIED REGISTERED

## 2019-09-30 RX ORDER — SODIUM CHLORIDE 0.9 % (FLUSH) 0.9 %
3 SYRINGE (ML) INJECTION AS NEEDED
Status: DISCONTINUED | OUTPATIENT
Start: 2019-09-30 | End: 2019-09-30 | Stop reason: HOSPADM

## 2019-09-30 RX ORDER — IBUPROFEN 600 MG/1
600 TABLET ORAL ONCE AS NEEDED
Status: DISCONTINUED | OUTPATIENT
Start: 2019-09-30 | End: 2019-09-30 | Stop reason: HOSPADM

## 2019-09-30 RX ORDER — SODIUM CHLORIDE, SODIUM LACTATE, POTASSIUM CHLORIDE, CALCIUM CHLORIDE 600; 310; 30; 20 MG/100ML; MG/100ML; MG/100ML; MG/100ML
100 INJECTION, SOLUTION INTRAVENOUS CONTINUOUS
Status: DISCONTINUED | OUTPATIENT
Start: 2019-09-30 | End: 2019-09-30 | Stop reason: HOSPADM

## 2019-09-30 RX ORDER — SODIUM CHLORIDE, SODIUM LACTATE, POTASSIUM CHLORIDE, CALCIUM CHLORIDE 600; 310; 30; 20 MG/100ML; MG/100ML; MG/100ML; MG/100ML
1000 INJECTION, SOLUTION INTRAVENOUS CONTINUOUS
Status: DISCONTINUED | OUTPATIENT
Start: 2019-09-30 | End: 2019-09-30 | Stop reason: HOSPADM

## 2019-09-30 RX ORDER — LABETALOL HYDROCHLORIDE 5 MG/ML
5 INJECTION, SOLUTION INTRAVENOUS
Status: DISCONTINUED | OUTPATIENT
Start: 2019-09-30 | End: 2019-09-30 | Stop reason: HOSPADM

## 2019-09-30 RX ORDER — OXYCODONE AND ACETAMINOPHEN 10; 325 MG/1; MG/1
1 TABLET ORAL ONCE AS NEEDED
Status: COMPLETED | OUTPATIENT
Start: 2019-09-30 | End: 2019-09-30

## 2019-09-30 RX ORDER — METOCLOPRAMIDE HYDROCHLORIDE 5 MG/ML
5 INJECTION INTRAMUSCULAR; INTRAVENOUS
Status: DISCONTINUED | OUTPATIENT
Start: 2019-09-30 | End: 2019-09-30 | Stop reason: HOSPADM

## 2019-09-30 RX ORDER — NALOXONE HCL 0.4 MG/ML
0.4 VIAL (ML) INJECTION AS NEEDED
Status: DISCONTINUED | OUTPATIENT
Start: 2019-09-30 | End: 2019-09-30 | Stop reason: HOSPADM

## 2019-09-30 RX ORDER — FENTANYL CITRATE 50 UG/ML
INJECTION, SOLUTION INTRAMUSCULAR; INTRAVENOUS AS NEEDED
Status: DISCONTINUED | OUTPATIENT
Start: 2019-09-30 | End: 2019-09-30 | Stop reason: SURG

## 2019-09-30 RX ORDER — OXYCODONE HYDROCHLORIDE AND ACETAMINOPHEN 5; 325 MG/1; MG/1
1-2 TABLET ORAL EVERY 4 HOURS PRN
Qty: 8 TABLET | Refills: 0 | Status: SHIPPED | OUTPATIENT
Start: 2019-09-30 | End: 2021-01-15

## 2019-09-30 RX ORDER — ONDANSETRON 2 MG/ML
4 INJECTION INTRAMUSCULAR; INTRAVENOUS ONCE AS NEEDED
Status: DISCONTINUED | OUTPATIENT
Start: 2019-09-30 | End: 2019-09-30 | Stop reason: HOSPADM

## 2019-09-30 RX ORDER — FENTANYL CITRATE 50 UG/ML
25 INJECTION, SOLUTION INTRAMUSCULAR; INTRAVENOUS AS NEEDED
Status: DISCONTINUED | OUTPATIENT
Start: 2019-09-30 | End: 2019-09-30 | Stop reason: HOSPADM

## 2019-09-30 RX ORDER — IPRATROPIUM BROMIDE AND ALBUTEROL SULFATE 2.5; .5 MG/3ML; MG/3ML
3 SOLUTION RESPIRATORY (INHALATION) ONCE AS NEEDED
Status: DISCONTINUED | OUTPATIENT
Start: 2019-09-30 | End: 2019-09-30 | Stop reason: HOSPADM

## 2019-09-30 RX ORDER — SODIUM CHLORIDE 0.9 % (FLUSH) 0.9 %
3-10 SYRINGE (ML) INJECTION AS NEEDED
Status: DISCONTINUED | OUTPATIENT
Start: 2019-09-30 | End: 2019-09-30 | Stop reason: HOSPADM

## 2019-09-30 RX ORDER — ONDANSETRON 2 MG/ML
INJECTION INTRAMUSCULAR; INTRAVENOUS AS NEEDED
Status: DISCONTINUED | OUTPATIENT
Start: 2019-09-30 | End: 2019-09-30 | Stop reason: SURG

## 2019-09-30 RX ORDER — MIDAZOLAM HYDROCHLORIDE 1 MG/ML
1 INJECTION INTRAMUSCULAR; INTRAVENOUS
Status: DISCONTINUED | OUTPATIENT
Start: 2019-09-30 | End: 2019-09-30 | Stop reason: HOSPADM

## 2019-09-30 RX ORDER — DEXAMETHASONE SODIUM PHOSPHATE 4 MG/ML
4 INJECTION, SOLUTION INTRA-ARTICULAR; INTRALESIONAL; INTRAMUSCULAR; INTRAVENOUS; SOFT TISSUE ONCE AS NEEDED
Status: COMPLETED | OUTPATIENT
Start: 2019-09-30 | End: 2019-09-30

## 2019-09-30 RX ORDER — MIDAZOLAM HYDROCHLORIDE 1 MG/ML
2 INJECTION INTRAMUSCULAR; INTRAVENOUS
Status: DISCONTINUED | OUTPATIENT
Start: 2019-09-30 | End: 2019-09-30 | Stop reason: HOSPADM

## 2019-09-30 RX ORDER — OXYCODONE AND ACETAMINOPHEN 7.5; 325 MG/1; MG/1
2 TABLET ORAL EVERY 4 HOURS PRN
Status: DISCONTINUED | OUTPATIENT
Start: 2019-09-30 | End: 2019-09-30 | Stop reason: HOSPADM

## 2019-09-30 RX ORDER — OXYCODONE HYDROCHLORIDE AND ACETAMINOPHEN 5; 325 MG/1; MG/1
1 TABLET ORAL EVERY 4 HOURS PRN
Status: DISCONTINUED | OUTPATIENT
Start: 2019-09-30 | End: 2019-09-30 | Stop reason: HOSPADM

## 2019-09-30 RX ORDER — ACETAMINOPHEN 500 MG
1000 TABLET ORAL ONCE
Status: COMPLETED | OUTPATIENT
Start: 2019-09-30 | End: 2019-09-30

## 2019-09-30 RX ORDER — SODIUM CHLORIDE 0.9 % (FLUSH) 0.9 %
3 SYRINGE (ML) INJECTION EVERY 12 HOURS SCHEDULED
Status: DISCONTINUED | OUTPATIENT
Start: 2019-09-30 | End: 2019-09-30 | Stop reason: HOSPADM

## 2019-09-30 RX ORDER — SODIUM CHLORIDE 9 MG/ML
INJECTION, SOLUTION INTRAVENOUS AS NEEDED
Status: DISCONTINUED | OUTPATIENT
Start: 2019-09-30 | End: 2019-09-30 | Stop reason: HOSPADM

## 2019-09-30 RX ORDER — PROPOFOL 10 MG/ML
VIAL (ML) INTRAVENOUS AS NEEDED
Status: DISCONTINUED | OUTPATIENT
Start: 2019-09-30 | End: 2019-09-30 | Stop reason: SURG

## 2019-09-30 RX ADMIN — LIDOCAINE HYDROCHLORIDE 60 MG: 20 INJECTION, SOLUTION INTRAVENOUS at 10:45

## 2019-09-30 RX ADMIN — PROPOFOL 150 MG: 10 INJECTION, EMULSION INTRAVENOUS at 10:45

## 2019-09-30 RX ADMIN — ONDANSETRON HYDROCHLORIDE 4 MG: 2 SOLUTION INTRAMUSCULAR; INTRAVENOUS at 10:55

## 2019-09-30 RX ADMIN — OXYCODONE HYDROCHLORIDE AND ACETAMINOPHEN 1 TABLET: 10; 325 TABLET ORAL at 11:43

## 2019-09-30 RX ADMIN — FENTANYL CITRATE 100 MCG: 50 INJECTION, SOLUTION INTRAMUSCULAR; INTRAVENOUS at 10:45

## 2019-09-30 RX ADMIN — SODIUM CHLORIDE, POTASSIUM CHLORIDE, SODIUM LACTATE AND CALCIUM CHLORIDE 1000 ML: 600; 310; 30; 20 INJECTION, SOLUTION INTRAVENOUS at 09:52

## 2019-09-30 RX ADMIN — DEXAMETHASONE SODIUM PHOSPHATE 4 MG: 4 INJECTION, SOLUTION INTRAMUSCULAR; INTRAVENOUS at 10:25

## 2019-09-30 RX ADMIN — MIDAZOLAM HYDROCHLORIDE 2 MG: 1 INJECTION, SOLUTION INTRAMUSCULAR; INTRAVENOUS at 10:25

## 2019-09-30 RX ADMIN — ACETAMINOPHEN 1000 MG: 500 TABLET, FILM COATED ORAL at 10:25

## 2019-09-30 NOTE — ANESTHESIA PREPROCEDURE EVALUATION
Anesthesia Evaluation     Patient summary reviewed   history of anesthetic complications: PONV  NPO Solid Status: > 8 hours  NPO Liquid Status: > 8 hours           Airway   Mallampati: I  TM distance: >3 FB  Neck ROM: full  No difficulty expected  Dental - normal exam     Pulmonary - normal exam   (+) recent URI,   Cardiovascular - normal exam  Exercise tolerance: excellent (>7 METS)    (+) dysrhythmias PVC,       Neuro/Psych  (+) psychiatric history Anxiety,     GI/Hepatic/Renal/Endo - negative ROS     Musculoskeletal     Abdominal  - normal exam   Substance History   (+) alcohol use,      OB/GYN negative ob/gyn ROS         Other   (+) arthritis                     Anesthesia Plan    ASA 2     general     intravenous induction   Anesthetic plan, all risks, benefits, and alternatives have been provided, discussed and informed consent has been obtained with: patient.

## 2019-09-30 NOTE — ANESTHESIA PROCEDURE NOTES
Airway  Urgency: elective    Date/Time: 9/30/2019 10:48 AM  Airway not difficult    General Information and Staff    Patient location during procedure: OR  CRNA: Gasper Hardin CRNA    Indications and Patient Condition  Indications for airway management: airway protection    Preoxygenated: yes  Mask difficulty assessment: 1 - vent by mask    Final Airway Details  Final airway type: supraglottic airway      Successful airway: classic and ProSeal  Size 3    Number of attempts at approach: 1  Assessment: lips, teeth, and gum same as pre-op

## 2019-10-01 LAB
LAB AP CASE REPORT: NORMAL
LAB AP DIAGNOSIS COMMENT: NORMAL
PATH REPORT.FINAL DX SPEC: NORMAL
PATH REPORT.GROSS SPEC: NORMAL

## 2019-10-17 ENCOUNTER — OFFICE VISIT (OUTPATIENT)
Dept: OBSTETRICS AND GYNECOLOGY | Facility: CLINIC | Age: 54
End: 2019-10-17

## 2019-10-17 VITALS
WEIGHT: 173 LBS | BODY MASS INDEX: 27.15 KG/M2 | SYSTOLIC BLOOD PRESSURE: 124 MMHG | DIASTOLIC BLOOD PRESSURE: 80 MMHG | HEIGHT: 67 IN

## 2019-10-17 DIAGNOSIS — Z09 S/P GYNECOLOGICAL SURGERY, FOLLOW-UP EXAM: Primary | ICD-10-CM

## 2019-10-17 PROCEDURE — 99024 POSTOP FOLLOW-UP VISIT: CPT | Performed by: OBSTETRICS & GYNECOLOGY

## 2019-10-17 NOTE — PROGRESS NOTES
"Subjective   Chief Complaint   Patient presents with   • Post-op     pt here today for 2 week post op D&C hysteroscopy. pt voices no concerns.      Patricia Palma is a 54 y.o. year old  presenting to be seen for her post-operative visit.  She had a hysteroscopy/D&C 2 weeks ago.  Currently she reports pain for several days after surgery, but is not having any pain at this time.  She is not having any vaginal bleeding.    No Additional Complaints Reported    The following portions of the patient's history were reviewed and updated as appropriate:current medications and allergies    Review of Systems   Constitutional: Negative for activity change and unexpected weight change.   Respiratory: Negative for shortness of breath.    Cardiovascular: Negative for chest pain.   Genitourinary: Negative for pelvic pain and vaginal bleeding.         Objective   /80   Ht 170.2 cm (67\")   Wt 78.5 kg (173 lb)   BMI 27.10 kg/m²     General:  well developed; well nourished  no acute distress   Abdomen: soft, non-tender; no masses   Pelvis: Not performed.     Tissue Pathology Exam: [id number]   Order: [id number]   Status:  Final result   Visible to patient:  Yes (MyChart)   Next appt:  None   Dx:  PMB (postmenopausal bleeding)   Component    Case Report   Surgical Pathology Report                         Case: [id number]                                   Authorizing Provider:  Kaley August MD         Collected:           2019 11:09 AM           Ordering Location:     [facility] OR  Received:            2019 03:47 PM           Pathologist:           Turner Carlisle MD                                                         Specimens:   1) - Endocervix, [facility]                                                                                 2) - Endometrial Curettings, [facility]                                                           Final Diagnosis   1.  Endocervical curettings:       Endocervical " mucosa, mucus, and blood, negative for dysplasia     2.  Endometrial curettings:       Disordered proliferative endometrium       Focal stromal changes consistent with endometrial polyp(s)            Electronically signed by Turner Carlisle MD on 10/1/2019 at 1803                  Assessment   1. Pt is 2 weeks s/p hysteroscopy/D&C  2. Pathology reviewed as benign     Plan   1. RTO for PAP when due next month    No orders of the defined types were placed in this encounter.         This note was electronically signed.    Kaley August MD  October 17, 2019

## 2019-11-01 DIAGNOSIS — Z78.0 MENOPAUSE: ICD-10-CM

## 2019-11-01 RX ORDER — ESTRADIOL AND NORETHINDRONE ACETATE .5; .1 MG/1; MG/1
TABLET ORAL
Qty: 28 TABLET | Refills: 0 | Status: SHIPPED | OUTPATIENT
Start: 2019-11-01 | End: 2019-12-05 | Stop reason: SDUPTHER

## 2019-12-05 ENCOUNTER — OFFICE VISIT (OUTPATIENT)
Dept: OBSTETRICS AND GYNECOLOGY | Facility: CLINIC | Age: 54
End: 2019-12-05

## 2019-12-05 VITALS
SYSTOLIC BLOOD PRESSURE: 118 MMHG | DIASTOLIC BLOOD PRESSURE: 72 MMHG | WEIGHT: 179 LBS | BODY MASS INDEX: 28.09 KG/M2 | HEIGHT: 67 IN

## 2019-12-05 DIAGNOSIS — Z78.0 MENOPAUSE: ICD-10-CM

## 2019-12-05 DIAGNOSIS — Z78.9 NON-SMOKER: ICD-10-CM

## 2019-12-05 DIAGNOSIS — F41.9 ANXIETY: ICD-10-CM

## 2019-12-05 DIAGNOSIS — Z01.419 WELL WOMAN EXAM WITH ROUTINE GYNECOLOGICAL EXAM: Primary | ICD-10-CM

## 2019-12-05 PROCEDURE — 87624 HPV HI-RISK TYP POOLED RSLT: CPT | Performed by: OBSTETRICS & GYNECOLOGY

## 2019-12-05 PROCEDURE — 99396 PREV VISIT EST AGE 40-64: CPT | Performed by: OBSTETRICS & GYNECOLOGY

## 2019-12-05 PROCEDURE — G0123 SCREEN CERV/VAG THIN LAYER: HCPCS | Performed by: OBSTETRICS & GYNECOLOGY

## 2019-12-05 RX ORDER — ESTRADIOL AND NORETHINDRONE ACETATE .5; .1 MG/1; MG/1
1 TABLET ORAL DAILY
Qty: 90 TABLET | Refills: 3 | Status: SHIPPED | OUTPATIENT
Start: 2019-12-05 | End: 2020-03-10 | Stop reason: SDUPTHER

## 2019-12-05 RX ORDER — SERTRALINE HYDROCHLORIDE 25 MG/1
25 TABLET, FILM COATED ORAL DAILY
Qty: 90 TABLET | Refills: 3 | Status: SHIPPED | OUTPATIENT
Start: 2019-12-05 | End: 2020-04-08 | Stop reason: SDUPTHER

## 2019-12-05 NOTE — PROGRESS NOTES
Subjective   Chief Complaint   Patient presents with   • Gynecologic Exam     pt here today for annual exam. pt voices no concerns.      Patricia Palma is a 54 y.o. year old  menopausal female presenting to be seen for her annual exam.  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  She is also trying to run a business.  The patient denies any vaginal bleeding since her D&C this fall. Hot flashes and night sweats are not a significant problem as long as she continues her estrodiol/norethrindrone.    SEXUAL Hx:  She is sexually active.  In the past year there has not been new sexual partners.     HEALTH Hx:  She exercises regularly: yes.  The patient reports regular self breast exams: yes  She has noticed changes in height: no.    No Additional Complaints Reported    The following portions of the patient's history were reviewed and updated as appropriate:problem list, current medications, allergies, past family history, past medical history, past social history and past surgical history.    Social History    Tobacco Use      Smoking status: Never Smoker      Smokeless tobacco: Never Used    Review of Systems   Constitutional: Negative for activity change and unexpected weight change.   HENT: Negative for congestion.    Eyes: Positive for visual disturbance (wears glasses).   Respiratory: Negative for shortness of breath.    Cardiovascular: Negative for chest pain.   Gastrointestinal: Negative for abdominal pain, blood in stool, constipation and diarrhea.   Endocrine: Negative for cold intolerance and heat intolerance.   Genitourinary: Positive for enuresis (very rare LINDSAY with cough). Negative for difficulty urinating, dyspareunia, pelvic pain and vaginal discharge.   Musculoskeletal: Positive for arthralgias and back pain. Negative for neck pain and neck stiffness.        Patient reports  "back/joint pain appropriate for age   Skin: Negative for rash.   Neurological: Positive for dizziness (vestibular, can usually \"reset\" with exercises). Negative for headaches.   Psychiatric/Behavioral: Negative for sleep disturbance. The patient is nervous/anxious.          Objective   /72   Ht 170.2 cm (67\")   Wt 81.2 kg (179 lb)   BMI 28.04 kg/m²   Physical Exam   Constitutional: She is oriented to person, place, and time. She appears well-developed and well-nourished. No distress.   HENT:   Head: Normocephalic and atraumatic.   Eyes: EOM are normal.   Neck: Normal range of motion. Neck supple.   Cardiovascular: Normal rate and regular rhythm.   No murmur heard.  Pulmonary/Chest: Effort normal and breath sounds normal. Right breast exhibits no inverted nipple and no mass. Left breast exhibits no inverted nipple and no mass.   Abdominal: Soft. She exhibits no distension. There is no tenderness.   Genitourinary: Vagina normal and uterus normal. Rectal exam shows no external hemorrhoid and anal tone normal. No breast tenderness or discharge. Pelvic exam was performed with patient supine. There is no tenderness or lesion on the right labia. There is no tenderness or lesion on the left labia. Cervix exhibits no motion tenderness, no discharge and no friability. Right adnexum displays no tenderness and no fullness. Left adnexum displays no tenderness and no fullness. No tenderness or bleeding in the vagina. No vaginal discharge found.   Musculoskeletal: Normal range of motion. She exhibits no edema.   Neurological: She is alert and oriented to person, place, and time.   Skin: Skin is warm and dry.   Psychiatric: She has a normal mood and affect. Her behavior is normal. Judgment normal.   Nursing note and vitals reviewed.           Assessment & Plan    Patricia was seen today for gynecologic exam.    Diagnoses and all orders for this visit:    Well woman exam with routine gynecological exam: Exam unremarkable.  " Patient with D&C this fall for PMB (pathology showed only benign polyps), but has had no further bleeding.  She has routine screening labs with her PCP and her colonoscopy is up-to-date until 2028.  Mammogram and DEXA ordered; Patricia reports regular SBE.  Zoloft started for anxiety.  She will call back if she experiences any side effects, and RTO in 3 months to discuss.  Continuing HRT for menopause symptoms.  -     Liquid-based Pap Smear, Screening  -     HPV DNA Probe, Direct - ThinPrep Vial, Cervix    BMI 28.0-28.9,adult    Non-smoker    Anxiety  -     sertraline (ZOLOFT) 25 MG tablet; Take 1 tablet by mouth Daily.    Menopause  -     Estradiol-Norethindrone Acet 0.5-0.1 MG per tablet; Take 1 tablet by mouth Daily.  -     Mammo Screening Bilateral With CAD; Future  -     DEXA Bone Density Axial; Future        This note was electronically signed.    Kaley August MD  12/5/2019  10:00 AM

## 2019-12-06 LAB
GEN CATEG CVX/VAG CYTO-IMP: NORMAL
HPV I/H RISK 4 DNA CVX QL PROBE+SIG AMP: NOT DETECTED
LAB AP CASE REPORT: NORMAL
LAB AP GYN ADDITIONAL INFORMATION: NORMAL
PATH INTERP SPEC-IMP: NORMAL
STAT OF ADQ CVX/VAG CYTO-IMP: NORMAL

## 2020-02-04 ENCOUNTER — TELEPHONE (OUTPATIENT)
Dept: CARDIOLOGY | Age: 55
End: 2020-02-04

## 2020-02-04 ENCOUNTER — NURSE TRIAGE (OUTPATIENT)
Dept: OTHER | Facility: CLINIC | Age: 55
End: 2020-02-04

## 2020-02-04 NOTE — TELEPHONE ENCOUNTER
Reason for Disposition   Heart beating very rapidly (e.g., > 140 / minute) and not present now (EXCEPTION: during exercise)    Answer Assessment - Initial Assessment Questions  1. DESCRIPTION: \"Please describe your heart rate or heart beat that you are having\" (e.g., fast/slow, regular/irregular, skipped or extra beats, \"palpitations\")      Shaking, racing, pounding \"helicopter\"  2. ONSET: \"When did it start? \" (Minutes, hours or days)       Last few nights it has happened, Happened Sunday and Tuesday. Has happened 3 times in the last month. 3. DURATION: \"How long does it last\" (e.g., seconds, minutes, hours)      Unsure how long it lasts, wakes her up  4. PATTERN \"Does it come and go, or has it been constant since it started? \"  \"Does it get worse with exertion? \"   \"Are you feeling it now? \"   Doesn't notice during the day, she is pretty active during the day. 5. TAP: \"Using your hand, can you tap out what you are feeling on a chair or table in front of you, so that I can hear? \" (Note: not all patients can do this)          6. HEART RATE: \"Can you tell me your heart rate? \" \"How many beats in 15 seconds? \"  (Note: not all patients can do this)          7. RECURRENT SYMPTOM: \"Have you ever had this before? \" If so, ask: \"When was the last time? \" and \"What happened that time? \"       Only in the past month  8. CAUSE: \"What do you think is causing the palpitations? \"        9. CARDIAC HISTORY: \"Do you have any history of heart disease? \" (e.g., heart attack, angina, bypass surgery, angioplasty, arrhythmia)       No cardiac history  10. OTHER SYMPTOMS: \"Do you have any other symptoms? \" (e.g., dizziness, chest pain, sweating, difficulty breathing)       Sweating happens with the palpitations  11. PREGNANCY: \"Is there any chance you are pregnant? \" \"When was your last menstrual period? \"    Protocols used: HEART RATE AND HEARTBEAT QUESTIONS-ADULT-[place]    Received call from [facility].       Pt calling c/o intermittent heart palpitations over the past month. Will wake in the middle of the night with her heart feeling like \"helicopter blades\". Heart will be racing, pounding and shaking. Has happened about 3 times over the past month. Currently feels fine. No chest pain, no sob. Recommend pt be seen in office today, call back if any new or worsening symptoms. Call soft transferred to [address] to schedule appointment. Please do not respond to the triage nurse through this encounter. Any subsequent communication should be directly with the patient.

## 2020-02-04 NOTE — TELEPHONE ENCOUNTER
Received a call from [facility] stating that Dr. Jocelyn kim was on the phone with referral information for patient. The patient told Dr. Jocelyn kim that Dr. Iain Moreno would see her today. There is no documentation in epic to support this other than the nurse triage in [place] advising that patient be seen today, but it is not specified who needed to see patient. Lisbet Hussein from Dr. Jocelyn kim was calling because she said someone called her back from the office but she couldn't provide a name. Advised that there was no appointment made for this patient. She said she would fax records over so patient can be established as a new patient.

## 2020-03-10 ENCOUNTER — TELEPHONE (OUTPATIENT)
Dept: OBSTETRICS AND GYNECOLOGY | Facility: CLINIC | Age: 55
End: 2020-03-10

## 2020-03-10 DIAGNOSIS — Z78.0 MENOPAUSE: ICD-10-CM

## 2020-03-10 RX ORDER — ESTRADIOL AND NORETHINDRONE ACETATE .5; .1 MG/1; MG/1
1 TABLET ORAL DAILY
Qty: 90 TABLET | Refills: 0 | Status: SHIPPED | OUTPATIENT
Start: 2020-03-10 | End: 2020-04-08 | Stop reason: SDUPTHER

## 2020-03-10 NOTE — TELEPHONE ENCOUNTER
Pt returns call.  Notified.   Recommended she speak to provider when has appt  4/9.  Pt does need refill - order sent to [facility]

## 2020-03-10 NOTE — TELEPHONE ENCOUNTER
Patricia calls/leaves message.  She has been taking:     Estradiol-Norethindrone Acet 0.5-0.1 MG per tablet; Take 1 tablet by mouth Daily.    However, in the past she states it was less expensive for her to get higher dose (double the dosage) and break pills  in half (so they lasted longer.)    She is asking if this can be ordered again.    Left message for pt to call.   Explain pharm will question why half the pills when medication comes in needed dose, or they will only fill quantity of 45 (half the 90 day supply.)

## 2020-03-17 ENCOUNTER — APPOINTMENT (OUTPATIENT)
Dept: BONE DENSITY | Facility: HOSPITAL | Age: 55
End: 2020-03-17

## 2020-04-08 DIAGNOSIS — Z12.39 BREAST CANCER SCREENING: Primary | ICD-10-CM

## 2020-04-08 DIAGNOSIS — F41.9 ANXIETY: ICD-10-CM

## 2020-04-08 DIAGNOSIS — Z78.0 MENOPAUSE: ICD-10-CM

## 2020-04-08 RX ORDER — ESTRADIOL AND NORETHINDRONE ACETATE .5; .1 MG/1; MG/1
1 TABLET ORAL DAILY
Qty: 90 TABLET | Refills: 0 | Status: SHIPPED | OUTPATIENT
Start: 2020-04-08 | End: 2020-11-30

## 2020-04-08 RX ORDER — SERTRALINE HYDROCHLORIDE 25 MG/1
25 TABLET, FILM COATED ORAL DAILY
Qty: 90 TABLET | Refills: 3 | Status: SHIPPED | OUTPATIENT
Start: 2020-04-08 | End: 2021-01-15 | Stop reason: SDUPTHER

## 2020-05-12 ENCOUNTER — APPOINTMENT (OUTPATIENT)
Dept: BONE DENSITY | Facility: HOSPITAL | Age: 55
End: 2020-05-12

## 2020-05-12 ENCOUNTER — APPOINTMENT (OUTPATIENT)
Dept: MAMMOGRAPHY | Facility: HOSPITAL | Age: 55
End: 2020-05-12

## 2020-05-20 ENCOUNTER — HOSPITAL ENCOUNTER (OUTPATIENT)
Dept: MAMMOGRAPHY | Facility: HOSPITAL | Age: 55
Discharge: HOME | End: 2020-05-20
Admitting: OBSTETRICS & GYNECOLOGY

## 2020-05-20 ENCOUNTER — HOSPITAL ENCOUNTER (OUTPATIENT)
Dept: BONE DENSITY | Facility: HOSPITAL | Age: 55
Discharge: HOME | End: 2020-05-20

## 2020-05-20 DIAGNOSIS — Z78.0 MENOPAUSE: ICD-10-CM

## 2020-05-20 DIAGNOSIS — Z12.39 BREAST CANCER SCREENING: ICD-10-CM

## 2020-05-20 PROCEDURE — 77067 SCR MAMMO BI INCL CAD: CPT

## 2020-05-20 PROCEDURE — 77080 DXA BONE DENSITY AXIAL: CPT

## 2020-05-20 PROCEDURE — 77063 BREAST TOMOSYNTHESIS BI: CPT

## 2020-11-30 DIAGNOSIS — Z78.0 MENOPAUSE: ICD-10-CM

## 2020-11-30 RX ORDER — ESTRADIOL AND NORETHINDRONE ACETATE .5; .1 MG/1; MG/1
TABLET ORAL
Qty: 84 TABLET | Refills: 0 | Status: SHIPPED | OUTPATIENT
Start: 2020-11-30 | End: 2021-01-15 | Stop reason: SDUPTHER

## 2021-01-15 ENCOUNTER — OFFICE VISIT (OUTPATIENT)
Dept: OBSTETRICS AND GYNECOLOGY | Facility: CLINIC | Age: 56
End: 2021-01-15

## 2021-01-15 VITALS
DIASTOLIC BLOOD PRESSURE: 70 MMHG | SYSTOLIC BLOOD PRESSURE: 118 MMHG | BODY MASS INDEX: 29.25 KG/M2 | HEIGHT: 68 IN | WEIGHT: 193 LBS

## 2021-01-15 DIAGNOSIS — Z78.0 MENOPAUSE: ICD-10-CM

## 2021-01-15 DIAGNOSIS — Z12.31 BREAST CANCER SCREENING BY MAMMOGRAM: ICD-10-CM

## 2021-01-15 DIAGNOSIS — F41.9 ANXIETY: ICD-10-CM

## 2021-01-15 DIAGNOSIS — Z01.419 WELL WOMAN EXAM WITH ROUTINE GYNECOLOGICAL EXAM: Primary | ICD-10-CM

## 2021-01-15 DIAGNOSIS — Z78.9 NON-SMOKER: ICD-10-CM

## 2021-01-15 PROCEDURE — 87624 HPV HI-RISK TYP POOLED RSLT: CPT | Performed by: OBSTETRICS & GYNECOLOGY

## 2021-01-15 PROCEDURE — G0123 SCREEN CERV/VAG THIN LAYER: HCPCS | Performed by: OBSTETRICS & GYNECOLOGY

## 2021-01-15 PROCEDURE — 99396 PREV VISIT EST AGE 40-64: CPT | Performed by: OBSTETRICS & GYNECOLOGY

## 2021-01-15 RX ORDER — ONDANSETRON 4 MG/1
TABLET, ORALLY DISINTEGRATING ORAL
COMMUNITY
Start: 2020-12-02

## 2021-01-15 RX ORDER — ESTRADIOL AND NORETHINDRONE ACETATE .5; .1 MG/1; MG/1
1 TABLET ORAL DAILY
Qty: 84 TABLET | Refills: 0 | Status: SHIPPED | OUTPATIENT
Start: 2021-01-15 | End: 2021-06-01

## 2021-01-15 RX ORDER — SERTRALINE HYDROCHLORIDE 25 MG/1
25 TABLET, FILM COATED ORAL DAILY
Qty: 90 TABLET | Refills: 3 | Status: SHIPPED | OUTPATIENT
Start: 2021-01-15 | End: 2022-04-01 | Stop reason: SDUPTHER

## 2021-05-25 ENCOUNTER — HOSPITAL ENCOUNTER (OUTPATIENT)
Dept: MAMMOGRAPHY | Facility: HOSPITAL | Age: 56
Discharge: HOME | End: 2021-05-25
Admitting: OBSTETRICS & GYNECOLOGY

## 2021-05-25 DIAGNOSIS — Z12.31 BREAST CANCER SCREENING BY MAMMOGRAM: ICD-10-CM

## 2021-05-25 PROCEDURE — 77067 SCR MAMMO BI INCL CAD: CPT

## 2021-05-25 PROCEDURE — 77063 BREAST TOMOSYNTHESIS BI: CPT

## 2021-05-28 DIAGNOSIS — Z78.0 MENOPAUSE: ICD-10-CM

## 2021-06-01 RX ORDER — ESTRADIOL AND NORETHINDRONE ACETATE .5; .1 MG/1; MG/1
TABLET ORAL
Qty: 84 TABLET | Refills: 3 | Status: SHIPPED | OUTPATIENT
Start: 2021-06-01 | End: 2022-05-20 | Stop reason: SDUPTHER

## 2022-03-02 DIAGNOSIS — F41.9 ANXIETY: ICD-10-CM

## 2022-03-02 RX ORDER — SERTRALINE HYDROCHLORIDE 25 MG/1
TABLET, FILM COATED ORAL
Qty: 90 TABLET | Refills: 3 | OUTPATIENT
Start: 2022-03-02

## 2022-04-01 DIAGNOSIS — F41.9 ANXIETY: ICD-10-CM

## 2022-04-01 RX ORDER — SERTRALINE HYDROCHLORIDE 25 MG/1
25 TABLET, FILM COATED ORAL DAILY
Qty: 90 TABLET | Refills: 0 | Status: SHIPPED | OUTPATIENT
Start: 2022-04-01 | End: 2022-06-02 | Stop reason: SDUPTHER

## 2022-05-20 DIAGNOSIS — Z78.0 MENOPAUSE: ICD-10-CM

## 2022-05-20 RX ORDER — ESTRADIOL AND NORETHINDRONE ACETATE .5; .1 MG/1; MG/1
1 TABLET ORAL DAILY
Qty: 30 TABLET | Refills: 0 | Status: SHIPPED | OUTPATIENT
Start: 2022-05-20 | End: 2022-06-02 | Stop reason: SDUPTHER

## 2022-05-31 ENCOUNTER — TRANSCRIBE ORDERS (OUTPATIENT)
Dept: ADMINISTRATIVE | Facility: HOSPITAL | Age: 57
End: 2022-05-31

## 2022-05-31 DIAGNOSIS — Z12.31 ENCOUNTER FOR SCREENING MAMMOGRAM FOR MALIGNANT NEOPLASM OF BREAST: Primary | ICD-10-CM

## 2022-06-02 ENCOUNTER — HOSPITAL ENCOUNTER (OUTPATIENT)
Dept: MAMMOGRAPHY | Facility: HOSPITAL | Age: 57
Discharge: HOME | End: 2022-06-02
Admitting: OBSTETRICS & GYNECOLOGY

## 2022-06-02 ENCOUNTER — OFFICE VISIT (OUTPATIENT)
Dept: OBSTETRICS AND GYNECOLOGY | Facility: CLINIC | Age: 57
End: 2022-06-02

## 2022-06-02 VITALS
BODY MASS INDEX: 29.1 KG/M2 | WEIGHT: 192 LBS | DIASTOLIC BLOOD PRESSURE: 78 MMHG | SYSTOLIC BLOOD PRESSURE: 130 MMHG | HEIGHT: 68 IN

## 2022-06-02 DIAGNOSIS — Z78.0 MENOPAUSE: ICD-10-CM

## 2022-06-02 DIAGNOSIS — F41.9 ANXIETY: ICD-10-CM

## 2022-06-02 DIAGNOSIS — Z01.419 WOMEN'S ANNUAL ROUTINE GYNECOLOGICAL EXAMINATION: Primary | ICD-10-CM

## 2022-06-02 DIAGNOSIS — Z12.31 ENCOUNTER FOR SCREENING MAMMOGRAM FOR MALIGNANT NEOPLASM OF BREAST: ICD-10-CM

## 2022-06-02 PROCEDURE — 99396 PREV VISIT EST AGE 40-64: CPT | Performed by: NURSE PRACTITIONER

## 2022-06-02 PROCEDURE — 87624 HPV HI-RISK TYP POOLED RSLT: CPT | Performed by: NURSE PRACTITIONER

## 2022-06-02 PROCEDURE — 77063 BREAST TOMOSYNTHESIS BI: CPT

## 2022-06-02 PROCEDURE — 99213 OFFICE O/P EST LOW 20 MIN: CPT | Performed by: NURSE PRACTITIONER

## 2022-06-02 PROCEDURE — 77067 SCR MAMMO BI INCL CAD: CPT

## 2022-06-02 PROCEDURE — G0123 SCREEN CERV/VAG THIN LAYER: HCPCS | Performed by: NURSE PRACTITIONER

## 2022-06-02 RX ORDER — METOPROLOL SUCCINATE 25 MG/1
25 TABLET, EXTENDED RELEASE ORAL EVERY 12 HOURS SCHEDULED
COMMUNITY
Start: 2021-01-22 | End: 2022-06-02 | Stop reason: SDUPTHER

## 2022-06-02 RX ORDER — CETIRIZINE HYDROCHLORIDE 10 MG/1
TABLET ORAL
COMMUNITY
End: 2022-06-02 | Stop reason: SDUPTHER

## 2022-06-02 RX ORDER — ESTRADIOL AND NORETHINDRONE ACETATE .5; .1 MG/1; MG/1
1 TABLET ORAL DAILY
Qty: 90 TABLET | Refills: 2 | Status: SHIPPED | OUTPATIENT
Start: 2022-06-02 | End: 2023-02-22

## 2022-06-02 RX ORDER — SERTRALINE HYDROCHLORIDE 25 MG/1
TABLET, FILM COATED ORAL
COMMUNITY
End: 2022-06-02 | Stop reason: SDUPTHER

## 2022-06-02 RX ORDER — SERTRALINE HYDROCHLORIDE 25 MG/1
25 TABLET, FILM COATED ORAL DAILY
Qty: 90 TABLET | Refills: 3 | Status: SHIPPED | OUTPATIENT
Start: 2022-06-02

## 2022-06-02 NOTE — PROGRESS NOTES
Chief Complaint   Patient presents with   • Annual Exam     Patient is here for annual exam last pap was performed on 01/15/21 and was normal. Last mammogram was performed 06/02/2022. Patient has no complaints or concerns at this time.        History:  Patricia Palma is a 57 y.o. female who presents today for follow-up for evaluation of the above:    HPI     Patricia Palma is a 57 y.o. female here today for annual GYN examination. She is menopausal and has not had any recent abnormal Pap smears. She denies any vaginal discharge or bleeding. She is on hormone replacement therapy.  Her last mammogram was in 06/2022 and read as BIRADS 1. She has no specific complaints today and denies abdominal or pelvic pain.   Mood well controlled on current dose of zoloft.           ROS:  Review of Systems   Constitutional: Negative for fatigue and unexpected weight change.   HENT: Negative.    Eyes: Negative.    Respiratory: Negative.    Cardiovascular: Negative.    Gastrointestinal: Negative for abdominal pain, constipation and diarrhea.   Endocrine: Negative.    Genitourinary: Negative for difficulty urinating, dyspareunia, genital sores, menstrual problem, pelvic pain, vaginal bleeding, vaginal discharge and vaginal pain.   Musculoskeletal: Negative.    Skin: Negative.    Neurological: Negative.    Psychiatric/Behavioral: Negative.        Ms. Palma  reports that she has never smoked. She has never used smokeless tobacco. She reports current alcohol use. She reports that she does not use drugs.      Current Outpatient Medications:   •  albuterol (PROVENTIL) (2.5 MG/3ML) 0.083% nebulizer solution, INHALE 1 VIAL AS DIRECTED EVERY SIX HOURS AS NEEDED, Disp: , Rfl: 0  •  cetirizine (zyrTEC) 10 MG tablet, Take 10 mg by mouth Daily., Disp: , Rfl:   •  Cholecalciferol 10 MCG (400 UNIT) capsule, once a day, Disp: , Rfl:   •  Estradiol-Norethindrone Acet (Amabelz) 0.5-0.1 MG per tablet, Take 1 tablet by mouth Daily for 90 days., Disp:  "90 tablet, Rfl: 2  •  metoprolol tartrate (LOPRESSOR) 25 MG tablet, Take 25 mg by mouth 2 (Two) Times a Day., Disp: , Rfl:   •  Multiple Vitamins-Minerals (MULTIVITAMIN WITH MINERALS) tablet tablet, Take 1 tablet by mouth Daily., Disp: , Rfl:   •  ondansetron ODT (ZOFRAN-ODT) 4 MG disintegrating tablet, TAKE 1 TABLET BY MOUTH EVERY EIGHT TO TWELVE HOURS AS NEEDED FOR NAUSEA AND VOMITING, Disp: , Rfl:   •  Probiotic Product (PROBIOTIC-10 PO), once a day, Disp: , Rfl:   •  sertraline (Zoloft) 25 MG tablet, Take 1 tablet by mouth Daily., Disp: 90 tablet, Rfl: 3  •  Vitamins-Lipotropics (MULTI-VITAMIN HP/MINERALS PO), once a day, Disp: , Rfl:       OBJECTIVE:  /78   Ht 172.7 cm (68\")   Wt 87.1 kg (192 lb)   Breastfeeding No   BMI 29.19 kg/m²    Physical Exam  Exam conducted with a chaperone present.   Constitutional:       Appearance: She is well-developed.   HENT:      Head: Normocephalic and atraumatic.   Eyes:      General: Lids are normal.      Conjunctiva/sclera: Conjunctivae normal.      Pupils: Pupils are equal, round, and reactive to light.   Neck:      Thyroid: No thyromegaly.   Cardiovascular:      Rate and Rhythm: Normal rate and regular rhythm.      Heart sounds: Normal heart sounds.   Pulmonary:      Effort: Pulmonary effort is normal.      Breath sounds: Normal breath sounds.   Chest:   Breasts: Breasts are symmetrical.      Right: No inverted nipple, mass, nipple discharge, skin change or tenderness.      Left: No inverted nipple, mass, nipple discharge, skin change or tenderness.       Abdominal:      General: Bowel sounds are normal.      Palpations: Abdomen is soft.   Genitourinary:     Exam position: Supine.      Labia:         Right: No rash, tenderness, lesion or injury.         Left: No rash, tenderness, lesion or injury.       Vagina: No signs of injury and foreign body. No vaginal discharge, erythema, tenderness or bleeding.      Cervix: No cervical motion tenderness, discharge or " friability.      Uterus: Not deviated, not enlarged, not fixed and not tender.       Adnexa:         Right: No mass, tenderness or fullness.          Left: No mass, tenderness or fullness.        Rectum: Normal. No tenderness or external hemorrhoid.   Musculoskeletal:         General: Normal range of motion.      Cervical back: Normal range of motion and neck supple.   Skin:     General: Skin is warm and dry.   Neurological:      Mental Status: She is alert and oriented to person, place, and time.         Assessment/Plan    Diagnoses and all orders for this visit:    1. Women's annual routine gynecological examination (Primary)  -     Liquid-based Pap Smear, Screening  Immunizations:      - Tetanus: Unknown or >10 years ago. Recommend to have at pharmacy or on injury.      - Influenza: recommended annually      - Pneumovax:once after age 65      - Prevnar: Once after age 65      - Zostavax: Once after age 60  Colon Cancer Screening: Due 2028  Mammogram: Due 2023  PAP: done today  DEXA: DEXA scan at 65  COVID vaccine information is available at vaccine.[place].gov     2. Menopause  -     Estradiol-Norethindrone Acet (Amabelz) 0.5-0.1 MG per tablet; Take 1 tablet by mouth Daily for 90 days.  Dispense: 90 tablet; Refill: 2  Stable on current therapy    3. Anxiety  -     sertraline (Zoloft) 25 MG tablet; Take 1 tablet by mouth Daily.  Dispense: 90 tablet; Refill: 3  Stable on current therapy    We will notify her when the Pap smear results return. She will schedule a mammogram.  She will followup in one year or sooner if needed.     An After Visit Summary was printed and given to the patient at discharge.  Return in about 1 year (around 6/2/2023) for Annual physical. Sooner if problems arise.          Tamela Umana APRN. 6/2/2022   Electronically Signed

## 2022-06-06 LAB
GEN CATEG CVX/VAG CYTO-IMP: ABNORMAL
HPV I/H RISK 4 DNA CVX QL PROBE+SIG AMP: NOT DETECTED
LAB AP CASE REPORT: ABNORMAL
LAB AP GYN ADDITIONAL INFORMATION: ABNORMAL
LAB AP GYN OTHER FINDINGS: ABNORMAL
Lab: ABNORMAL
PATH INTERP SPEC-IMP: ABNORMAL
STAT OF ADQ CVX/VAG CYTO-IMP: ABNORMAL

## 2022-06-17 DIAGNOSIS — F41.9 ANXIETY: ICD-10-CM

## 2022-06-17 DIAGNOSIS — Z78.0 MENOPAUSE: ICD-10-CM

## 2022-06-17 RX ORDER — SERTRALINE HYDROCHLORIDE 25 MG/1
TABLET, FILM COATED ORAL
Qty: 90 TABLET | Refills: 0 | OUTPATIENT
Start: 2022-06-17

## 2022-06-23 RX ORDER — ESTRADIOL AND NORETHINDRONE ACETATE .5; .1 MG/1; MG/1
TABLET ORAL
Qty: 28 TABLET | OUTPATIENT
Start: 2022-06-23

## 2022-07-18 DIAGNOSIS — F41.9 ANXIETY: ICD-10-CM

## 2022-07-19 RX ORDER — SERTRALINE HYDROCHLORIDE 25 MG/1
25 TABLET, FILM COATED ORAL DAILY
Qty: 90 TABLET | Refills: 3 | OUTPATIENT
Start: 2022-07-19

## 2023-02-21 DIAGNOSIS — Z78.0 MENOPAUSE: ICD-10-CM

## 2023-02-22 RX ORDER — ESTRADIOL AND NORETHINDRONE ACETATE .5; .1 MG/1; MG/1
TABLET ORAL
Qty: 84 TABLET | Refills: 2 | Status: SHIPPED | OUTPATIENT
Start: 2023-02-22

## 2023-06-13 ENCOUNTER — OFFICE VISIT (OUTPATIENT)
Dept: OBSTETRICS AND GYNECOLOGY | Facility: CLINIC | Age: 58
End: 2023-06-13
Payer: COMMERCIAL

## 2023-06-13 VITALS
WEIGHT: 196 LBS | HEIGHT: 68 IN | BODY MASS INDEX: 29.7 KG/M2 | SYSTOLIC BLOOD PRESSURE: 112 MMHG | DIASTOLIC BLOOD PRESSURE: 74 MMHG

## 2023-06-13 DIAGNOSIS — E66.3 OVERWEIGHT WITH BODY MASS INDEX (BMI) OF 29 TO 29.9 IN ADULT: ICD-10-CM

## 2023-06-13 DIAGNOSIS — Z79.890 HORMONE REPLACEMENT THERAPY (HRT): ICD-10-CM

## 2023-06-13 DIAGNOSIS — Z12.31 ENCOUNTER FOR SCREENING MAMMOGRAM FOR MALIGNANT NEOPLASM OF BREAST: ICD-10-CM

## 2023-06-13 DIAGNOSIS — Z01.419 WOMEN'S ANNUAL ROUTINE GYNECOLOGICAL EXAMINATION: Primary | ICD-10-CM

## 2023-06-13 PROCEDURE — 87624 HPV HI-RISK TYP POOLED RSLT: CPT | Performed by: NURSE PRACTITIONER

## 2023-06-13 PROCEDURE — G0123 SCREEN CERV/VAG THIN LAYER: HCPCS | Performed by: NURSE PRACTITIONER

## 2023-06-13 NOTE — PROGRESS NOTES
Chief Complaint   Patient presents with    Gynecologic Exam     Patient is here for annual well GYN exam.  Last well GYN exam and pap 22., pap ASCUS/-HPV.  Last mammo 22 BIRADS Cat 1 normal. Pt denies current pelvic pain, abnormal vaginal bleeding or discharge, menopausal symptoms, and voices no other complaints.         History:  Patricia Palma is a 58 y.o. female who presents today for evaluation of the above problems.      Patricia Palma is a 58 y.o. female here today for annual GYN examination. She is menopausal and has not had any recent abnormal Pap smears. She denies any vaginal discharge or bleeding. She is on hormone replacement therapy.  Her last mammogram was in 2022 and read as BIRADS 1. She has no specific complaints today and denies abdominal or pelvic pain.             ROS:  Review of Systems   Constitutional: Negative.    HENT: Negative.     Eyes: Negative.    Respiratory: Negative.     Cardiovascular: Negative.    Gastrointestinal: Negative.    Endocrine: Negative.    Genitourinary: Negative.    Musculoskeletal: Negative.    Skin: Negative.    Neurological: Negative.    Psychiatric/Behavioral: Negative.       Allergies   Allergen Reactions    Cefzil [Cefprozil] Itching     Itching, swelling, rash, facial swelling     Past Medical History:   Diagnosis Date    Abdominal discomfort     Diverticulosis     Shoulder pain, left      Past Surgical History:   Procedure Laterality Date    APPENDECTOMY       SECTION      x2    COLONOSCOPY      10 years    COLONOSCOPY N/A 3/8/2018    Procedure: COLONOSCOPY WITH ANESTHESIA;  Surgeon: Natalie Cruz MD;  Location: [facility] ENDOSCOPY;  Service:     D & C HYSTEROSCOPY N/A 2019    Procedure: DILATATION AND CURETTAGE HYSTEROSCOPY;  Surgeon: Kaley August MD;  Location: [facility] OR;  Service: Obstetrics/Gynecology    SHOULDER MANIPULATION Left 2018     Family History   Problem Relation Age of Onset    No Known Problems Father     No  "Known Problems Mother     No Known Problems Brother     No Known Problems Sister     No Known Problems Son     No Known Problems Daughter     Breast cancer Paternal Grandmother     No Known Problems Maternal Grandmother     No Known Problems Maternal Aunt     No Known Problems Paternal Aunt     No Known Problems Other     Colon cancer Neg Hx     Colon polyps Neg Hx     BRCA 1/2 Neg Hx     Endometrial cancer Neg Hx     Ovarian cancer Neg Hx     Uterine cancer Neg Hx     Melanoma Neg Hx       reports that she has never smoked. She has never used smokeless tobacco. She reports current alcohol use. She reports that she does not use drugs.      Current Outpatient Medications:     albuterol (PROVENTIL) (2.5 MG/3ML) 0.083% nebulizer solution, INHALE 1 VIAL AS DIRECTED EVERY SIX HOURS AS NEEDED, Disp: , Rfl: 0    Amabelz 0.5-0.1 MG per tablet, TAKE 1 TABLET DAILY, Disp: 84 tablet, Rfl: 2    cetirizine (zyrTEC) 10 MG tablet, Take 1 tablet by mouth Daily., Disp: , Rfl:     Cholecalciferol 10 MCG (400 UNIT) capsule, once a day, Disp: , Rfl:     metoprolol tartrate (LOPRESSOR) 25 MG tablet, Take 1 tablet by mouth 2 (Two) Times a Day., Disp: , Rfl:     Multiple Vitamins-Minerals (MULTIVITAMIN WITH MINERALS) tablet tablet, Take 1 tablet by mouth Daily., Disp: , Rfl:     ondansetron ODT (ZOFRAN-ODT) 4 MG disintegrating tablet, TAKE 1 TABLET BY MOUTH EVERY EIGHT TO TWELVE HOURS AS NEEDED FOR NAUSEA AND VOMITING, Disp: , Rfl:     Probiotic Product (PROBIOTIC-10 PO), once a day, Disp: , Rfl:     sertraline (Zoloft) 25 MG tablet, Take 1 tablet by mouth Daily., Disp: 90 tablet, Rfl: 3    Vitamins-Lipotropics (MULTI-VITAMIN HP/MINERALS PO), once a day, Disp: , Rfl:     OBJECTIVE:  /74   Ht 172.7 cm (68\")   Wt 88.9 kg (196 lb)   BMI 29.80 kg/m²    Physical Exam  Exam conducted with a chaperone present.   Constitutional:       Appearance: She is well-developed.   HENT:      Head: Normocephalic and atraumatic.   Eyes:      " General: Lids are normal.      Conjunctiva/sclera: Conjunctivae normal.      Pupils: Pupils are equal, round, and reactive to light.   Neck:      Thyroid: No thyromegaly.   Cardiovascular:      Rate and Rhythm: Normal rate and regular rhythm.      Heart sounds: Normal heart sounds.   Pulmonary:      Effort: Pulmonary effort is normal.      Breath sounds: Normal breath sounds.   Chest:   Breasts:     Breasts are symmetrical.      Right: No inverted nipple, mass, nipple discharge, skin change or tenderness.      Left: No inverted nipple, mass, nipple discharge, skin change or tenderness.   Abdominal:      General: Bowel sounds are normal.      Palpations: Abdomen is soft.   Genitourinary:     Exam position: Supine.      Labia:         Right: No rash, tenderness, lesion or injury.         Left: No rash, tenderness, lesion or injury.       Vagina: No signs of injury and foreign body. No vaginal discharge, erythema, tenderness or bleeding.      Cervix: No cervical motion tenderness, discharge or friability.      Uterus: Not deviated, not enlarged, not fixed and not tender.       Adnexa:         Right: No mass, tenderness or fullness.          Left: No mass, tenderness or fullness.        Rectum: Normal. No tenderness or external hemorrhoid.   Musculoskeletal:         General: Normal range of motion.      Cervical back: Normal range of motion and neck supple.   Skin:     General: Skin is warm and dry.   Neurological:      Mental Status: She is alert and oriented to person, place, and time.       Assessment/Plan    Diagnoses and all orders for this visit:    1. Women's annual routine gynecological examination (Primary)  -     Liquid-based Pap Smear, Screening  Immunizations:      - Tetanus: Unknown or >10 years ago. Recommend to have at pharmacy or on injury.      - Influenza: recommended annually      - Pneumovax:once after age 65      - Prevnar: Once after age 65      - Zostavax: Once after age 60  Colon Cancer  Screening: Due 2028  Mammogram: order placed   PAP: done today  DEXA: DEXA scan at 65  COVID vaccine information is available at vaccine.[place].gov     2. Encounter for screening mammogram for malignant neoplasm of breast  -     Mammo Screening Digital Tomosynthesis Bilateral With CAD; Future    3. Hormone replacement therapy (HRT)  Comments:  doing well on current therapy    4. Overweight with body mass index (BMI) of 29 to 29.9 in adult  Recommended attention to portion control and being careful about the types and timing of meals for the purpose of weight management.    We will notify her when the Pap smear results return. She will schedule a mammogram.  She will followup in one year or sooner if needed.    Problem List  Visit Diagnosis  Medications  SmartSets  Prep for Surgery  BestPractice :23}   An After Visit Summary was printed and given to the patient at discharge.  Return in about 1 year (around 6/13/2024) for Annual physical.          Tamela Umana APRN 6/13/2023   Electronically signed

## 2023-06-15 LAB
GEN CATEG CVX/VAG CYTO-IMP: NORMAL
HPV I/H RISK 4 DNA CVX QL PROBE+SIG AMP: NOT DETECTED
LAB AP CASE REPORT: NORMAL
LAB AP GYN ADDITIONAL INFORMATION: NORMAL
LAB AP GYN OTHER FINDINGS: NORMAL
Lab: NORMAL
PATH INTERP SPEC-IMP: NORMAL
STAT OF ADQ CVX/VAG CYTO-IMP: NORMAL

## 2023-09-06 ENCOUNTER — OFFICE VISIT (OUTPATIENT)
Dept: OBSTETRICS AND GYNECOLOGY | Facility: CLINIC | Age: 58
End: 2023-09-06
Payer: COMMERCIAL

## 2023-09-06 VITALS
HEIGHT: 68 IN | WEIGHT: 195 LBS | DIASTOLIC BLOOD PRESSURE: 78 MMHG | BODY MASS INDEX: 29.55 KG/M2 | SYSTOLIC BLOOD PRESSURE: 116 MMHG

## 2023-09-06 DIAGNOSIS — Z78.0 MENOPAUSE: ICD-10-CM

## 2023-09-06 DIAGNOSIS — N95.0 PMB (POSTMENOPAUSAL BLEEDING): Primary | ICD-10-CM

## 2023-09-06 DIAGNOSIS — F41.9 ANXIETY: ICD-10-CM

## 2023-09-06 PROCEDURE — 99213 OFFICE O/P EST LOW 20 MIN: CPT | Performed by: OBSTETRICS & GYNECOLOGY

## 2023-09-06 PROCEDURE — 88305 TISSUE EXAM BY PATHOLOGIST: CPT | Performed by: OBSTETRICS & GYNECOLOGY

## 2023-09-06 NOTE — PROGRESS NOTES
[facility]  Patricia Palma  : 1965  MRN: [id number]  CSN: [id number]    History and Physical    Subjective   Patricia Palma is a 58 y.o. year old  who presents for consultation about PMB.  Patient reports she was really sick (although supposedly negative for Covid when tested) several weeks ago.  Patient says she coughed so much and so hard that she was leaking urine (not usual for this patient).  She feels like she only noticed a scant amount of blood because she was paying attention to the bladder leakage - and because she had a previous D&C for PMB 4 years ago.  Patient has had no bleeding since that time.    Past Medical History:   Diagnosis Date    Abdominal discomfort     Diverticulosis     Shoulder pain, left      Past Surgical History:   Procedure Laterality Date    APPENDECTOMY       SECTION      x2    COLONOSCOPY      10 years    COLONOSCOPY N/A 3/8/2018    Procedure: COLONOSCOPY WITH ANESTHESIA;  Surgeon: Natalie Cruz MD;  Location: [facility] ENDOSCOPY;  Service:     D & C HYSTEROSCOPY N/A 2019    Procedure: DILATATION AND CURETTAGE HYSTEROSCOPY;  Surgeon: Kaley August MD;  Location: [facility] OR;  Service: Obstetrics/Gynecology    SHOULDER MANIPULATION Left 2018     Social History    Tobacco Use      Smoking status: Never      Smokeless tobacco: Never      Current Outpatient Medications:     albuterol (PROVENTIL) (2.5 MG/3ML) 0.083% nebulizer solution, INHALE 1 VIAL AS DIRECTED EVERY SIX HOURS AS NEEDED, Disp: , Rfl: 0    Amabelz 0.5-0.1 MG per tablet, TAKE 1 TABLET DAILY, Disp: 84 tablet, Rfl: 2    cetirizine (zyrTEC) 10 MG tablet, Take 1 tablet by mouth Daily., Disp: , Rfl:     Cholecalciferol 10 MCG (400 UNIT) capsule, once a day, Disp: , Rfl:     Multiple Vitamins-Minerals (MULTIVITAMIN WITH MINERALS) tablet tablet, Take 1 tablet by mouth Daily., Disp: , Rfl:     ondansetron ODT (ZOFRAN-ODT) 4 MG disintegrating tablet, TAKE 1 TABLET BY MOUTH EVERY EIGHT TO TWELVE  "HOURS AS NEEDED FOR NAUSEA AND VOMITING, Disp: , Rfl:     Probiotic Product (PROBIOTIC-10 PO), once a day, Disp: , Rfl:     sertraline (Zoloft) 25 MG tablet, Take 1 tablet by mouth Daily., Disp: 90 tablet, Rfl: 3    Vitamins-Lipotropics (MULTI-VITAMIN HP/MINERALS PO), once a day, Disp: , Rfl:     Allergies   Allergen Reactions    Cefzil [Cefprozil] Itching     Itching, swelling, rash, facial swelling       Family History   Problem Relation Age of Onset    No Known Problems Father     No Known Problems Mother     No Known Problems Brother     No Known Problems Sister     No Known Problems Son     No Known Problems Daughter     Breast cancer Paternal Grandmother     No Known Problems Maternal Grandmother     No Known Problems Maternal Aunt     No Known Problems Paternal Aunt     No Known Problems Other     Colon cancer Neg Hx     Colon polyps Neg Hx     BRCA 1/2 Neg Hx     Endometrial cancer Neg Hx     Ovarian cancer Neg Hx     Uterine cancer Neg Hx     Melanoma Neg Hx      Review of Systems   Constitutional:  Negative for activity change and unexpected weight change.   Respiratory:  Negative for shortness of breath.    Cardiovascular:  Negative for chest pain.   Gastrointestinal:  Negative for abdominal pain.   Genitourinary:  Positive for vaginal bleeding (scant amout a few weeks ago, but none since that time). Negative for pelvic pain.       Objective   /78   Ht 172.7 cm (68\")   Wt 88.5 kg (195 lb)   BMI 29.65 kg/m²     Physical Exam   Physical Exam  Vitals and nursing note reviewed.   Constitutional:       General: She is not in acute distress.     Appearance: She is well-developed.   HENT:      Head: Normocephalic and atraumatic.   Neck:      Thyroid: No thyromegaly.   Pulmonary:      Effort: Pulmonary effort is normal.   Genitourinary:     General: Normal vulva.      Comments: Pelvic u/s unremarkable, with 3.3 mm endometrial lining.    External  exam unremarkable.  Cervix normal in appearance.  Cervix " washed with Betadine and then grasped on the anterior lip with a single-tooth tenaculum.  Internal os gently dilated with a Fuad os finder.  Endometrial biopsy Pipelle easily passed into the uterine cavity and an adequate specimen obtained.  Tenaculum site oozing only slightly upon removal.  Patient tolerated procedure well, with moderate cramping that resolved quickly.  Musculoskeletal:         General: Normal range of motion.      Cervical back: Normal range of motion.   Skin:     General: Skin is warm and dry.   Neurological:      Mental Status: She is alert and oriented to person, place, and time.   Psychiatric:         Mood and Affect: Mood normal.         Behavior: Behavior normal.         Thought Content: Thought content normal.         Judgment: Judgment normal.       Labs  Lab Results   Component Value Date     09/23/2019    HGB 13.6 09/23/2019    HCT 39.4 09/23/2019    WBC 6.84 09/23/2019     07/03/2019    K 3.9 07/03/2019     07/03/2019    CO2 26.0 07/03/2019    BUN 12 07/03/2019    CREATININE 0.56 07/03/2019    GLUCOSE 102 (H) 07/03/2019    ALBUMIN 4.80 07/03/2019    CALCIUM 9.1 07/03/2019    AST 24 07/03/2019    ALT 21 07/03/2019    BILITOT 0.6 07/03/2019        Assessment & Plan    Diagnoses and all orders for this visit:    1. PMB (postmenopausal bleeding) (Primary): Patient reassured that with an endometrial lining of only 3.3 mm, she should be low risk for endometrial pathology.  Because of this, I felt an in office biopsy was sufficient and did not recommend hysteroscopy with D&C in the operating room.  The patient had an endometrial biopsy performed today in the office and tolerated that well.  Recommendations will be based on that path report  -     Tissue Pathology Exam    2. Anxiety: Patient reports mood doing well on Zoloft    3. Menopause         Kaley August MD  9/6/2023  14:11 CDT

## 2023-11-21 ENCOUNTER — TELEPHONE (OUTPATIENT)
Dept: OBSTETRICS AND GYNECOLOGY | Facility: CLINIC | Age: 58
End: 2023-11-21
Payer: COMMERCIAL

## 2023-11-21 NOTE — TELEPHONE ENCOUNTER
----- Message from Kaley August MD sent at 11/21/2023  3:11 PM CST -----  Regarding: Biopsy result follow up   Contact: [phone]  Talked to patient.  She is going to manage symptoms through the Holidays by going back on her HRT.  She does want to plan for a hysterectomy in February, so she no longer has to worry about the persistent PMB and continue to have endo biopsies to con  firm that it is benign.  Patient needs an appointment in January, so she can have surgery in February.  Thanks      ----- Message -----  From: Desiree Leach RN  Sent: 11/20/2023   3:28 PM CST  To: Kaley August MD  Subject: Biopsy result follow up                          Do you want me to advise this patient to schedule an appointment?       ----- Message -----  From: Patricia Palma  Sent: 11/20/2023   9:45 AM CST  To: Gia Velasquez [facility]  Subject: Biopsy result follow up                          Dr August,       I am grateful the biopsy result was good. Since my Sept 17 visit I have discontinued my HRT. The very small bleeding I did have stopped around that time. However,  I am REALLY struggling with the menopausal symptoms. Hot flashes are at least every hour and some during the night as well. The worst is that I CANNOT sleep. I just lay awake all night and finally sleep between around 3:30-6:00. I had the same problem before beginning the HRT years ago. I have moodiness, and brain fog, most likely from lack of sleep. My anxiety has definitely increased as well.  I have ALMOST just started taking the pills again but have been determined to tough it out. It seems to be worsening, not improving. I’ve been off them about 10 weeks.        The anxiety of having PM bleeding again and having to do another biopsy weighs on me. I don’t know if I can restart the pills and be ok. Do I need to have a hysterectomy to eliminate the anxiety of that? I know that wouldn’t be a pressing matter, but something to consider in the  future.   I need to get my life back to a functional state, especially with the holidays ahead.   I appreciate your thoughts and time on this matter. I am available for a phone call if that would be quicker for you in responding. [phone]. I hope you have a wonderful Thanksgiving.   Thank you,   Kiya Palma.

## 2024-01-31 DIAGNOSIS — Z78.0 MENOPAUSE: ICD-10-CM

## 2024-01-31 NOTE — TELEPHONE ENCOUNTER
Pt called stating she is out of her Amabelz. She has not received a 90 day supply from [facility] and she is requesting a 30 day supply be sent to her local pharmacy [facility].

## 2024-01-31 NOTE — TELEPHONE ENCOUNTER
Pt's next appt is 03/15/24 and last OV 09/06/23. 30 day supply of Amabelz to her requested pharmacy is pending if appropriate

## 2024-02-01 RX ORDER — ESTRADIOL AND NORETHINDRONE ACETATE .5; .1 MG/1; MG/1
1 TABLET ORAL DAILY
Qty: 28 TABLET | Refills: 1 | Status: SHIPPED | OUTPATIENT
Start: 2024-02-01

## 2024-03-15 ENCOUNTER — OFFICE VISIT (OUTPATIENT)
Dept: OBSTETRICS AND GYNECOLOGY | Age: 59
End: 2024-03-15
Payer: COMMERCIAL

## 2024-03-15 VITALS
SYSTOLIC BLOOD PRESSURE: 118 MMHG | DIASTOLIC BLOOD PRESSURE: 62 MMHG | BODY MASS INDEX: 30.31 KG/M2 | HEIGHT: 68 IN | RESPIRATION RATE: 18 BRPM | WEIGHT: 200 LBS

## 2024-03-15 DIAGNOSIS — H93.19 TINNITUS, UNSPECIFIED LATERALITY: ICD-10-CM

## 2024-03-15 DIAGNOSIS — N95.0 PMB (POSTMENOPAUSAL BLEEDING): Primary | ICD-10-CM

## 2024-03-15 DIAGNOSIS — F41.9 ANXIETY: ICD-10-CM

## 2024-03-15 DIAGNOSIS — Z78.0 MENOPAUSE: ICD-10-CM

## 2024-03-15 PROCEDURE — 99214 OFFICE O/P EST MOD 30 MIN: CPT | Performed by: OBSTETRICS & GYNECOLOGY

## 2024-03-15 RX ORDER — SODIUM CHLORIDE 0.9 % (FLUSH) 0.9 %
10 SYRINGE (ML) INJECTION AS NEEDED
OUTPATIENT
Start: 2024-03-15

## 2024-03-15 RX ORDER — SODIUM CHLORIDE 0.9 % (FLUSH) 0.9 %
10 SYRINGE (ML) INJECTION EVERY 12 HOURS SCHEDULED
OUTPATIENT
Start: 2024-03-15

## 2024-03-15 RX ORDER — ALBUTEROL SULFATE 90 UG/1
AEROSOL, METERED RESPIRATORY (INHALATION)
COMMUNITY
Start: 2023-12-26

## 2024-03-15 RX ORDER — SERTRALINE HYDROCHLORIDE 25 MG/1
25 TABLET, FILM COATED ORAL DAILY
Qty: 90 TABLET | Refills: 3 | Status: SHIPPED | OUTPATIENT
Start: 2024-03-15

## 2024-03-15 RX ORDER — ESTRADIOL AND NORETHINDRONE ACETATE .5; .1 MG/1; MG/1
1 TABLET ORAL DAILY
Qty: 90 TABLET | Refills: 3 | Status: SHIPPED | OUTPATIENT
Start: 2024-03-15

## 2024-03-15 RX ORDER — SCOLOPAMINE TRANSDERMAL SYSTEM 1 MG/1
1 PATCH, EXTENDED RELEASE TRANSDERMAL CONTINUOUS
OUTPATIENT
Start: 2024-03-15 | End: 2024-03-18

## 2024-03-15 RX ORDER — GABAPENTIN 100 MG/1
600 CAPSULE ORAL ONCE
OUTPATIENT
Start: 2024-03-15 | End: 2024-03-15

## 2024-03-15 RX ORDER — SODIUM CHLORIDE 9 MG/ML
40 INJECTION, SOLUTION INTRAVENOUS AS NEEDED
OUTPATIENT
Start: 2024-03-15

## 2024-03-15 RX ORDER — ACETAMINOPHEN 500 MG
1000 TABLET ORAL ONCE
OUTPATIENT
Start: 2024-03-15 | End: 2024-03-15

## 2024-03-15 RX ORDER — PHENAZOPYRIDINE HYDROCHLORIDE 100 MG/1
200 TABLET, FILM COATED ORAL ONCE
OUTPATIENT
Start: 2024-03-15 | End: 2024-03-15

## 2024-03-15 RX ORDER — METRONIDAZOLE 500 MG/100ML
500 INJECTION, SOLUTION INTRAVENOUS ONCE
OUTPATIENT
Start: 2024-03-15 | End: 2024-03-15

## 2024-03-15 NOTE — PROGRESS NOTES
[facility]  Patricia Palma  : 1965  MRN: [id number]  CSN: [id number]    History and Physical    Subjective   Patricia Palma is a 58 y.o. year old  who presents for consultation about surgery due to PMB.  Patient had bleeding in the fall, with a benign endometrial biopsy in 2023.  Patient was advised to stop her HRT and bleeding stopped, but she resumed her hormone replacement for the Holidays because she was just unable to tolerate the insomnia and vasomotor symptoms.  Since that time, she has only had one additional episode of vaginal bleeding, although she reports it was light.  Patient has now required endo biopsy one time and a D&C (back in ) one time, both due to PMB.  The patient does not feel like she can tolerate her menopausal symptoms without HRT; she has tried, and was miserable.  She is interested in having a hysterectomy so that she can continue hormone replacement therapy without uterine bleeding.    Past Medical History:   Diagnosis Date    Abdominal discomfort     Diverticulosis     Shoulder pain, left      Past Surgical History:   Procedure Laterality Date    APPENDECTOMY       SECTION      x2    COLONOSCOPY      10 years    COLONOSCOPY N/A 3/8/2018    Procedure: COLONOSCOPY WITH ANESTHESIA;  Surgeon: Natalie Cruz MD;  Location: [facility] ENDOSCOPY;  Service:     D & C HYSTEROSCOPY N/A 2019    Procedure: DILATATION AND CURETTAGE HYSTEROSCOPY;  Surgeon: Kaley August MD;  Location: [facility] OR;  Service: Obstetrics/Gynecology    SHOULDER MANIPULATION Left 2018     Social History    Tobacco Use      Smoking status: Never      Smokeless tobacco: Never      Current Outpatient Medications:     cetirizine (zyrTEC) 10 MG tablet, Take 1 tablet by mouth Daily., Disp: , Rfl:     Cholecalciferol 10 MCG (400 UNIT) capsule, once a day, Disp: , Rfl:     Estradiol-Norethindrone Acet (Amabelz) 0.5-0.1 MG per tablet, Take 1 tablet by mouth Daily., Disp: 28 tablet,  Rfl: 1    Multiple Vitamins-Minerals (MULTIVITAMIN WITH MINERALS) tablet tablet, Take 1 tablet by mouth Daily., Disp: , Rfl:     Omega-3 Fatty Acids (OMEGA 3 PO), Take  by mouth., Disp: , Rfl:     ondansetron ODT (ZOFRAN-ODT) 4 MG disintegrating tablet, TAKE 1 TABLET BY MOUTH EVERY EIGHT TO TWELVE HOURS AS NEEDED FOR NAUSEA AND VOMITING, Disp: , Rfl:     Probiotic Product (PROBIOTIC-10 PO), once a day, Disp: , Rfl:     sertraline (Zoloft) 25 MG tablet, Take 1 tablet by mouth Daily., Disp: 90 tablet, Rfl: 3    albuterol (PROVENTIL) (2.5 MG/3ML) 0.083% nebulizer solution, INHALE 1 VIAL AS DIRECTED EVERY SIX HOURS AS NEEDED (Patient not taking: Reported on 3/15/2024), Disp: , Rfl: 0    albuterol sulfate  (90 Base) MCG/ACT inhaler, 2 PUFFS 4 TIMES A DAY AS NEEDED (Patient not taking: Reported on 3/15/2024), Disp: , Rfl:     Allergies   Allergen Reactions    Cefzil [Cefprozil] Itching     Itching, swelling, rash, facial swelling       Family History   Problem Relation Age of Onset    No Known Problems Father     No Known Problems Mother     No Known Problems Brother     No Known Problems Sister     No Known Problems Son     No Known Problems Daughter     Breast cancer Paternal Grandmother     No Known Problems Maternal Grandmother     No Known Problems Maternal Aunt     No Known Problems Paternal Aunt     No Known Problems Other     Colon cancer Neg Hx     Colon polyps Neg Hx     BRCA 1/2 Neg Hx     Endometrial cancer Neg Hx     Ovarian cancer Neg Hx     Uterine cancer Neg Hx     Melanoma Neg Hx      Review of Systems   Constitutional:  Negative for activity change and unexpected weight change.   Eyes:  Positive for visual disturbance (wears glasses).   Respiratory:  Negative for shortness of breath.    Cardiovascular:  Negative for chest pain.   Gastrointestinal:  Negative for abdominal pain.   Genitourinary:  Positive for vaginal bleeding (several months ago, but once since that time). Negative for pelvic pain.  "        Objective   /62   Resp 18   Ht 172.7 cm (68\")   Wt 90.7 kg (200 lb)   BMI 30.41 kg/m²     Physical Exam   Physical Exam  Vitals and nursing note reviewed.   Constitutional:       General: She is not in acute distress.     Appearance: She is well-developed.   HENT:      Head: Normocephalic and atraumatic.   Neck:      Thyroid: No thyromegaly.   Pulmonary:      Effort: Pulmonary effort is normal.   Abdominal:      General: There is no distension.      Palpations: Abdomen is soft.      Tenderness: There is no abdominal tenderness.   Musculoskeletal:         General: Normal range of motion.      Cervical back: Normal range of motion.   Skin:     General: Skin is warm and dry.   Neurological:      Mental Status: She is alert and oriented to person, place, and time.   Psychiatric:         Behavior: Behavior normal.         Judgment: Judgment normal.       Labs  Lab Results   Component Value Date     09/23/2019    HGB 13.6 09/23/2019    HCT 39.4 09/23/2019    WBC 6.84 09/23/2019     07/03/2019    K 3.9 07/03/2019     07/03/2019    CO2 26.0 07/03/2019    BUN 12 07/03/2019    CREATININE 0.56 07/03/2019    GLUCOSE 102 (H) 07/03/2019    ALBUMIN 4.80 07/03/2019    CALCIUM 9.1 07/03/2019    AST 24 07/03/2019    ALT 21 07/03/2019    BILITOT 0.6 07/03/2019     Most-recent pelvic u/s:  Impression:     1.  Uterus: Normal size and Anteverted     2.  Endometrium:  3.3 mm      3.  Myometrium: Normal homogenous texture  and Single fibroid 2 x 2 cm      4.  Ovaries  Left:    Not visualized   Right:  Normal/unremarkable         Relevant comparison data: No relevant comparison data     Maximiliano Vidales MD  9/6/2023 14:25 CDT     Assessment & Plan    Diagnoses and all orders for this visit:    1. PMB (postmenopausal bleeding) (Primary): Patient with persistent postmenopausal bleeding, she stopped taking her hormone replacement therapy, which she finds intolerable.  The patient would like to have a " hysterectomy so that she can conveniently and safely continue to take her hormone replacement.  Risks of laparoscopic surgery were reviewed to include, but are not limited to, the possibility of bowel perforation requiring possible bowel resection and/or colostomy, possible bladder injury or possible and possible vascular injury which could require the need for a blood transfusion.  Possible need for conversion to a laparotomy was also discussed.  The patient's questions were answered to her satisfaction.  Post-op restrictions and typical post-op course were also reviewed.  After discussing pros and cons of ovarian conservation versus removal, patient would also like to have her fallopian tubes and ovaries removed at the same time as her hysterectomy.  -     Case Request; Standing  -     CBC and Differential; Future  -     ECG 12 Lead; Future  -     sodium chloride 0.9 % flush 10 mL  -     sodium chloride 0.9 % flush 10 mL  -     sodium chloride 0.9 % infusion 40 mL  -     phenazopyridine (PYRIDIUM) tablet 200 mg  -     metroNIDAZOLE (FLAGYL) IVPB 500 mg  -     levoFLOXacin (LEVAQUIN) 500 mg in sodium chloride 0.9 % 150 mL IVPB  -     acetaminophen (TYLENOL) tablet 1,000 mg  -     gabapentin (NEURONTIN) capsule 600 mg  -     scopolamine patch 1 mg/72 hr  -     Case Request    2. Tinnitus, unspecified laterality  -     Ambulatory Referral to ENT (Otolaryngology)    3. Menopause  -     Estradiol-Norethindrone Acet (Amabelz) 0.5-0.1 MG per tablet; Take 1 tablet by mouth Daily.  Dispense: 90 tablet; Refill: 3    4. Anxiety  -     sertraline (Zoloft) 25 MG tablet; Take 1 tablet by mouth Daily.  Dispense: 90 tablet; Refill: 3    Other orders  -     Code Status and Medical Interventions:; Standing  -     Follow Anesthesia Guidelines / Protocol; Future  -     Follow Anesthesia Guidelines / Protocol; Standing  -     Chlorhexidine Skin Prep; Future  -     Verify / Perform Chlorhexidine Skin Prep; Standing  -     Type &  Screen; Standing  -     Insert Peripheral IV; Standing  -     Saline Lock & Maintain IV Access; Standing  -     Obtain Informed Consent; Standing  -     Verify NPO Status; Standing  -     Place Sequential Compression Device; Standing         Kaley August MD  3/15/2024  11:59 CDT

## 2024-03-23 DIAGNOSIS — Z78.0 MENOPAUSE: ICD-10-CM

## 2024-03-26 RX ORDER — ESTRADIOL AND NORETHINDRONE ACETATE .5; .1 MG/1; MG/1
1 TABLET ORAL DAILY
Qty: 28 TABLET | Refills: 1 | Status: SHIPPED | OUTPATIENT
Start: 2024-03-26

## 2024-04-04 ENCOUNTER — TELEPHONE (OUTPATIENT)
Dept: OBSTETRICS AND GYNECOLOGY | Age: 59
End: 2024-04-04

## 2024-04-04 NOTE — TELEPHONE ENCOUNTER
Caller: Patricia Palma     Relationship: SELF    Best call back number: [phone]    What is your medical concern? PT IS HAVING A LAPAROSCOPIC PROCEDURE ON 4/15/24 AND WANTED TO MAKE SURE DR. 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 PT HAS BEEN AWARE OF THIS SINCE MRI OF BACK SEVERAL YEARS AGO - JUST NOW THOUGHT TO BRING IT UP WITH PROCEDURE COMING UP    Is your provider already aware of this issue? NOT SURE    Have you been treated for this issue? NO - JUST WANTING TO KNOW IF THIS WILL DELAY LAP. PROCEDURE OR CHANGE PROCEDURE TO A FULL SURGERY - IF SO PLEASE LET PT KNOW AS SOON AS POSSIBLE SO OTHER PLANS CAN BE MADE.

## 2024-04-04 NOTE — TELEPHONE ENCOUNTER
"I understand the patient's concerns.  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  Incidentally, patients with this anomaly also tend to have ureters that are slightly out of place compared to \"normal\" anatomy - this does mean she is at a slightly higher risk of ureteral injury during surgery, although this is always a concern with hysterectomy, and the reason that a cystoscopy is performed at the end of every case.  If she has concerns, I am happy to refer her to someone in [place] to have her surgery completed, but I do not think that it will increase her surgical risk very much.  Please call patient and see what she wants to do."

## 2024-04-04 NOTE — TELEPHONE ENCOUNTER
After speaking with pt she is moving her surgery to November. Pt following up with us in June for annual exam and is scheduled for another pre op visit in October.

## 2024-06-04 ENCOUNTER — OFFICE VISIT (OUTPATIENT)
Dept: OTOLARYNGOLOGY | Facility: CLINIC | Age: 59
End: 2024-06-04
Payer: COMMERCIAL

## 2024-06-04 VITALS
HEART RATE: 75 BPM | DIASTOLIC BLOOD PRESSURE: 79 MMHG | TEMPERATURE: 97.5 F | SYSTOLIC BLOOD PRESSURE: 123 MMHG | BODY MASS INDEX: 30.48 KG/M2 | WEIGHT: 201.13 LBS | HEIGHT: 68 IN

## 2024-06-04 DIAGNOSIS — H91.93 DECREASED HEARING OF BOTH EARS: ICD-10-CM

## 2024-06-04 DIAGNOSIS — H93.13 TINNITUS OF BOTH EARS: Primary | ICD-10-CM

## 2024-06-04 PROCEDURE — 99213 OFFICE O/P EST LOW 20 MIN: CPT | Performed by: NURSE PRACTITIONER

## 2024-06-04 NOTE — PROGRESS NOTES
SHIV Waller  [facility]  [address], [address]  [facility] [address]  Fax [phone]  Phone [phone]      Visit Type: NEW PATIENT   Chief Complaint   Patient presents with    Tinnitus           Tinnitus      Patricia Palma is a 59 y.o. female who presents for evaluation of tinnitus. Has been present for years. Localized to ears bilaterally. Described as high pitched buzzing, cicada sound. She reports seems to be worsening. Does admit notable hearing loss bilaterally, has never had hearing test. Believes she ruptured one of her ear drums as a child but denies other ear history. Denies ear pain, pressure, fullness, drainage or other notable ear complaints.     Quite or inactivity seems to worsen  Masking helps        Past Medical History:   Diagnosis Date    Abdominal discomfort     Diverticulosis     Shoulder pain, left        Past Surgical History:   Procedure Laterality Date    APPENDECTOMY       SECTION      x2    COLONOSCOPY      10 years    COLONOSCOPY N/A 3/8/2018    Procedure: COLONOSCOPY WITH ANESTHESIA;  Surgeon: Natalie Cruz MD;  Location: [facility] ENDOSCOPY;  Service:     D & C HYSTEROSCOPY N/A 2019    Procedure: DILATATION AND CURETTAGE HYSTEROSCOPY;  Surgeon: Kaley August MD;  Location: [facility] OR;  Service: Obstetrics/Gynecology    SHOULDER MANIPULATION Left 2018       Family History: Her family history includes Breast cancer in her paternal grandmother; No Known Problems in her brother, daughter, father, maternal aunt, maternal grandmother, mother, paternal aunt, sister, son, and another family member.     Social History: She  reports that she has never smoked. She has never used smokeless tobacco. She reports current alcohol use. She reports that she does not use drugs.    Home Medications:  Cholecalciferol, Estradiol-Norethindrone Acet, Omega-3 Fatty Acids, Probiotic Product,  albuterol, albuterol sulfate HFA, cetirizine, multivitamin with minerals, ondansetron ODT, and sertraline    Allergies:  She is allergic to cefzil [cefprozil].       Vital Signs:   Temp:  [97.5 °F (36.4 °C)] 97.5 °F (36.4 °C)  Heart Rate:  [75] 75  BP: (123)/(79) 123/79  ENT Physical Exam  Constitutional  Appearance: patient appears well-developed, well-nourished and well-groomed,  Communication/Voice: communication appropriate for developmental age; vocal quality normal;  Head and Face  Appearance: head appears normal, face appears normal and face appears atraumatic;  Palpation: facial palpation normal;  Ear  Hearing: intact;  Auricles: bilateral auricles normal;  External Mastoids: bilateral external mastoids normal;  Ear Canals: bilateral ear canals normal;  Tympanic Membranes: bilateral tympanic membranes normal;  Nose  External Nose: nares patent bilaterally; external nose normal;  Internal Nose: nasal mucosa normal; nasal septal deviation present; deviation is to the left, septal deviation is mild; bilateral inferior turbinates erythematous;  Oral Cavity/Oropharynx  Lips: normal;  Teeth: normal;  Gums: gingiva normal;  Tongue: normal;  Oral mucosa: normal;  Hard palate: normal;  Soft palate: normal;  Tonsils: normal;  Base of Tongue: normal;  Posterior pharyngeal wall: normal;           Result Review       RESULTS REVIEW    I have reviewed the patients old records in the chart.           Assessment & Plan  Tinnitus of both ears    Decreased hearing of both ears       Orders Placed This Encounter   Procedures    Comprehensive Hearing Test           Treatment options discussed including conservative measures, medication or more advanced tinnitus recommendations. She wishes for conservative measures for now. She is agreeable to audio at follow up.     Medical and surgical options were discussed including observation, continued medical management, medication modification, surgical management, and home masking.  Risks, benefits and alternatives were discussed and questions were answered. After considering the options, the patient decided to proceed with home masking.  Call for ear problems, especially change of hearing, ear pain or dizziness.  Protect getting water in the ears. If needed, may use over the counter silicone plugs or a cotton ball coated with vasoline when bathing.  Use hairdryer on a cool setting after bathing.  Use hearing protection in loud noise situations.  Use home masking techniques- use low sound level of a pleasant sound like a fan or radio at night to drown out the tinnitus sound. If not working, can consider formal masking device through our hearing aid department.  Home tinnitus recommendation  Need audio, will obtain audio at follow up    Call with questions or concerns    Return in about 8 weeks (around 7/30/2024) for Recheck with audio.        Electronically signed by SHIV Waller 06/04/24 11:24 AM CDT.

## 2024-06-14 ENCOUNTER — OFFICE VISIT (OUTPATIENT)
Dept: OBSTETRICS AND GYNECOLOGY | Age: 59
End: 2024-06-14
Payer: COMMERCIAL

## 2024-06-14 VITALS
DIASTOLIC BLOOD PRESSURE: 78 MMHG | WEIGHT: 200 LBS | HEIGHT: 68 IN | SYSTOLIC BLOOD PRESSURE: 114 MMHG | BODY MASS INDEX: 30.31 KG/M2

## 2024-06-14 DIAGNOSIS — Z78.0 MENOPAUSE: ICD-10-CM

## 2024-06-14 DIAGNOSIS — N39.3 SUI (STRESS URINARY INCONTINENCE, FEMALE): ICD-10-CM

## 2024-06-14 DIAGNOSIS — F41.9 ANXIETY: ICD-10-CM

## 2024-06-14 DIAGNOSIS — Z12.31 BREAST CANCER SCREENING BY MAMMOGRAM: ICD-10-CM

## 2024-06-14 DIAGNOSIS — Z01.419 ENCOUNTER FOR GYNECOLOGICAL EXAMINATION WITHOUT ABNORMAL FINDING: Primary | ICD-10-CM

## 2024-06-14 RX ORDER — SERTRALINE HYDROCHLORIDE 25 MG/1
25 TABLET, FILM COATED ORAL DAILY
Qty: 90 TABLET | Refills: 3 | Status: SHIPPED | OUTPATIENT
Start: 2024-06-14

## 2024-06-14 RX ORDER — ESTRADIOL AND NORETHINDRONE ACETATE .5; .1 MG/1; MG/1
1 TABLET ORAL DAILY
Qty: 90 TABLET | Refills: 3 | Status: SHIPPED | OUTPATIENT
Start: 2024-06-14

## 2024-06-14 NOTE — PROGRESS NOTES
"Subjective   Chief Complaint   Patient presents with    Annual Exam     Pt here today for annual exam. Pt last pap 2-23 normal. Pt last mammo 2023 normal. Last colonoscopy normal-next due . Pt voices no concerns.      Patricia Palma is a 59 y.o. year old  menopausal female presenting to be seen for her annual exam.  Overall, the patient reports to be feeling \"well\".  The patient denies any vaginal bleeding since last .  We did a biopsy in 2023 which was benign. Hot flashes and night sweats are not a significant problem, as long as she is taking her HRT.  The patient initially came off her her HRT for the benign bleeding, but could not tolerate the vasomotor symptoms.  She has been back on her combination HRT since last December and done well.      Endometrial biopsy last :  Clinical Information    Endometrial bx   Final Diagnosis   Endometrium, biopsy:  Glandular and stromal breakdown.   Electronically signed by Geremias Lemons MD on 2023 at 1239   Gross Description    1. Endometrium.   Received in a formalin filled container labeled with the patient's name, date of birth, and \"endometrial biopsy\".  The specimen consists of multiple fragments of red-tan, hemorrhagic tissue admixed with mucoid material.  The fragments aggregate to 0.6 x 0.3 x 0.1 cm.  The specimen is filtered through a mesh bag and totally submitted in block 1A.       SEXUAL Hx:  She is sexually active.  In the past year there has not been new sexual partners.  She does admit that intercourse is starting to be a little \"irritating\", but not bad.    HEALTH Hx:  She exercises regularly: yes.  The patient reports regular self breast exams: yes  She has noticed changes in height: no.    No Additional Complaints Reported    The following portions of the patient's history were reviewed and updated as appropriate:problem list, current medications, allergies, past family history, past medical history, past social " "history, and past surgical history.    Social History    Tobacco Use      Smoking status: Never      Smokeless tobacco: Never    Review of Systems   Constitutional:  Negative for activity change and unexpected weight change.   HENT:  Negative for congestion.    Eyes:  Positive for visual disturbance (wears glasses).   Respiratory:  Negative for shortness of breath.    Cardiovascular:  Negative for chest pain.   Gastrointestinal:  Negative for abdominal pain, blood in stool, constipation and diarrhea.        Colonoscopy not due again until 2028   Endocrine: Negative for cold intolerance and heat intolerance.   Genitourinary:  Positive for enuresis (only rare LNIDSAY). Negative for difficulty urinating, dyspareunia, pelvic pain, vaginal discharge and vaginal pain.   Musculoskeletal:  Positive for arthralgias (mild, in multiple joints). Negative for back pain, neck pain and neck stiffness.   Skin:  Negative for rash.   Neurological:  Negative for dizziness and headaches.   Psychiatric/Behavioral:  Negative for sleep disturbance. The patient is not nervous/anxious.         Zoloft working well       Objective   /78   Ht 172.7 cm (68\")   Wt 90.7 kg (200 lb)   BMI 30.41 kg/m²   Physical Exam  Vitals and nursing note reviewed. Exam conducted with a chaperone present.   Constitutional:       General: She is not in acute distress.     Appearance: Normal appearance. She is well-developed and normal weight.   HENT:      Head: Normocephalic and atraumatic.   Cardiovascular:      Rate and Rhythm: Normal rate and regular rhythm.      Heart sounds: No murmur heard.  Pulmonary:      Effort: Pulmonary effort is normal.      Breath sounds: Normal breath sounds.   Chest:   Breasts:     Right: No inverted nipple or mass.      Left: No inverted nipple or mass.   Abdominal:      General: There is no distension.      Palpations: Abdomen is soft.      Tenderness: There is no abdominal tenderness.   Genitourinary:     General: Normal " vulva.      Exam position: Lithotomy position.      Labia:         Right: No tenderness or lesion.         Left: No tenderness or lesion.       Vagina: Normal. No vaginal discharge, tenderness or bleeding.      Cervix: No cervical motion tenderness, discharge or friability.      Adnexa:         Right: No tenderness or fullness.          Left: No tenderness or fullness.        Rectum: Normal.   Musculoskeletal:         General: Normal range of motion.      Cervical back: Normal range of motion and neck supple.   Skin:     General: Skin is warm and dry.   Neurological:      Mental Status: She is alert and oriented to person, place, and time.   Psychiatric:         Mood and Affect: Mood normal.         Behavior: Behavior normal.         Thought Content: Thought content normal.         Judgment: Judgment normal.              Assessment & Plan    Diagnoses and all orders for this visit:    1. Encounter for gynecological examination without abnormal finding (Primary): Exam unremarkable.  Patient reports regular self breast exams; mammogram ordered.  Routine screening labs with PCP.  DEXA was normal in 2020 and not due to be repeated until next year.    2. Breast cancer screening by mammogram  -     Mammo Screening Digital Tomosynthesis Bilateral With CAD; Future    3. Menopause: Patient pleased with current HRT.  She did try to come off of it last year due to some postmenopausal bleeding.  Her endometrial biopsy was benign.  She could not tolerate the vasomotor symptoms without her hormone replacement, and has not had any bleeding since resuming HRT.  Refills of HRT have been sent to the pharmacy  -     Estradiol-Norethindrone Acet 0.5-0.1 MG per tablet; Take 1 tablet by mouth Daily.  Dispense: 90 tablet; Refill: 3    4. Anxiety: Well-controlled.  Refill sent to pharmacy  -     sertraline (Zoloft) 25 MG tablet; Take 1 tablet by mouth Daily.  Dispense: 90 tablet; Refill: 3    5. LINDSAY (stress urinary incontinence,  female)  Comments:  very rare, patient not bothered enough for intervention         This note was electronically signed.    Kaley August MD  6/14/2024  10:05 CDT

## 2024-08-06 ENCOUNTER — PROCEDURE VISIT (OUTPATIENT)
Dept: OTOLARYNGOLOGY | Facility: CLINIC | Age: 59
End: 2024-08-06
Payer: COMMERCIAL

## 2024-08-06 ENCOUNTER — OFFICE VISIT (OUTPATIENT)
Dept: OTOLARYNGOLOGY | Facility: CLINIC | Age: 59
End: 2024-08-06
Payer: COMMERCIAL

## 2024-08-06 VITALS
HEART RATE: 68 BPM | HEIGHT: 68 IN | BODY MASS INDEX: 30.83 KG/M2 | WEIGHT: 203.4 LBS | TEMPERATURE: 98.4 F | DIASTOLIC BLOOD PRESSURE: 72 MMHG | SYSTOLIC BLOOD PRESSURE: 106 MMHG

## 2024-08-06 DIAGNOSIS — H93.13 TINNITUS OF BOTH EARS: Primary | ICD-10-CM

## 2024-08-06 DIAGNOSIS — H91.93 DECREASED HEARING OF BOTH EARS: ICD-10-CM

## 2024-08-06 DIAGNOSIS — H93.13 TINNITUS, BILATERAL: Primary | ICD-10-CM

## 2024-08-06 DIAGNOSIS — H90.3 SENSORINEURAL HEARING LOSS, BILATERAL: ICD-10-CM

## 2024-08-06 DIAGNOSIS — H90.3 SENSORINEURAL HEARING LOSS (SNHL) OF BOTH EARS: ICD-10-CM

## 2024-08-06 PROCEDURE — 92567 TYMPANOMETRY: CPT

## 2024-08-06 PROCEDURE — 99213 OFFICE O/P EST LOW 20 MIN: CPT | Performed by: NURSE PRACTITIONER

## 2024-08-06 PROCEDURE — 92557 COMPREHENSIVE HEARING TEST: CPT

## 2024-08-06 NOTE — PROGRESS NOTES
AUDIOMETRIC EVALUATION      Name:  Patricia Palma  :  1965  Age:  59 y.o.  Date of Evaluation:  2024       History:  Ms. Palma is seen today for a hearing evaluation due to tinnitus concerns at the request of SHIV Barksdale.    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   Family history of hearing loss: No  Head trauma requiring hospital stay: No  Chemotherapy: No  Other significant history: Sleep apnea and sleeps with a CPAP    **Case history obtained in office and through EMR system      EVALUATION:        RESULTS:    Otoscopic Evaluation:  Right: clear canal, tympanic membrane visualized  Left: clear canal, tympanic membrane visualized    Tympanometry (226 Hz):  Right: Type A  Left: Type A      Pure Tone Audiometry:    Right: Grossly normal slopes to mild after 4kHz sensorineural hearing loss   Left: Grossly normal slopes to moderate after 4kHz sensorineural hearing loss     Speech Audiometry:   Right: Speech Reception Threshold (SRT) was obtained at 10 dB HL  Word Recognition scores - Excellent (100)% at 50 dB, using NU-6 List 1A with 10 words  Left: Speech Reception Threshold (SRT) was obtained at 10 dB HL  Word Recognition scores - Excellent (100)% at 50 dB, using NU-6 List 2A with 10 words  SRT/PTA in good agreement bilaterally.    IMPRESSIONS:  Tympanometry showed normal middle ear pressure and static compliance, consistent with normal middle ear function for both ears.    Pure tone thresholds for the right ear show mild high frequency sensorineural hearing loss, suggesting normal outer/middle ear function and abnormal cochlear/retrocochlear function.     Pure tone thresholds for the left ear show moderate high frequency sensorineural hearing loss, suggesting normal  outer/middle ear function and abnormal cochlear/retrocochlear function. Patient was counseled with regard to the findings.        Diagnosis:  1. Tinnitus, bilateral    2. Sensorineural hearing loss, bilateral         RECOMMENDATIONS/PLAN:  Follow-up recommendations per SHIV Barksdale.  Practice good communication strategies to assist with everyday listening (eye contact with speakers, reduce background noise, encourage others to communicate clearly and slowly).  Tinnitus management strategies. Consider use of amplification, a white noise machine, low level TV/radio, or fan at night to help mask the tinnitus. It is also recommended to avoid caffeine, alcohol, tobacco, salt, high dose NSAIDs, and to increase hydration. Additional resources: Resound Relief rick and American Tinnitus Association (www.hilario.org).  Repeat hearing evaluation if changes in hearing are noted or concerns arise.  Discussed results and recommendations with patient. Questions were addressed and the patient was encouraged to contact our department should concerns arise.        Arielle Delaney, CCC-A  Doctor of Audiology

## 2024-08-06 NOTE — PROGRESS NOTES
SHIV Waller  W ENT [facility] EAR NOSE & THROAT  [address], [address]  [place] [address]  Fax [phone]  Phone [phone]      Visit Type: FOLLOW UP   Chief Complaint   Patient presents with    Recheck tinnitus           HPI  Patricia Palma is a 59 y.o. female who presents for follow-up evaluation, recheck on tinnitus and to obtain audio. Has been present for years. Localized to ears bilaterally. Described as high pitched buzzing, cicada sound. She reports seems to be worsening. Does admit notable hearing loss bilaterally, has never had hearing test. Believes she ruptured one of her ear drums as a child but denies other ear history. Denies ear pain, pressure, fullness, drainage or other notable ear complaints.   Reports quiet or inactivity seems to worsen tinnitus, masking helps.    Since last visit she reports she has had not change in symptoms. Denies new symptoms or complaints.     Past Medical History:   Diagnosis Date    Abdominal discomfort     Diverticulosis     Horseshoe kidney     Shoulder pain, left        Past Surgical History:   Procedure Laterality Date    APPENDECTOMY       SECTION      x2    COLONOSCOPY      10 years    COLONOSCOPY N/A 3/8/2018    Procedure: COLONOSCOPY WITH ANESTHESIA;  Surgeon: Natalie Cruz MD;  Location: [facility] ENDOSCOPY;  Service:     D & C HYSTEROSCOPY N/A 2019    Procedure: DILATATION AND CURETTAGE HYSTEROSCOPY;  Surgeon: Kaley August MD;  Location: [facility] OR;  Service: Obstetrics/Gynecology    SHOULDER MANIPULATION Left 2018       Family History: Her family history includes Breast cancer in her paternal grandmother; No Known Problems in her brother, daughter, father, maternal aunt, maternal grandmother, mother, paternal aunt, sister, son, and another family member.     Social History: She  reports that she has never smoked. She has never used smokeless tobacco. She reports  current alcohol use. She reports that she does not use drugs.    Home Medications:  Cholecalciferol, Estradiol-Norethindrone Acet, Omega-3 Fatty Acids, Probiotic Product, albuterol, albuterol sulfate HFA, cetirizine, multivitamin with minerals, ondansetron ODT, and sertraline    Allergies:  She is allergic to cefzil [cefprozil].       Vital Signs:   Temp:  [98.4 °F (36.9 °C)] 98.4 °F (36.9 °C)  Heart Rate:  [68] 68  BP: (106)/(72) 106/72  ENT Physical Exam  Constitutional  Appearance: patient appears well-developed, well-nourished and well-groomed,  Communication/Voice: communication appropriate for developmental age; vocal quality normal;  Head and Face  Appearance: head appears normal, face appears normal and face appears atraumatic;  Palpation: facial palpation normal;  Ear  Hearing: intact;  Auricles: bilateral auricles normal;  External Mastoids: bilateral external mastoids normal;  Ear Canals: bilateral ear canals normal;  Tympanic Membranes: bilateral tympanic membranes normal;  Nose  External Nose: nares patent bilaterally; external nose normal;  Internal Nose: nasal mucosa normal; nasal septal deviation present; deviation is to the left, septal deviation is mild; bilateral inferior turbinates erythematous;  Oral Cavity/Oropharynx  Lips: normal;  Teeth: normal;  Gums: gingiva normal;  Tongue: normal;  Oral mucosa: normal;  Hard palate: normal;  Soft palate: normal;  Tonsils: normal;  Base of Tongue: normal;  Posterior pharyngeal wall: normal;  Neck  Neck: neck normal; neck palpation normal;  Respiratory  Inspection: breathing unlabored;  Cardiovascular  Inspection: extremities are warm and well perfused;  Neurovestibular  Mental Status: alert and oriented;           Result Review       RESULTS REVIEW    I have reviewed the patients old records in the chart.   The following results/records were reviewed:     Procedure visit with Arielle Gonzalez Au.D (08/06/2024)          Assessment & Plan  Tinnitus of both  ears    Decreased hearing of both ears    Sensorineural hearing loss (SNHL) of both ears                Audio reviewed in office with patient, reveals some mild to moderate upper frequency sensorineural hearing loss otherwise grossly normal hearing test.  Treatment options discussed including continued conservative measures, medication, tinnitus maskers/retrainers.  She reports overall her symptoms are tolerable and wishes to continue conservative measures.  She will continue home tinnitus recommendations.  Advised routine yearly monitoring.  She will call if symptoms worsen or if further intervention is desired sooner than recheck.    Call for ear problems, especially change of hearing, ear pain or dizziness.  Protect getting water in the ears. If needed, may use over the counter silicone plugs or a cotton ball coated with vasoline when bathing.  Use hairdryer on a cool setting after bathing.  Use hearing protection in loud noise situations.  Use home masking techniques- use low sound level of a pleasant sound like a fan or radio at night to drown out the tinnitus sound. If not working, can consider formal masking device through our hearing aid department.  Home tinnitus recommendations      Call with questions or concerns    Return in about 1 year (around 8/6/2025) for Recheck tinnitus.        Electronically signed by SHIV Waller 08/06/24 9:28 AM CDT.

## 2024-09-12 ENCOUNTER — HOSPITAL ENCOUNTER (OUTPATIENT)
Dept: MAMMOGRAPHY | Facility: HOSPITAL | Age: 59
Discharge: HOME | End: 2024-09-12
Admitting: OBSTETRICS & GYNECOLOGY
Payer: COMMERCIAL

## 2024-09-12 DIAGNOSIS — Z12.31 BREAST CANCER SCREENING BY MAMMOGRAM: ICD-10-CM

## 2024-09-12 PROCEDURE — 77063 BREAST TOMOSYNTHESIS BI: CPT

## 2024-09-12 PROCEDURE — 77067 SCR MAMMO BI INCL CAD: CPT

## 2024-10-21 ENCOUNTER — TELEPHONE (OUTPATIENT)
Dept: OBSTETRICS AND GYNECOLOGY | Age: 59
End: 2024-10-21
Payer: COMMERCIAL

## 2024-10-21 NOTE — TELEPHONE ENCOUNTER
Patient called stating that she and Dr. August decided to cancel her upcoming procedure. She got a reminder about her surgery and when she called to cancel it they wouldn't allow her to do so until someone from Our office called them to get that canceled.

## 2025-05-27 DIAGNOSIS — Z78.0 MENOPAUSE: ICD-10-CM

## 2025-05-27 DIAGNOSIS — F41.9 ANXIETY: ICD-10-CM

## 2025-05-27 RX ORDER — ESTRADIOL AND NORETHINDRONE ACETATE .5; .1 MG/1; MG/1
1 TABLET ORAL DAILY
Qty: 84 TABLET | Refills: 2 | Status: SHIPPED | OUTPATIENT
Start: 2025-05-27

## 2025-05-27 RX ORDER — SERTRALINE HYDROCHLORIDE 25 MG/1
25 TABLET, FILM COATED ORAL DAILY
Qty: 90 TABLET | Refills: 2 | Status: SHIPPED | OUTPATIENT
Start: 2025-05-27

## (undated) DEVICE — SNAR POLYP SENSATION MICRO OVL 13 240X40

## (undated) DEVICE — FRCP BX RADJAW4 NDL 2.8 240 STD OG

## (undated) DEVICE — THE CHANNEL CLEANING BRUSH IS A NYLON FLEXI BRUSH ATTACHED TO A FLEXIBLE PLASTIC SHEATH DESIGNED TO SAFELY REMOVE DEBRIS FROM FLEXIBLE ENDOSCOPES.

## (undated) DEVICE — THE SINGLE USE ETRAP – POLYP TRAP IS USED FOR SUCTION RETRIEVAL OF ENDOSCOPICALLY REMOVED POLYPS.: Brand: ETRAP

## (undated) DEVICE — MSK O2 MD CONCENTR A/ LF 7FT 1P/U

## (undated) DEVICE — Device: Brand: DEFENDO AIR/WATER/SUCTION AND BIOPSY VALVE

## (undated) DEVICE — CAP CONN RED

## (undated) DEVICE — CUFF,BP,DISP,1 TUBE,ADULT,HP: Brand: MEDLINE

## (undated) DEVICE — CYSTO/BLADDER IRRIGATION SET, REGULATING CLAMP

## (undated) DEVICE — PAD D&C: Brand: MEDLINE INDUSTRIES, INC.

## (undated) DEVICE — ENDOGATOR AUXILIARY WATER JET CONNECTOR: Brand: ENDOGATOR

## (undated) DEVICE — GLV SURG SENSICARE W/ALOE PF LF SZ6 STRL

## (undated) DEVICE — SENSR O2 OXIMAX FNGR A/ 18IN NONSTR

## (undated) DEVICE — TBG SMPL FLTR LINE NASL 02/C02 A/ BX/100

## (undated) DEVICE — PK TURNOVER RM ADV